# Patient Record
Sex: MALE | Race: WHITE | NOT HISPANIC OR LATINO | Employment: OTHER | URBAN - METROPOLITAN AREA
[De-identification: names, ages, dates, MRNs, and addresses within clinical notes are randomized per-mention and may not be internally consistent; named-entity substitution may affect disease eponyms.]

---

## 2023-03-31 LAB
ANION GAP IN SER/PLAS: 10 MMOL/L (ref 10–20)
CALCIUM (MG/DL) IN SER/PLAS: 9.5 MG/DL (ref 8.6–10.3)
CARBON DIOXIDE, TOTAL (MMOL/L) IN SER/PLAS: 29 MMOL/L (ref 21–32)
CHLORIDE (MMOL/L) IN SER/PLAS: 104 MMOL/L (ref 98–107)
CHOLESTEROL (MG/DL) IN SER/PLAS: 117 MG/DL (ref 0–199)
CHOLESTEROL IN HDL (MG/DL) IN SER/PLAS: 76.2 MG/DL
CHOLESTEROL/HDL RATIO: 1.5
CREATININE (MG/DL) IN SER/PLAS: 1.11 MG/DL (ref 0.5–1.3)
GFR MALE: 71 ML/MIN/1.73M2
GLUCOSE (MG/DL) IN SER/PLAS: 98 MG/DL (ref 74–99)
LDL: 27 MG/DL (ref 0–99)
POTASSIUM (MMOL/L) IN SER/PLAS: 4.3 MMOL/L (ref 3.5–5.3)
SODIUM (MMOL/L) IN SER/PLAS: 139 MMOL/L (ref 136–145)
TRIGLYCERIDE (MG/DL) IN SER/PLAS: 69 MG/DL (ref 0–149)
UREA NITROGEN (MG/DL) IN SER/PLAS: 14 MG/DL (ref 6–23)
VLDL: 14 MG/DL (ref 0–40)

## 2023-11-08 ENCOUNTER — LAB (OUTPATIENT)
Dept: LAB | Facility: LAB | Age: 73
End: 2023-11-08
Payer: MEDICARE

## 2023-11-08 DIAGNOSIS — E29.1 TESTICULAR HYPOFUNCTION: ICD-10-CM

## 2023-11-08 DIAGNOSIS — N40.1 BENIGN PROSTATIC HYPERPLASIA WITH LOWER URINARY TRACT SYMPTOMS: Primary | ICD-10-CM

## 2023-11-08 LAB
PSA SERPL-MCNC: 2.23 NG/ML
TESTOST SERPL-MCNC: 328 NG/DL (ref 240–1000)

## 2023-11-08 PROCEDURE — 36415 COLL VENOUS BLD VENIPUNCTURE: CPT

## 2023-11-08 PROCEDURE — 84153 ASSAY OF PSA TOTAL: CPT

## 2023-11-08 PROCEDURE — 84403 ASSAY OF TOTAL TESTOSTERONE: CPT

## 2023-12-21 ENCOUNTER — APPOINTMENT (OUTPATIENT)
Dept: CARDIOLOGY | Facility: CLINIC | Age: 73
End: 2023-12-21
Payer: MEDICARE

## 2024-01-03 PROBLEM — M79.10 MYALGIA: Status: ACTIVE | Noted: 2024-01-03

## 2024-01-03 PROBLEM — M79.645 PAIN OF LEFT THUMB: Status: ACTIVE | Noted: 2019-10-03

## 2024-01-03 PROBLEM — I49.3 PVC'S (PREMATURE VENTRICULAR CONTRACTIONS): Status: ACTIVE | Noted: 2024-01-03

## 2024-01-03 PROBLEM — M17.12 PRIMARY OSTEOARTHRITIS OF LEFT KNEE: Status: ACTIVE | Noted: 2022-11-10

## 2024-01-03 PROBLEM — N40.0 BENIGN PROSTATIC HYPERPLASIA: Status: ACTIVE | Noted: 2018-03-17

## 2024-01-03 PROBLEM — I47.29 NSVT (NONSUSTAINED VENTRICULAR TACHYCARDIA) (MULTI): Status: ACTIVE | Noted: 2024-01-03

## 2024-01-03 PROBLEM — M54.9 BACKACHE: Status: ACTIVE | Noted: 2024-01-03

## 2024-01-03 PROBLEM — R42 EPISODIC LIGHTHEADEDNESS: Status: ACTIVE | Noted: 2024-01-03

## 2024-01-03 PROBLEM — I25.10 CORONARY ARTERY DISEASE: Status: ACTIVE | Noted: 2024-01-03

## 2024-01-03 PROBLEM — Z20.822 SUSPECTED COVID-19 VIRUS INFECTION: Status: ACTIVE | Noted: 2024-01-03

## 2024-01-03 PROBLEM — E78.5 HYPERLIPIDEMIA: Status: ACTIVE | Noted: 2024-01-03

## 2024-01-03 PROBLEM — I45.9 HEART BLOCK: Status: ACTIVE | Noted: 2024-01-03

## 2024-01-03 PROBLEM — R55 NEAR SYNCOPE: Status: ACTIVE | Noted: 2024-01-03

## 2024-01-03 PROBLEM — F33.2 SEVERE EPISODE OF RECURRENT MAJOR DEPRESSIVE DISORDER, WITHOUT PSYCHOTIC FEATURES (MULTI): Status: ACTIVE | Noted: 2018-03-17

## 2024-01-03 PROBLEM — I10 HYPERTENSION, BENIGN: Status: ACTIVE | Noted: 2024-01-03

## 2024-01-03 PROBLEM — M18.12 OSTEOARTHRITIS OF CARPOMETACARPAL (CMC) JOINT OF LEFT THUMB: Status: ACTIVE | Noted: 2019-10-03

## 2024-01-03 PROBLEM — M25.649 THUMB JOINT STIFFNESS: Status: ACTIVE | Noted: 2019-10-03

## 2024-01-03 PROBLEM — K21.9 GASTROESOPHAGEAL REFLUX DISEASE WITHOUT ESOPHAGITIS: Status: ACTIVE | Noted: 2018-03-17

## 2024-01-03 PROBLEM — R00.1 BRADYCARDIA: Status: ACTIVE | Noted: 2024-01-03

## 2024-01-03 PROBLEM — M25.562 CHRONIC PAIN OF LEFT KNEE: Status: ACTIVE | Noted: 2022-11-10

## 2024-01-03 PROBLEM — I50.32 CHRONIC DIASTOLIC CONGESTIVE HEART FAILURE (MULTI): Status: ACTIVE | Noted: 2024-01-03

## 2024-01-03 PROBLEM — Z95.5 HISTORY OF CORONARY ARTERY STENT PLACEMENT: Status: ACTIVE | Noted: 2024-01-03

## 2024-01-03 PROBLEM — R94.39 ABNORMAL STRESS TEST: Status: ACTIVE | Noted: 2024-01-03

## 2024-01-03 PROBLEM — R53.83 FATIGUE: Status: ACTIVE | Noted: 2024-01-03

## 2024-01-03 PROBLEM — G89.29 CHRONIC PAIN OF LEFT KNEE: Status: ACTIVE | Noted: 2022-11-10

## 2024-01-03 PROBLEM — R01.1 HEART MURMUR: Status: ACTIVE | Noted: 2024-01-03

## 2024-01-03 PROBLEM — F32.A DEPRESSION: Status: ACTIVE | Noted: 2024-01-03

## 2024-01-03 PROBLEM — L05.01 PILONIDAL CYST WITH ABSCESS: Status: ACTIVE | Noted: 2024-01-03

## 2024-01-03 PROBLEM — I21.9 MYOCARDIAL INFARCT (MULTI): Status: ACTIVE | Noted: 2024-01-03

## 2024-01-03 PROBLEM — H91.90 HEARING DECREASED: Status: ACTIVE | Noted: 2024-01-03

## 2024-01-08 ENCOUNTER — APPOINTMENT (OUTPATIENT)
Dept: CARDIOLOGY | Facility: CLINIC | Age: 74
End: 2024-01-08
Payer: MEDICARE

## 2024-01-15 ENCOUNTER — OFFICE VISIT (OUTPATIENT)
Dept: CARDIOLOGY | Facility: CLINIC | Age: 74
End: 2024-01-15
Payer: MEDICARE

## 2024-01-15 VITALS
HEIGHT: 67 IN | SYSTOLIC BLOOD PRESSURE: 124 MMHG | DIASTOLIC BLOOD PRESSURE: 84 MMHG | WEIGHT: 209 LBS | BODY MASS INDEX: 32.8 KG/M2 | HEART RATE: 52 BPM | OXYGEN SATURATION: 95 %

## 2024-01-15 DIAGNOSIS — I47.29 NSVT (NONSUSTAINED VENTRICULAR TACHYCARDIA) (MULTI): ICD-10-CM

## 2024-01-15 DIAGNOSIS — I25.10 CORONARY ARTERY DISEASE INVOLVING NATIVE HEART, UNSPECIFIED VESSEL OR LESION TYPE, UNSPECIFIED WHETHER ANGINA PRESENT: Primary | ICD-10-CM

## 2024-01-15 DIAGNOSIS — G47.33 OBSTRUCTIVE SLEEP APNEA SYNDROME: ICD-10-CM

## 2024-01-15 DIAGNOSIS — I25.10 CORONARY ARTERY DISEASE INVOLVING NATIVE HEART, UNSPECIFIED VESSEL OR LESION TYPE, UNSPECIFIED WHETHER ANGINA PRESENT: ICD-10-CM

## 2024-01-15 DIAGNOSIS — E78.00 PURE HYPERCHOLESTEROLEMIA: ICD-10-CM

## 2024-01-15 DIAGNOSIS — I50.32 CHRONIC HEART FAILURE WITH PRESERVED EJECTION FRACTION (MULTI): ICD-10-CM

## 2024-01-15 DIAGNOSIS — Z95.5 HISTORY OF CORONARY ARTERY STENT PLACEMENT: ICD-10-CM

## 2024-01-15 DIAGNOSIS — I21.11 MYOCARDIAL INFARCTION INVOLVING RIGHT CORONARY ARTERY, UNSPECIFIED MI TYPE (MULTI): ICD-10-CM

## 2024-01-15 DIAGNOSIS — I10 HYPERTENSION, BENIGN: ICD-10-CM

## 2024-01-15 DIAGNOSIS — Z98.84 S/P GASTRIC BYPASS: ICD-10-CM

## 2024-01-15 PROCEDURE — 99214 OFFICE O/P EST MOD 30 MIN: CPT | Performed by: INTERNAL MEDICINE

## 2024-01-15 PROCEDURE — 3074F SYST BP LT 130 MM HG: CPT | Performed by: INTERNAL MEDICINE

## 2024-01-15 PROCEDURE — 1159F MED LIST DOCD IN RCRD: CPT | Performed by: INTERNAL MEDICINE

## 2024-01-15 PROCEDURE — 93000 ELECTROCARDIOGRAM COMPLETE: CPT | Performed by: INTERNAL MEDICINE

## 2024-01-15 PROCEDURE — 1036F TOBACCO NON-USER: CPT | Performed by: INTERNAL MEDICINE

## 2024-01-15 PROCEDURE — 3078F DIAST BP <80 MM HG: CPT | Performed by: INTERNAL MEDICINE

## 2024-01-15 PROCEDURE — 1126F AMNT PAIN NOTED NONE PRSNT: CPT | Performed by: INTERNAL MEDICINE

## 2024-01-15 RX ORDER — PANTOPRAZOLE SODIUM 40 MG/1
40 FOR SUSPENSION ORAL
COMMUNITY

## 2024-01-15 RX ORDER — ASPIRIN 81 MG/1
81 TABLET ORAL ONCE
Status: ON HOLD | COMMUNITY
Start: 2009-05-28 | End: 2024-04-16

## 2024-01-15 RX ORDER — TAMSULOSIN HYDROCHLORIDE 0.4 MG/1
0.4 CAPSULE ORAL DAILY
COMMUNITY
Start: 2014-05-20

## 2024-01-15 RX ORDER — FLUOXETINE HCL 20 MG
20 CAPSULE ORAL DAILY
COMMUNITY

## 2024-01-15 RX ORDER — PNV NO.95/FERROUS FUM/FOLIC AC 28MG-0.8MG
100 TABLET ORAL DAILY
COMMUNITY

## 2024-01-15 RX ORDER — BROMOCRIPTINE MESYLATE 5 MG/1
5 CAPSULE ORAL DAILY
COMMUNITY
Start: 2009-05-28

## 2024-01-15 RX ORDER — ATORVASTATIN CALCIUM 20 MG/1
20 TABLET, FILM COATED ORAL DAILY
COMMUNITY
End: 2024-01-15 | Stop reason: SDUPTHER

## 2024-01-15 RX ORDER — RANOLAZINE 500 MG/1
1 TABLET, EXTENDED RELEASE ORAL EVERY 12 HOURS
COMMUNITY
Start: 2020-11-19 | End: 2024-01-15 | Stop reason: SDUPTHER

## 2024-01-15 RX ORDER — MIRTAZAPINE 15 MG/1
15 TABLET, FILM COATED ORAL NIGHTLY
COMMUNITY
Start: 2018-03-19

## 2024-01-15 RX ORDER — CALCIUM ACETATE 667 MG/1
1 TABLET ORAL DAILY
COMMUNITY

## 2024-01-15 RX ORDER — MULTIVIT-MIN/IRON FUM/FOLIC AC 7.5 MG-4
1 TABLET ORAL DAILY
COMMUNITY
Start: 2015-09-16

## 2024-01-15 ASSESSMENT — PAIN SCALES - GENERAL: PAINLEVEL: 0-NO PAIN

## 2024-01-15 NOTE — PROGRESS NOTES
"  Subjective  Misael Lopez  is a 73 y.o. year old male who presents for ASHD F/U.  No chest pain, no dyspnea, no palpitgations, no edema.    Blood pressure 124/84, pulse 52, height 1.702 m (5' 7\"), weight 94.8 kg (209 lb), SpO2 95 %.   Nitroglycerin  Past Medical History:   Diagnosis Date    Other specified health status     No pertinent past medical history     Past Surgical History:   Procedure Laterality Date    BREAST LUMPECTOMY  09/17/2015    Breast Surgery Lumpectomy    BUNIONECTOMY  09/17/2015    Simple Bunion Exostectomy (Silver Procedure)    CARPAL TUNNEL RELEASE  09/17/2015    Neuroplasty Decompression Median Nerve At Carpal Tunnel    GASTRIC BYPASS  03/31/2016    Gastric Surgery For Morbid Obesity Gastric Bypass    LITHOTRIPSY  09/17/2015    Renal Lithotripsy    NASAL SEPTUM SURGERY  09/17/2015    Nasal Septal Deviation Repair    OTHER SURGICAL HISTORY  09/16/2015    Hemilaminectomy    OTHER SURGICAL HISTORY  11/16/2021    Cardiac catheterization    OTHER SURGICAL HISTORY  11/16/2021    Cardiac catheterization with stent placement    SHOULDER SURGERY  09/17/2015    Shoulder Surgery    VASECTOMY  09/16/2015    Surgery Vas Deferens Vasectomy     No family history on file.  @SOC    Current Outpatient Medications   Medication Sig Dispense Refill    aspirin 81 mg EC tablet Take 1 tablet (81 mg) by mouth 1 time.      bromocriptine (Parlodel) 5 mg capsule Take 1 capsule (5 mg) by mouth once daily.      mirtazapine (Remeron) 15 mg tablet Take 1 tablet (15 mg) by mouth once daily at bedtime.      multivitamin with minerals (multivit-min-iron fum-folic ac) tablet Take 1 tablet by mouth once daily.      ranolazine (Ranexa) 500 mg 12 hr tablet Take 1 tablet (500 mg) by mouth every 12 hours.      tamsulosin (Flomax) 0.4 mg 24 hr capsule Take 1 capsule (0.4 mg) by mouth once daily.      atorvastatin (Lipitor) 20 mg tablet Take 1 tablet (20 mg) by mouth once daily.      calcium acetate (Phoslo) 667 mg tablet Take 1 " tablet (667 mg) by mouth once daily.      calcium polycarbophiL (FiberCon) 625 mg tablet Take 1 tablet (625 mg) by mouth once daily.      cyanocobalamin (Vitamin B-12) 100 mcg tablet Take 1 tablet (100 mcg) by mouth once daily.      pantoprazole (Protonix) 40 mg packet Take 1 packet (40 mg) by mouth once daily in the morning. Take before meals.      PROzac 20 mg capsule Take 1 capsule (20 mg) by mouth once daily.       No current facility-administered medications for this visit.        ROS  Review of Systems   All other systems reviewed and are negative.      Physical Exam  Physical Exam  Constitutional:       Appearance: He is obese.   Eyes:      Extraocular Movements: Extraocular movements intact.      Pupils: Pupils are equal, round, and reactive to light.   Cardiovascular:      Rate and Rhythm: Normal rate and regular rhythm.      Heart sounds: S1 normal and S2 normal.   Pulmonary:      Effort: Pulmonary effort is normal.      Breath sounds: Normal breath sounds.   Abdominal:      Palpations: Abdomen is soft.   Musculoskeletal:      Right lower leg: No edema.      Left lower leg: No edema.   Skin:     General: Skin is warm and dry.   Neurological:      Mental Status: He is alert.          EKG  No results found for this or any previous visit (from the past 4464 hour(s)).    Problem List Items Addressed This Visit       Chronic heart failure with preserved ejection fraction (CMS/HCC)     2/28/22 Lexiscan LVEF = 50%         Relevant Medications    ranolazine (Ranexa) 500 mg 12 hr tablet    Coronary artery disease - Primary    Relevant Medications    ranolazine (Ranexa) 500 mg 12 hr tablet    Other Relevant Orders    ECG 12 lead (Clinic Performed)    Follow Up In Cardiology    History of coronary artery stent placement     3/16/20 PCI/RAKEL of right PDA [facility] ([place]),  11/2/20 cardiac catheterization  70% prox 2nd diag medical reatment.  Panexa 500 twice daily vbegun with improvement in symptoms.  2/28/22  Lexiscan normal ST response with  normal perfusion LVEF = 50%         Hyperlipidemia    Hypertension, benign    Myocardial infarct (CMS/HCC)     IWMI 2009 with acute RAKEL of RCA         Relevant Medications    ranolazine (Ranexa) 500 mg 12 hr tablet    NSVT (nonsustained ventricular tachycardia) (CMS/HCC)    Relevant Medications    ranolazine (Ranexa) 500 mg 12 hr tablet    Obstructive sleep apnea syndrome    S/P gastric bypass         Return 34 months with EKG      Davonte Valdez MD

## 2024-01-15 NOTE — ASSESSMENT & PLAN NOTE
3/16/20 PCI/RAKEL of right PDA [facility] ([place]),  11/2/20 cardiac catheterization  70% prox 2nd diag medical reatment.  Panexa 500 twice daily vbegun with improvement in symptoms.  2/28/22 Lexiscan normal ST response with  normal perfusion LVEF = 50%

## 2024-01-22 RX ORDER — ATORVASTATIN CALCIUM 20 MG/1
20 TABLET, FILM COATED ORAL DAILY
Qty: 90 TABLET | Refills: 3 | Status: SHIPPED | OUTPATIENT
Start: 2024-01-22 | End: 2024-05-13 | Stop reason: SDUPTHER

## 2024-01-22 RX ORDER — RANOLAZINE 500 MG/1
500 TABLET, EXTENDED RELEASE ORAL EVERY 12 HOURS
Qty: 90 TABLET | Refills: 3 | Status: SHIPPED | OUTPATIENT
Start: 2024-01-22 | End: 2024-05-13 | Stop reason: SDUPTHER

## 2024-01-23 ENCOUNTER — OFFICE VISIT (OUTPATIENT)
Dept: NEUROSURGERY | Facility: CLINIC | Age: 74
End: 2024-01-23
Payer: MEDICARE

## 2024-01-23 VITALS
DIASTOLIC BLOOD PRESSURE: 66 MMHG | BODY MASS INDEX: 32.02 KG/M2 | TEMPERATURE: 96.4 F | WEIGHT: 204 LBS | SYSTOLIC BLOOD PRESSURE: 115 MMHG | HEART RATE: 50 BPM | RESPIRATION RATE: 18 BRPM | HEIGHT: 67 IN

## 2024-01-23 DIAGNOSIS — R26.89 IMBALANCE: Primary | ICD-10-CM

## 2024-01-23 DIAGNOSIS — R20.2 PARESTHESIA OF UPPER EXTREMITY: ICD-10-CM

## 2024-01-23 DIAGNOSIS — M47.12 CERVICAL SPONDYLOSIS WITH MYELOPATHY: ICD-10-CM

## 2024-01-23 DIAGNOSIS — Z98.890 HISTORY OF EXCISION OF LAMINA OF LUMBAR VERTEBRA FOR DECOMPRESSION OF SPINAL CORD: ICD-10-CM

## 2024-01-23 PROCEDURE — 3074F SYST BP LT 130 MM HG: CPT | Performed by: NURSE PRACTITIONER

## 2024-01-23 PROCEDURE — 1036F TOBACCO NON-USER: CPT | Performed by: NURSE PRACTITIONER

## 2024-01-23 PROCEDURE — 99204 OFFICE O/P NEW MOD 45 MIN: CPT | Performed by: NURSE PRACTITIONER

## 2024-01-23 PROCEDURE — 1125F AMNT PAIN NOTED PAIN PRSNT: CPT | Performed by: NURSE PRACTITIONER

## 2024-01-23 PROCEDURE — 99214 OFFICE O/P EST MOD 30 MIN: CPT | Performed by: NURSE PRACTITIONER

## 2024-01-23 PROCEDURE — 1159F MED LIST DOCD IN RCRD: CPT | Performed by: NURSE PRACTITIONER

## 2024-01-23 PROCEDURE — 3078F DIAST BP <80 MM HG: CPT | Performed by: NURSE PRACTITIONER

## 2024-01-23 SDOH — ECONOMIC STABILITY: FOOD INSECURITY: WITHIN THE PAST 12 MONTHS, THE FOOD YOU BOUGHT JUST DIDN'T LAST AND YOU DIDN'T HAVE MONEY TO GET MORE.: NEVER TRUE

## 2024-01-23 SDOH — ECONOMIC STABILITY: FOOD INSECURITY: WITHIN THE PAST 12 MONTHS, YOU WORRIED THAT YOUR FOOD WOULD RUN OUT BEFORE YOU GOT MONEY TO BUY MORE.: NEVER TRUE

## 2024-01-23 ASSESSMENT — ENCOUNTER SYMPTOMS
LOSS OF SENSATION IN FEET: 0
DEPRESSION: 0
OCCASIONAL FEELINGS OF UNSTEADINESS: 1

## 2024-01-23 ASSESSMENT — COLUMBIA-SUICIDE SEVERITY RATING SCALE - C-SSRS
6. HAVE YOU EVER DONE ANYTHING, STARTED TO DO ANYTHING, OR PREPARED TO DO ANYTHING TO END YOUR LIFE?: NO
1. IN THE PAST MONTH, HAVE YOU WISHED YOU WERE DEAD OR WISHED YOU COULD GO TO SLEEP AND NOT WAKE UP?: NO
2. HAVE YOU ACTUALLY HAD ANY THOUGHTS OF KILLING YOURSELF?: NO

## 2024-01-23 ASSESSMENT — PATIENT HEALTH QUESTIONNAIRE - PHQ9
1. LITTLE INTEREST OR PLEASURE IN DOING THINGS: NOT AT ALL
2. FEELING DOWN, DEPRESSED OR HOPELESS: NOT AT ALL
SUM OF ALL RESPONSES TO PHQ9 QUESTIONS 1 & 2: 0

## 2024-01-23 ASSESSMENT — LIFESTYLE VARIABLES
HOW OFTEN DO YOU HAVE A DRINK CONTAINING ALCOHOL: MONTHLY OR LESS
HOW OFTEN DO YOU HAVE SIX OR MORE DRINKS ON ONE OCCASION: NEVER
SKIP TO QUESTIONS 9-10: 1
HOW MANY STANDARD DRINKS CONTAINING ALCOHOL DO YOU HAVE ON A TYPICAL DAY: 1 OR 2
AUDIT-C TOTAL SCORE: 1

## 2024-01-23 ASSESSMENT — PAIN SCALES - GENERAL: PAINLEVEL: 6

## 2024-01-23 NOTE — PROGRESS NOTES
"It was a pleasure to see Misael Lopez on 1/23/2024. He is a 73 y.o. year old, right-handed male who presents, with his wife Beronica,, to the [facility] for evaluation of recurrent back pain after remote lumbar surgery. Patient is referred by No ref. provider found. PMH is significant for HTN / HPL, heart block, CHF, NSVT, h/o MI, CAD (s/p stent), prior gastric bypass, GERD, renal stones, depression, JOES, BPH. He is a retired ; lives at home with wife.     He had lumbar laminectomy surgery in 01/2009, then picked up heavy item in 05/2009. Repeat MRI done, with conservative management thereafter.    Misael Lopez has had symptoms of back pain (points to right lumbosacral region, into posterior right thigh) since 08/2023, without inciting event. Symptoms are worse with sitting, walking; better with laying flat, standing, forward flexion of spine. Progression of symptoms prompted today's visit.  Thus far, patient has tried activity adjustment with little to no improvement of symptoms. He denies change in bowel / bladder function, saddle anesthesia.     He also notes STUMBLING SINCE MAY 2023., DIFFICULTY DRESSING, HAND WEAKNESS    PREVIOUS TREATMENTS  Activity adjustment  NSAIDs - contraindicated (s/p gastric bypass)  Topical    Previous Spine Surgery:  L3 - 5 hemilami in 2009 by Dr. Tea Leigh  at [facility]      Smoker: No   Anticoagulation / Antiplatelets: YES: Daily ASA     ROS: 12 / 12 systems reviewed and are negative unless noted in HPI    /66   Pulse 50   Temp 35.8 °C (96.4 °F)   Resp 18   Ht 1.702 m (5' 7\")   Wt 92.5 kg (204 lb)   BMI 31.95 kg/m²     ON EXAM:  General: Well developed, awake/alert/oriented x 3, no distress, alert and cooperative  Skin: Warm and dry, no visible lesions / rashes  ENMT: Mucous membranes moist, no apparent injury  Head/Neck: No apparent injury, LIMITATION WITH LATERAL FLEXION / ROTATION, EXTENSION  Respiratory/Thorax: Normal " breathing with good chest expansion, thorax symmetric  Cardiovascular: No pitting edema, no JVD  Gastrointestinal: Non-distended  NEUROLOGICAL EXAM:  EOMI, face symmetric  Motor Strength: 5/5 BUE, BLE  Muscle Tone: Normal without spasticity or contractures in all extremities  Muscle Bulk: Normal and symmetric in all extremities  Posture:  -- Cervical: Normal  -- Thoracic: Normal  -- Lumbar : Normal  Paraspinal muscle spasm/tenderness : Mild RIGHT SI Joint tenderness.  No palpable tenderness along the spinous processes.  Sensation: DECREASED SENSORY IN RIGHT L5, S1 DERMATOMES, RIGHT C5 DERMATOME; otherwise, SILT in BUE, BLE  Gait: Normal, Toe / Heel Gait are intact, TANDEM GAIT UNSTEADY  Deep Tendon Reflexes: 2+ BUE, 1+ BLE,  +MEGAN'S SIGN on RIGHT    Misael Lopez has imbalance, difficulty dressing, and + Megan's sign on exam today. Concern for CERVICAL MYELOPATHY WITH MEGAN's.  IMBALANCE, DROPPING ITEMS. We discussed rationale for dynamic cervical and lumbar imaging and for MRI C Spine to evaluate for cervical cord compression to explain myelopathic symptoms, exam findings as above. He agrees to follow up after completion of MRI to review results for treatment direction (surgical vs non-surgical). He wishes to consider Dr. Davonte Mauricio should surgical intervention be indicated.  Alysia Fuentes, APRN-CNP

## 2024-01-25 ENCOUNTER — HOSPITAL ENCOUNTER (OUTPATIENT)
Dept: RADIOLOGY | Facility: HOSPITAL | Age: 74
Discharge: HOME | End: 2024-01-25
Payer: MEDICARE

## 2024-01-25 DIAGNOSIS — Z98.890 HISTORY OF EXCISION OF LAMINA OF LUMBAR VERTEBRA FOR DECOMPRESSION OF SPINAL CORD: ICD-10-CM

## 2024-01-25 DIAGNOSIS — M47.12 CERVICAL SPONDYLOSIS WITH MYELOPATHY: ICD-10-CM

## 2024-01-25 DIAGNOSIS — R26.89 IMBALANCE: ICD-10-CM

## 2024-01-25 DIAGNOSIS — R20.2 PARESTHESIA OF UPPER EXTREMITY: ICD-10-CM

## 2024-01-25 PROCEDURE — 72052 X-RAY EXAM NECK SPINE 6/>VWS: CPT

## 2024-01-25 PROCEDURE — 72052 X-RAY EXAM NECK SPINE 6/>VWS: CPT | Performed by: RADIOLOGY

## 2024-01-25 PROCEDURE — 72114 X-RAY EXAM L-S SPINE BENDING: CPT

## 2024-01-25 PROCEDURE — 72114 X-RAY EXAM L-S SPINE BENDING: CPT | Performed by: RADIOLOGY

## 2024-02-06 ENCOUNTER — HOSPITAL ENCOUNTER (OUTPATIENT)
Dept: RADIOLOGY | Facility: CLINIC | Age: 74
Discharge: HOME | End: 2024-02-06
Payer: MEDICARE

## 2024-02-06 DIAGNOSIS — M47.12 CERVICAL SPONDYLOSIS WITH MYELOPATHY: ICD-10-CM

## 2024-02-06 DIAGNOSIS — R26.89 IMBALANCE: ICD-10-CM

## 2024-02-06 DIAGNOSIS — R20.2 PARESTHESIA OF UPPER EXTREMITY: ICD-10-CM

## 2024-02-06 PROCEDURE — 72141 MRI NECK SPINE W/O DYE: CPT | Performed by: RADIOLOGY

## 2024-02-06 PROCEDURE — 72141 MRI NECK SPINE W/O DYE: CPT

## 2024-02-09 ENCOUNTER — OFFICE VISIT (OUTPATIENT)
Dept: NEUROSURGERY | Facility: CLINIC | Age: 74
End: 2024-02-09
Payer: MEDICARE

## 2024-02-09 VITALS
TEMPERATURE: 97.5 F | BODY MASS INDEX: 32.18 KG/M2 | SYSTOLIC BLOOD PRESSURE: 130 MMHG | WEIGHT: 205 LBS | HEIGHT: 67 IN | DIASTOLIC BLOOD PRESSURE: 80 MMHG | HEART RATE: 63 BPM

## 2024-02-09 DIAGNOSIS — M47.12 CERVICAL SPONDYLOSIS WITH MYELOPATHY: ICD-10-CM

## 2024-02-09 DIAGNOSIS — R26.89 IMBALANCE: Primary | ICD-10-CM

## 2024-02-09 PROCEDURE — 3079F DIAST BP 80-89 MM HG: CPT | Performed by: NURSE PRACTITIONER

## 2024-02-09 PROCEDURE — 1125F AMNT PAIN NOTED PAIN PRSNT: CPT | Performed by: NURSE PRACTITIONER

## 2024-02-09 PROCEDURE — 99213 OFFICE O/P EST LOW 20 MIN: CPT | Performed by: NURSE PRACTITIONER

## 2024-02-09 PROCEDURE — 3075F SYST BP GE 130 - 139MM HG: CPT | Performed by: NURSE PRACTITIONER

## 2024-02-09 PROCEDURE — 1160F RVW MEDS BY RX/DR IN RCRD: CPT | Performed by: NURSE PRACTITIONER

## 2024-02-09 PROCEDURE — 1036F TOBACCO NON-USER: CPT | Performed by: NURSE PRACTITIONER

## 2024-02-09 PROCEDURE — 1159F MED LIST DOCD IN RCRD: CPT | Performed by: NURSE PRACTITIONER

## 2024-02-09 ASSESSMENT — PAIN SCALES - GENERAL: PAINLEVEL: 9

## 2024-02-09 ASSESSMENT — PATIENT HEALTH QUESTIONNAIRE - PHQ9
2. FEELING DOWN, DEPRESSED OR HOPELESS: NOT AT ALL
SUM OF ALL RESPONSES TO PHQ9 QUESTIONS 1 & 2: 0
1. LITTLE INTEREST OR PLEASURE IN DOING THINGS: NOT AT ALL

## 2024-02-09 NOTE — PROGRESS NOTES
"Misael Lopez is here today in follow up for cervical myelopathy and to review images. To review, he was initially evaluated on 01/23/2024. He reported lumbosacral pain since 08/2023. He noted stumbling, difficulty dressing, hand weakness since 05/2023. On exam, he had significant cervical spine ROM, sensory deficit in right L5, S1 and C5 dermatomes, and right Megan's sign present. Orders were written for MRI C Spine given symptoms and exam findings of cervical myelopathy..    Today, he reports no change in symptoms. He had imaging completed and is here to review findings.    MRI C SPINE on 02/06/2024:  IMPRESSION:  Severe spinal canal stenosis at C4-C5 and C5-C6. At C5-C6 there is  focal cord signal abnormality suggestive of myelomalacia although a  component of cord edema is not completely excluded. There is also  advanced neural foraminal narrowing at these levels. Additional  multilevel degenerative change as above.  Signed by: Ashish Davis 2/6/2024    XR C SPINE on 01/26/2024:  IMPRESSION:  Multilevel cervical degenerative changes with multilevel neural  foraminal narrowing.  Signed by: Presley Levine     XR LS SPINE on 01/26/2024:  IMPRESSION:  Fairly advanced lumbar degenerative changes particularly L3-S1.  Signed by: Presley Levine    PREVIOUS TREATMENTS  Activity adjustment  NSAIDs - contraindicated (s/p gastric bypass)  Topical     Previous Spine Surgery:  L3 - 5 hemilami in 2009 by Dr. Tea Leigh  at [facility]       Smoker: No   Anticoagulation / Antiplatelets: YES: Daily ASA     ROS x 10 is, otherwise, negative unless documented in HPI above    /80 (BP Location: Left arm, Patient Position: Sitting, BP Cuff Size: Adult)   Pulse 63   Temp 36.4 °C (97.5 °F) (Temporal)   Ht 1.702 m (5' 7\")   Wt 93 kg (205 lb)   BMI 32.11 kg/m²     On Exam: Appears comfortable  A&O x 4, speech clear / fluent  Respirations even / unlabored  Abdomen without distension  MONTES DE OCA  Gait    We reviewed MRI C Spine images with " pointing out of cervical cord impingement. We discussed follow up with neurosurgeon, Dr. Davonte Mauricio, as scheduled, on 02/13/2024. He agrees to have CT C Spine completed. MRI L Spine requested to evaluate lumbar symptoms / degenerative findings noted on x-ray lumbar spine.   Alysia Fuentes, APRN-CNP

## 2024-02-13 ENCOUNTER — OFFICE VISIT (OUTPATIENT)
Dept: NEUROSURGERY | Facility: CLINIC | Age: 74
End: 2024-02-13
Payer: MEDICARE

## 2024-02-13 VITALS
SYSTOLIC BLOOD PRESSURE: 116 MMHG | RESPIRATION RATE: 20 BRPM | TEMPERATURE: 97 F | HEART RATE: 65 BPM | BODY MASS INDEX: 33.59 KG/M2 | DIASTOLIC BLOOD PRESSURE: 70 MMHG | WEIGHT: 214 LBS | HEIGHT: 67 IN

## 2024-02-13 DIAGNOSIS — M51.37 DEGENERATION OF LUMBAR OR LUMBOSACRAL INTERVERTEBRAL DISC: ICD-10-CM

## 2024-02-13 DIAGNOSIS — M47.27 OSTEOARTHRITIS OF SPINE WITH RADICULOPATHY, LUMBOSACRAL REGION: ICD-10-CM

## 2024-02-13 DIAGNOSIS — M47.12 CERVICAL SPONDYLOSIS WITH MYELOPATHY: Primary | ICD-10-CM

## 2024-02-13 DIAGNOSIS — M47.14 SPONDYLOSIS WITH MYELOPATHY, THORACIC REGION: ICD-10-CM

## 2024-02-13 PROCEDURE — 3074F SYST BP LT 130 MM HG: CPT | Performed by: NEUROLOGICAL SURGERY

## 2024-02-13 PROCEDURE — 99215 OFFICE O/P EST HI 40 MIN: CPT | Performed by: NEUROLOGICAL SURGERY

## 2024-02-13 PROCEDURE — 1125F AMNT PAIN NOTED PAIN PRSNT: CPT | Performed by: NEUROLOGICAL SURGERY

## 2024-02-13 PROCEDURE — 1159F MED LIST DOCD IN RCRD: CPT | Performed by: NEUROLOGICAL SURGERY

## 2024-02-13 PROCEDURE — 3078F DIAST BP <80 MM HG: CPT | Performed by: NEUROLOGICAL SURGERY

## 2024-02-13 PROCEDURE — 1160F RVW MEDS BY RX/DR IN RCRD: CPT | Performed by: NEUROLOGICAL SURGERY

## 2024-02-13 PROCEDURE — 1036F TOBACCO NON-USER: CPT | Performed by: NEUROLOGICAL SURGERY

## 2024-02-13 ASSESSMENT — PATIENT HEALTH QUESTIONNAIRE - PHQ9
1. LITTLE INTEREST OR PLEASURE IN DOING THINGS: NOT AT ALL
2. FEELING DOWN, DEPRESSED OR HOPELESS: NOT AT ALL
SUM OF ALL RESPONSES TO PHQ9 QUESTIONS 1 AND 2: 0

## 2024-02-13 ASSESSMENT — PAIN SCALES - GENERAL: PAINLEVEL: 7

## 2024-02-13 NOTE — PROGRESS NOTES
[facility] [place]  Department of Neurological Surgery  Established Patient Visit    History of Present Illness  Misael Lopez is a 73 y.o. year old male who presents to the spine clinic in follow up with his wife complaining of mostly right buttock and thigh pain but was recently evaluated by my nurse practitioner and found to have numerous signs of myelopathy.  He does admit to having had years of neck pain and some arm symptoms including loss of fine motor touch in his fingertips.  He has had carpal tunnel surgery.  He has had rotator surgery on the left.  But no one ever evaluated his cervical spine until recently.  He does have trouble with his balance and he has lost many fine motor coordination activities with his hands.    Patient's BMI is Body mass index is 33.52 kg/m².    14/14 systems reviewed and negative other than what is listed in the history of present illness    Patient Active Problem List   Diagnosis    Abnormal stress test    Backache    Benign prostatic hyperplasia    Bradycardia    Calculus of kidney    Chronic heart failure with preserved ejection fraction (CMS/HCC)    Chronic pain of left knee    Coronary artery disease    Depression    Episodic lightheadedness    Fatigue    Gastroesophageal reflux disease without esophagitis    Hearing decreased    Heart block    Heart murmur    History of coronary artery stent placement    Hyperlipidemia    Hypertension, benign    Degeneration of lumbar or lumbosacral intervertebral disc    Lumbosacral spondylosis    Obesity, unspecified    Myalgia    Angina pectoris (CMS/HCC)    Myocardial infarct (CMS/HCC)    Near syncope    NSVT (nonsustained ventricular tachycardia) (CMS/HCC)    Obstructive sleep apnea syndrome    Osteoarthritis of carpometacarpal (CMC) joint of left thumb    Pain of left thumb    Pilonidal cyst with abscess    Primary osteoarthritis of left knee    PVC's (premature ventricular contractions)    Severe episode of  recurrent major depressive disorder, without psychotic features (CMS/HCC)    Suspected COVID-19 virus infection    Thumb joint stiffness    S/P gastric bypass     Past Medical History:   Diagnosis Date    Other specified health status     No pertinent past medical history     Past Surgical History:   Procedure Laterality Date    BREAST LUMPECTOMY  09/17/2015    Breast Surgery Lumpectomy    BUNIONECTOMY  09/17/2015    Simple Bunion Exostectomy (Silver Procedure)    CARPAL TUNNEL RELEASE  09/17/2015    Neuroplasty Decompression Median Nerve At Carpal Tunnel    GASTRIC BYPASS  03/31/2016    Gastric Surgery For Morbid Obesity Gastric Bypass    LITHOTRIPSY  09/17/2015    Renal Lithotripsy    NASAL SEPTUM SURGERY  09/17/2015    Nasal Septal Deviation Repair    OTHER SURGICAL HISTORY  09/16/2015    Hemilaminectomy    OTHER SURGICAL HISTORY  11/16/2021    Cardiac catheterization    OTHER SURGICAL HISTORY  11/16/2021    Cardiac catheterization with stent placement    SHOULDER SURGERY  09/17/2015    Shoulder Surgery    VASECTOMY  09/16/2015    Surgery Vas Deferens Vasectomy     Social History     Tobacco Use    Smoking status: Never    Smokeless tobacco: Never   Substance Use Topics    Alcohol use: Yes     Comment: socially     family history is not on file.    Current Outpatient Medications:     aspirin 81 mg EC tablet, Take 1 tablet (81 mg) by mouth 1 time., Disp: , Rfl:     atorvastatin (Lipitor) 20 mg tablet, Take 1 tablet (20 mg) by mouth once daily., Disp: 90 tablet, Rfl: 3    bromocriptine (Parlodel) 5 mg capsule, Take 1 capsule (5 mg) by mouth once daily., Disp: , Rfl:     calcium acetate (Phoslo) 667 mg tablet, Take 1 tablet (667 mg) by mouth once daily., Disp: , Rfl:     calcium polycarbophiL (FiberCon) 625 mg tablet, Take 1 tablet (625 mg) by mouth once daily., Disp: , Rfl:     cyanocobalamin (Vitamin B-12) 100 mcg tablet, Take 1 tablet (100 mcg) by mouth once daily., Disp: , Rfl:     mirtazapine (Remeron) 15 mg  tablet, Take 1 tablet (15 mg) by mouth once daily at bedtime., Disp: , Rfl:     multivitamin with minerals (multivit-min-iron fum-folic ac) tablet, Take 1 tablet by mouth once daily., Disp: , Rfl:     pantoprazole (Protonix) 40 mg packet, Take 1 packet (40 mg) by mouth once daily in the morning. Take before meals., Disp: , Rfl:     PROzac 20 mg capsule, Take 1 capsule (20 mg) by mouth once daily., Disp: , Rfl:     ranolazine (Ranexa) 500 mg 12 hr tablet, Take 1 tablet (500 mg) by mouth every 12 hours., Disp: 90 tablet, Rfl: 3    tamsulosin (Flomax) 0.4 mg 24 hr capsule, Take 1 capsule (0.4 mg) by mouth once daily., Disp: , Rfl:   Allergies   Allergen Reactions    Nitroglycerin Other     Arrhythmia / Heart Block       Physical Examination:    General: NAD, AOx 3,  no aphasia or dysarthria, normal fund of knowledge  Cranial Nerves II-XII: VFF, PERRL, EOMI, Face Symm, Facial SILT, Palate/Tongue midline and symmetric, he does wear hearing aids  Motor: 5/5 Throughout all extremities except in the lower extremity on the right where everything was about 4.5 out of 5,  No drift, no dysmetria on finger to nose  Sensation: SILT and PP throughout all extremities  DTRS: 1+ Throughout, positive Hoffmans, no clonus  His gait was a little wide-based and unsteady and he had difficulty going up on his heels and his toes and he has a sway on checking for his Romberg    Results:  I personally reviewed and interpreted the imaging results which included the MRI of his cervical spine which shows that he has severe cord compression at C4-5, C5-6, and C6-7 and he has a central bulge at C3-4 with some subluxation on the MRI but that reduces with extension on his flexion-extension x-rays.    Assessment and Plan:      Misael Lopez is a 73 y.o. year old male who presents to the spine clinic in follow up with cervical myelopathy and evidence of myelomalacia on his MRI.  I know that he is going to require at least a 3 level anterior cervical  discectomy with interbody fusion and plate fixation and possibly a 4 level operation and if he has OPLL a corpectomy at 1 level or 2 might be warranted.  I gave the patient and his wife a primer on the operation of a 4 level anterior cervical discectomy with interbody fusion and plate fixation.  I went over the operation with him in detail. We discussed risks,(these include but are not limited to - bleeding, infection, paralysis, muscle weakness, CSF leak, bowel or bladder dysfunction, incomplete resolution of pain or numbness, DVT/PE, heart attack, stroke, and other unforeseen medical and anesthesia complications) benefits, and outcome possibilities as well as the alternatives to this form of therapy. He understands and would like to proceed.  Before we schedule him for surgery there are several things that I want to do.  I want a CAT scan of his cervical spine; I want an MRI of his thoracic spine because I can see significant degenerative changes in the upper thoracic spine but want to make certain I protect all areas of his spine.  Since his primary complaints are coming from the lumbar region and has had prior surgery there and also ordering an MRI of his lumbar spine.  In the meantime we will have my office asked his cardiologist for clearance.  We will bring him back in after the studies to have a final discussion of surgical intervention.      I have reviewed all prior documentation and reviewed the electronic medical record since admission. I have personally have reviewed all advanced imaging not just the reports and used my interpretation as documented as the relevant findings. I have reviewed the risks and benefits of all treatment recommendations listed in this note with the patient and family. I spent a total of 50 minutes in service to this patient's care during this date of service.      The above clinical summary has been dictated with voice recognition software. It has not been proofread for  grammatical errors, typographical mistakes, or other semantic inconsistencies.    Thank you for visiting our office today. It was our pleasure to take part in your healthcare.     Do not hesitate to call with any questions regarding your plan of care after leaving. My office can be reached at [phone] M-F 8am-4pm.     To clinicians, thank you very much for this kind referral. It is a privilege to partner with you in the care of your patients. My office would be delighted to assist you with any further consultations or with questions regarding the plan of care outlined. Do not hesitate to call the office or contact me directly.     Sincerely,    Davonte Mauricio MD, FAANS, FACS  Board Certified Neurological Surgeon  , Department of Neurological Surgery  [facility] [employer]    [facility]  [address]  [facility] [address]  [place] [address]    [facility]  [address]  [address]  [place] [address]    Phone: [phone]  Fax: [phone]

## 2024-02-18 ENCOUNTER — HOSPITAL ENCOUNTER (OUTPATIENT)
Dept: RADIOLOGY | Facility: HOSPITAL | Age: 74
Discharge: HOME | End: 2024-02-18
Payer: MEDICARE

## 2024-02-18 DIAGNOSIS — M47.12 CERVICAL SPONDYLOSIS WITH MYELOPATHY: ICD-10-CM

## 2024-02-18 DIAGNOSIS — R26.89 IMBALANCE: ICD-10-CM

## 2024-02-18 PROCEDURE — 72125 CT NECK SPINE W/O DYE: CPT | Performed by: RADIOLOGY

## 2024-02-18 PROCEDURE — 72125 CT NECK SPINE W/O DYE: CPT

## 2024-02-19 ENCOUNTER — HOSPITAL ENCOUNTER (OUTPATIENT)
Dept: RADIOLOGY | Facility: CLINIC | Age: 74
Discharge: HOME | End: 2024-02-19
Payer: MEDICARE

## 2024-02-19 DIAGNOSIS — R26.89 IMBALANCE: ICD-10-CM

## 2024-02-19 PROCEDURE — 72148 MRI LUMBAR SPINE W/O DYE: CPT

## 2024-02-19 PROCEDURE — 72146 MRI CHEST SPINE W/O DYE: CPT | Performed by: RADIOLOGY

## 2024-02-19 PROCEDURE — 72146 MRI CHEST SPINE W/O DYE: CPT

## 2024-02-19 PROCEDURE — 72148 MRI LUMBAR SPINE W/O DYE: CPT | Performed by: RADIOLOGY

## 2024-03-05 ENCOUNTER — OFFICE VISIT (OUTPATIENT)
Dept: NEUROSURGERY | Facility: CLINIC | Age: 74
End: 2024-03-05
Payer: MEDICARE

## 2024-03-05 VITALS
HEART RATE: 82 BPM | DIASTOLIC BLOOD PRESSURE: 72 MMHG | SYSTOLIC BLOOD PRESSURE: 110 MMHG | HEIGHT: 67 IN | WEIGHT: 210.8 LBS | RESPIRATION RATE: 20 BRPM | TEMPERATURE: 97.1 F | BODY MASS INDEX: 33.09 KG/M2

## 2024-03-05 DIAGNOSIS — M48.062 LUMBAR STENOSIS WITH NEUROGENIC CLAUDICATION: ICD-10-CM

## 2024-03-05 DIAGNOSIS — M51.04 HERNIATION OF INTERVERTEBRAL DISC OF THORACIC SPINE WITH MYELOPATHY: ICD-10-CM

## 2024-03-05 DIAGNOSIS — G95.89 MYELOMALACIA OF CERVICAL CORD (MULTI): ICD-10-CM

## 2024-03-05 DIAGNOSIS — M47.12 CERVICAL SPONDYLOSIS WITH MYELOPATHY: Primary | ICD-10-CM

## 2024-03-05 PROCEDURE — 99214 OFFICE O/P EST MOD 30 MIN: CPT | Performed by: NEUROLOGICAL SURGERY

## 2024-03-05 PROCEDURE — 1159F MED LIST DOCD IN RCRD: CPT | Performed by: NEUROLOGICAL SURGERY

## 2024-03-05 PROCEDURE — 1036F TOBACCO NON-USER: CPT | Performed by: NEUROLOGICAL SURGERY

## 2024-03-05 PROCEDURE — 3078F DIAST BP <80 MM HG: CPT | Performed by: NEUROLOGICAL SURGERY

## 2024-03-05 PROCEDURE — 1160F RVW MEDS BY RX/DR IN RCRD: CPT | Performed by: NEUROLOGICAL SURGERY

## 2024-03-05 PROCEDURE — 1125F AMNT PAIN NOTED PAIN PRSNT: CPT | Performed by: NEUROLOGICAL SURGERY

## 2024-03-05 PROCEDURE — 3074F SYST BP LT 130 MM HG: CPT | Performed by: NEUROLOGICAL SURGERY

## 2024-03-05 ASSESSMENT — PAIN SCALES - GENERAL: PAINLEVEL: 5

## 2024-03-05 NOTE — PROGRESS NOTES
[facility] [place]  Department of Neurological Surgery  Established Patient Visit    History of Present Illness  Misael Lopez is a 73 y.o. year old male who presents to the spine clinic in follow up with his wife and the new CAT scan of his cervical spine.  The spurs at his cervical levels are more clearly defined but he does not have ossification of the posterior longitudinal ligament behind the vertebral bodies and so he will not need a corpectomy at any of the levels I am planning to operate on.  During his last visit we had gone over the MRI together.  He is clearly showing signs of myelopathy both from his thoracic abnormality but more importantly from his cervical cord compression and myelomalacia.  He has severe cord compression at 3 of the 4 levels C4-5, C5-6, and C6-7 and he has subluxation of C3 on C4 with severe foraminal stenosis.  Because this is affecting his life at this point and he is not able to do the activities that he enjoys and he feels that he is losing his function.  He is losing his balance, he is stumbling, and he is not able to feel objects and having difficulty with buttoning buttons snapping snaps etc.  I believe that we should proceed immediately with surgical options.  This is not a situation where physical therapy is indicated because of the severe compression and the myelomalacia that is already developed.  I am recommending an anterior cervical disc excision with interbody fusion using allograft and locally harvested autograft with plate and screw fixation at C3-4, C4-5, C5-6, and C6-7.  I went over the operation with him in detail. We discussed risks,(these include but are not limited to - bleeding, infection, paralysis, muscle weakness, CSF leak, bowel or bladder dysfunction, incomplete resolution of pain or numbness, DVT/PE, heart attack, stroke, and other unforeseen medical and anesthesia complications) benefits, and outcome possibilities as well as the  alternatives to this form of therapy. He understands and would like to proceed.    He also understands that we will allow him to heal up from 1 operation and then we will need to proceed with the thoracic and or the lumbar region next, but regardless of which order we do them in he is going to need them done before he gets good relief of his pain and an ability to walk for any distance back.    Patient's BMI is Body mass index is 33.02 kg/m².    14/14 systems reviewed and negative other than what is listed in the history of present illness    Patient Active Problem List   Diagnosis    Abnormal stress test    Backache    Benign prostatic hyperplasia    Bradycardia    Calculus of kidney    Chronic heart failure with preserved ejection fraction (CMS/HCC)    Chronic pain of left knee    Coronary artery disease    Depression    Episodic lightheadedness    Fatigue    Gastroesophageal reflux disease without esophagitis    Hearing decreased    Heart block    Heart murmur    History of coronary artery stent placement    Hyperlipidemia    Hypertension, benign    Degeneration of lumbar or lumbosacral intervertebral disc    Lumbosacral spondylosis    Obesity, unspecified    Myalgia    Angina pectoris (CMS/HCC)    Myocardial infarct (CMS/HCC)    Near syncope    NSVT (nonsustained ventricular tachycardia) (CMS/HCC)    Obstructive sleep apnea syndrome    Osteoarthritis of carpometacarpal (CMC) joint of left thumb    Pain of left thumb    Pilonidal cyst with abscess    Primary osteoarthritis of left knee    PVC's (premature ventricular contractions)    Severe episode of recurrent major depressive disorder, without psychotic features (CMS/HCC)    Suspected COVID-19 virus infection    Thumb joint stiffness    S/P gastric bypass    Myelomalacia of cervical cord (CMS/HCC)    Cervical spondylosis with myelopathy    Lumbar stenosis with neurogenic claudication    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      Past Medical History:   Diagnosis Date    Other specified health status     No pertinent past medical history     Past Surgical History:   Procedure Laterality Date    BREAST LUMPECTOMY  09/17/2015    Breast Surgery Lumpectomy    BUNIONECTOMY  09/17/2015    Simple Bunion Exostectomy (Silver Procedure)    CARPAL TUNNEL RELEASE  09/17/2015    Neuroplasty Decompression Median Nerve At Carpal Tunnel    GASTRIC BYPASS  03/31/2016    Gastric Surgery For Morbid Obesity Gastric Bypass    LITHOTRIPSY  09/17/2015    Renal Lithotripsy    NASAL SEPTUM SURGERY  09/17/2015    Nasal Septal Deviation Repair    OTHER SURGICAL HISTORY  09/16/2015    Hemilaminectomy    OTHER SURGICAL HISTORY  11/16/2021    Cardiac catheterization    OTHER SURGICAL HISTORY  11/16/2021    Cardiac catheterization with stent placement    SHOULDER SURGERY  09/17/2015    Shoulder Surgery    VASECTOMY  09/16/2015    Surgery Vas Deferens Vasectomy     Social History     Tobacco Use    Smoking status: Never    Smokeless tobacco: Never   Substance Use Topics    Alcohol use: Yes     Comment: socially     family history is not on file.    Current Outpatient Medications:     aspirin 81 mg EC tablet, Take 1 tablet (81 mg) by mouth 1 time., Disp: , Rfl:     atorvastatin (Lipitor) 20 mg tablet, Take 1 tablet (20 mg) by mouth once daily., Disp: 90 tablet, Rfl: 3    bromocriptine (Parlodel) 5 mg capsule, Take 1 capsule (5 mg) by mouth once daily., Disp: , Rfl:     calcium acetate (Phoslo) 667 mg tablet, Take 1 tablet (667 mg) by mouth once daily., Disp: , Rfl:     calcium polycarbophiL (FiberCon) 625 mg tablet, Take 1 tablet (625 mg) by mouth once daily., Disp: , Rfl:     cyanocobalamin (Vitamin B-12) 100 mcg tablet, Take 1 tablet (100 mcg) by mouth once daily., Disp: , Rfl:     mirtazapine (Remeron) 15 mg tablet, Take 1 tablet (15 mg) by mouth once daily at bedtime., Disp: , Rfl:     multivitamin with minerals (multivit-min-iron fum-folic ac) tablet, Take 1 tablet  by mouth once daily., Disp: , Rfl:     pantoprazole (Protonix) 40 mg packet, Take 1 packet (40 mg) by mouth once daily in the morning. Take before meals., Disp: , Rfl:     PROzac 20 mg capsule, Take 1 capsule (20 mg) by mouth once daily., Disp: , Rfl:     ranolazine (Ranexa) 500 mg 12 hr tablet, Take 1 tablet (500 mg) by mouth every 12 hours., Disp: 90 tablet, Rfl: 3    tamsulosin (Flomax) 0.4 mg 24 hr capsule, Take 1 capsule (0.4 mg) by mouth once daily., Disp: , Rfl:   Allergies   Allergen Reactions    Nitroglycerin Other     Arrhythmia / Heart Block           Results:  I personally reviewed and interpreted the imaging results which included a CAT scan of his cervical spine that is described above.    Assessment and Plan:      Misael Lopez is a 73 y.o. year old male who presents to the spine clinic in follow up with his wife and the new CAT scan of his cervical spine.  The spurs at his cervical levels are more clearly defined but he does not have ossification of the posterior longitudinal ligament behind the vertebral bodies and so he will not need a corpectomy at any of the levels I am planning to operate on.  During his last visit we had gone over the MRI together.  He is clearly showing signs of myelopathy both from his thoracic abnormality but more importantly from his cervical cord compression and myelomalacia.  He has severe cord compression at 3 of the 4 levels C4-5, C5-6, and C6-7 and he has subluxation of C3 on C4 with severe foraminal stenosis.  Because this is affecting his life at this point and he is not able to do the activities that he enjoys and he feels that he is losing his function.  He is losing his balance, he is stumbling, and he is not able to feel objects and having difficulty with buttoning buttons snapping snaps etc.  I believe that we should proceed immediately with surgical options.  This is not a situation where physical therapy is indicated because of the severe compression and the  myelomalacia that is already developed.  I am recommending an anterior cervical disc excision with interbody fusion using allograft and locally harvested autograft with plate and screw fixation at C3-4, C4-5, C5-6, and C6-7.  I went over the operation with him in detail. We discussed risks,(these include but are not limited to - bleeding, infection, paralysis, muscle weakness, CSF leak, bowel or bladder dysfunction, incomplete resolution of pain or numbness, DVT/PE, heart attack, stroke, and other unforeseen medical and anesthesia complications) benefits, and outcome possibilities as well as the alternatives to this form of therapy. He understands and would like to proceed.    He also understands that we will allow him to heal up from 1 operation and then we will need to proceed with the thoracic and or the lumbar region next, but regardless of which order we do them in he is going to need them done before he gets good relief of his pain and an ability to walk for any distance back.    I have reviewed all prior documentation and reviewed the electronic medical record since admission. I have personally have reviewed all advanced imaging not just the reports and used my interpretation as documented as the relevant findings. I have reviewed the risks and benefits of all treatment recommendations listed in this note with the patient and family. I spent a total of 30 minutes in service to this patient's care during this date of service.      The above clinical summary has been dictated with voice recognition software. It has not been proofread for grammatical errors, typographical mistakes, or other semantic inconsistencies.    Thank you for visiting our office today. It was our pleasure to take part in your healthcare.     Do not hesitate to call with any questions regarding your plan of care after leaving. My office can be reached at [phone] M-F 8am-4pm.     To clinicians, thank you very much for this kind  referral. It is a privilege to partner with you in the care of your patients. My office would be delighted to assist you with any further consultations or with questions regarding the plan of care outlined. Do not hesitate to call the office or contact me directly.     Sincerely,    Davonte Mauricio MD, FAANS, FACS  Board Certified Neurological Surgeon  , Department of Neurological Surgery  [facility]    [facility]  [address]  [facility] [address]  [address]  [address]  [address] [place] [address]    Phone: [phone]  Fax: [phone]

## 2024-03-06 DIAGNOSIS — G95.89 MYELOMALACIA OF CERVICAL CORD (MULTI): ICD-10-CM

## 2024-03-06 DIAGNOSIS — M47.12 CERVICAL SPONDYLOSIS WITH MYELOPATHY: Primary | ICD-10-CM

## 2024-03-06 DIAGNOSIS — D68.9 COAGULATION DEFECT, UNSPECIFIED (MULTI): ICD-10-CM

## 2024-03-07 RX ORDER — SODIUM CHLORIDE, SODIUM LACTATE, POTASSIUM CHLORIDE, CALCIUM CHLORIDE 600; 310; 30; 20 MG/100ML; MG/100ML; MG/100ML; MG/100ML
100 INJECTION, SOLUTION INTRAVENOUS CONTINUOUS
Status: CANCELLED | OUTPATIENT
Start: 2024-04-15

## 2024-04-01 ENCOUNTER — PRE-ADMISSION TESTING (OUTPATIENT)
Dept: PREADMISSION TESTING | Facility: HOSPITAL | Age: 74
End: 2024-04-01
Payer: MEDICARE

## 2024-04-01 ENCOUNTER — HOSPITAL ENCOUNTER (OUTPATIENT)
Dept: RADIOLOGY | Facility: HOSPITAL | Age: 74
Discharge: HOME | End: 2024-04-01
Payer: MEDICARE

## 2024-04-01 VITALS
DIASTOLIC BLOOD PRESSURE: 77 MMHG | RESPIRATION RATE: 16 BRPM | HEART RATE: 56 BPM | SYSTOLIC BLOOD PRESSURE: 140 MMHG | TEMPERATURE: 96.1 F | OXYGEN SATURATION: 97 % | HEIGHT: 67 IN | BODY MASS INDEX: 33.56 KG/M2 | WEIGHT: 213.85 LBS

## 2024-04-01 DIAGNOSIS — G95.89 MYELOMALACIA OF CERVICAL CORD (MULTI): ICD-10-CM

## 2024-04-01 DIAGNOSIS — Z01.818 PREOP TESTING: Primary | ICD-10-CM

## 2024-04-01 DIAGNOSIS — D68.9 COAGULATION DEFECT, UNSPECIFIED (MULTI): ICD-10-CM

## 2024-04-01 DIAGNOSIS — M47.12 CERVICAL SPONDYLOSIS WITH MYELOPATHY: ICD-10-CM

## 2024-04-01 LAB
ABO GROUP (TYPE) IN BLOOD: NORMAL
ANION GAP SERPL CALC-SCNC: 12 MMOL/L (ref 10–20)
ANTIBODY SCREEN: NORMAL
APTT PPP: 37 SECONDS (ref 27–38)
BASOPHILS # BLD AUTO: 0.04 X10*3/UL (ref 0–0.1)
BASOPHILS NFR BLD AUTO: 0.7 %
BUN SERPL-MCNC: 16 MG/DL (ref 6–23)
CALCIUM SERPL-MCNC: 8.8 MG/DL (ref 8.6–10.3)
CHLORIDE SERPL-SCNC: 106 MMOL/L (ref 98–107)
CO2 SERPL-SCNC: 26 MMOL/L (ref 21–32)
CREAT SERPL-MCNC: 1 MG/DL (ref 0.5–1.3)
EGFRCR SERPLBLD CKD-EPI 2021: 80 ML/MIN/1.73M*2
EOSINOPHIL # BLD AUTO: 0.28 X10*3/UL (ref 0–0.4)
EOSINOPHIL NFR BLD AUTO: 4.6 %
ERYTHROCYTE [DISTWIDTH] IN BLOOD BY AUTOMATED COUNT: 12.9 % (ref 11.5–14.5)
GLUCOSE SERPL-MCNC: 103 MG/DL (ref 74–99)
HCT VFR BLD AUTO: 39.8 % (ref 41–52)
HGB BLD-MCNC: 13 G/DL (ref 13.5–17.5)
IMM GRANULOCYTES # BLD AUTO: 0.01 X10*3/UL (ref 0–0.5)
IMM GRANULOCYTES NFR BLD AUTO: 0.2 % (ref 0–0.9)
INR PPP: 1.1 (ref 0.9–1.1)
LYMPHOCYTES # BLD AUTO: 1.8 X10*3/UL (ref 0.8–3)
LYMPHOCYTES NFR BLD AUTO: 29.7 %
MCH RBC QN AUTO: 33 PG (ref 26–34)
MCHC RBC AUTO-ENTMCNC: 32.7 G/DL (ref 32–36)
MCV RBC AUTO: 101 FL (ref 80–100)
MONOCYTES # BLD AUTO: 0.56 X10*3/UL (ref 0.05–0.8)
MONOCYTES NFR BLD AUTO: 9.2 %
NEUTROPHILS # BLD AUTO: 3.38 X10*3/UL (ref 1.6–5.5)
NEUTROPHILS NFR BLD AUTO: 55.6 %
NRBC BLD-RTO: 0 /100 WBCS (ref 0–0)
PLATELET # BLD AUTO: 173 X10*3/UL (ref 150–450)
POTASSIUM SERPL-SCNC: 4.5 MMOL/L (ref 3.5–5.3)
PROTHROMBIN TIME: 12.3 SECONDS (ref 9.8–12.8)
RBC # BLD AUTO: 3.94 X10*6/UL (ref 4.5–5.9)
RH FACTOR (ANTIGEN D): NORMAL
SODIUM SERPL-SCNC: 139 MMOL/L (ref 136–145)
WBC # BLD AUTO: 6.1 X10*3/UL (ref 4.4–11.3)

## 2024-04-01 PROCEDURE — 86901 BLOOD TYPING SEROLOGIC RH(D): CPT

## 2024-04-01 PROCEDURE — 99204 OFFICE O/P NEW MOD 45 MIN: CPT | Performed by: NURSE PRACTITIONER

## 2024-04-01 PROCEDURE — 87081 CULTURE SCREEN ONLY: CPT | Mod: PARLAB

## 2024-04-01 PROCEDURE — 80048 BASIC METABOLIC PNL TOTAL CA: CPT

## 2024-04-01 PROCEDURE — 71046 X-RAY EXAM CHEST 2 VIEWS: CPT

## 2024-04-01 PROCEDURE — 85610 PROTHROMBIN TIME: CPT

## 2024-04-01 PROCEDURE — 85025 COMPLETE CBC W/AUTO DIFF WBC: CPT

## 2024-04-01 PROCEDURE — 36415 COLL VENOUS BLD VENIPUNCTURE: CPT

## 2024-04-01 ASSESSMENT — DUKE ACTIVITY SCORE INDEX (DASI)
CAN YOU DO HEAVY WORK AROUND THE HOUSE LIKE SCRUBBING FLOORS OR LIFTING AND MOVING HEAVY FURNITURE: NO
CAN YOU DO LIGHT WORK AROUND THE HOUSE LIKE DUSTING OR WASHING DISHES: YES
CAN YOU PARTICIPATE IN MODERATE RECREATIONAL ACTIVITIES LIKE GOLF, BOWLING, DANCING, DOUBLES TENNIS OR THROWING A BASEBALL OR FOOTBALL: NO
CAN YOU RUN A SHORT DISTANCE: YES
CAN YOU WALK INDOORS, SUCH AS AROUND YOUR HOUSE: YES
CAN YOU PARTICIPATE IN STRENOUS SPORTS LIKE SWIMMING, SINGLES TENNIS, FOOTBALL, BASKETBALL, OR SKIING: NO
CAN YOU TAKE CARE OF YOURSELF (EAT, DRESS, BATHE, OR USE TOILET): YES
CAN YOU DO MODERATE WORK AROUND THE HOUSE LIKE VACUUMING, SWEEPING FLOORS OR CARRYING GROCERIES: YES
DASI METS SCORE: 7.3
CAN YOU HAVE SEXUAL RELATIONS: YES
CAN YOU WALK A BLOCK OR TWO ON LEVEL GROUND: YES
TOTAL_SCORE: 36.7
CAN YOU CLIMB A FLIGHT OF STAIRS OR WALK UP A HILL: YES
CAN YOU DO YARD WORK LIKE RAKING LEAVES, WEEDING OR PUSHING A MOWER: YES

## 2024-04-01 NOTE — PREPROCEDURE INSTRUCTIONS
Medication List            Accurate as of April 1, 2024  1:45 PM. Always use your most recent med list.                aspirin 81 mg EC tablet  Medication Adjustments for Surgery: Stop 7 days before surgery     atorvastatin 20 mg tablet  Commonly known as: Lipitor  Take 1 tablet (20 mg) by mouth once daily.  Medication Adjustments for Surgery: Continue until night before surgery     bromocriptine 5 mg capsule  Commonly known as: Parlodel  Medication Adjustments for Surgery: Other (Comment)     calcium acetate 667 mg tablet  Commonly known as: Phoslo  Medication Adjustments for Surgery: Stop 7 days before surgery     cyanocobalamin 100 mcg tablet  Commonly known as: Vitamin B-12  Medication Adjustments for Surgery: Stop 7 days before surgery     FiberCon 625 mg tablet  Generic drug: calcium polycarbophiL  Medication Adjustments for Surgery: Other (Comment)     mirtazapine 15 mg tablet  Commonly known as: Remeron  Medication Adjustments for Surgery: Continue until night before surgery     multivitamin with minerals tablet  Medication Adjustments for Surgery: Stop 7 days before surgery     Protonix 40 mg packet  Generic drug: pantoprazole  Medication Adjustments for Surgery: Take morning of surgery with sip of water, no other fluids     PROzac 20 mg capsule  Generic drug: FLUoxetine  Medication Adjustments for Surgery: Take morning of surgery with sip of water, no other fluids     ranolazine 500 mg 12 hr tablet  Commonly known as: Ranexa  Take 1 tablet (500 mg) by mouth every 12 hours.  Medication Adjustments for Surgery: Continue until night before surgery     tamsulosin 0.4 mg 24 hr capsule  Commonly known as: Flomax  Medication Adjustments for Surgery: Take morning of surgery with sip of water, no other fluids            PRE-OPERATIVE INSTRUCTIONS FOR SURGERY    *Do not eat anything after midnight the night of surgery.  This includes food of any kind (including hard candy, cough drops, mints).   You may have up  to TEN ounces of clear liquid  until TWO hours prior to your arrival time to the hospital.  This includes water, black tea/coffee, (no milk or cream) apple juice and electrolyte drinks (GATORADE).  You may chew gum until TWO hours prior you your surgery/procedure.         *One of our staff members will call you ONE business day before your surgery, between 11 am-2 pm to let you know the time to arrive.  If you have no received a call by 2 pm, call [phone]  *When you arrive at the hospital-->GO TO Registration on the ground floor  *Stop smoking 24 hours prior to surgery.  No Marijuana, CBD Oil or Vaping for 48 hours  *No alcohol 24 hours prior to surgery  *You will need a responsible adult to drive you home  -No acrylic nails or nail polish on at least one fingernail, NO polish on toes for foot surgery  -You may be asked to remove your dentures, partial plate, eyeglasses or contact lenses before going to surgery.  Please bring a case for these items.  -Body piercings need to be removed.  Jewelry and valuables should be left at home.  -Put on loose,  comfortable, clean clothing, that will accommodate bandages    4/11 pm  4/12 pm  4/13 pm  4/14 pm  4/15 am    Why do I need to take a preoperative antibacterial shower?  Skin is not sterile.  It is best to try to make your skin as free of germs as possible before surgery.  Proper cleansing with a germ killing soap before surgery can lower the number of germs on your skin.  This helps to reduce the risk of infection at the surgical site.  Following the instructions listed below will help you prepare your skin for surgery.      How do I use the solution?    Steps: Begin using your CHG soap 5 days before your surgery on _____4/15/2024_____________.    *First, wash and rinse your hair using the CHG soap.  Keep CHG soap away from ear canals and eyes.   Rinse completely, do not condition.  Hair extensions should be removed.    *Wash your face with your normal soap and  rinse.   *Apply the CHG solution to a clean wet washcloth.  Turn the water off or move away from the water spray to avoid premature rinsing of the CHG soap as you are applying.  Firmly lather your entire body from the neck down.  Do not use on your face.    *Pay special attention to the area(s) where your incision(s) will be located unless they are on your face.  Avoid scrubbing your skin too hard.  The important part is to have the CHG soap sit on your skin for 3 minutes.   *When the 3 minutes are up, turn on the water and rinse the CHG solution off your body completely.  *Do not wash with regular soap after you  have  used the CHG soap solution.  *Pat  yourself dry with a clean, freshly laundered towel.  *Do not apply powders, deodorants or lotions.  *Dress in clean freshly laundered night clothes.    *Be sure to sleep with clean freshly laundered sheets.    *Be aware the CHG will cause stains on fabrics; if you wash them with bleach after use.  Rinse your washcloth and other linens that have contact with CHG completely.  Use only non-chlorine detergents to launder the items  used.  *The morning of surgery is the fifth day.  Repeat the above steps and dress in clean comfortable clothing.     What is oral/dental rinse?  It is mouthwash.  It is a way of cleaning the he mouth with a germ-killing solution before your surgery.  The solution contains chlorhexidine, commonly known as CHG.  It is used inside the mouth to kill a bacteria known as Staphylococcus aureus.  Let your doctor know if you are allergic to Chlorhexidine.    Why do I need to use CHG oral/ dental rinse?  The CHG oral/dental rinse helps to kill bacteria in your mouth know as Staphylococcus aureus.  This reduces the risk of infection at the surgical site.    Using your CHG oral/dental rinse    STEPS:    Use your CHG oral/dental rinse after you brush your teeth the night before (at bedtime) and the morning of your surgery.  Follow all the directions on  your prescription label.  *Use the cap on the container to measure 15 ml  *Swish (gargle if you can) the mouthwash in your mouth for at least 30 seconds, (do not swallow) and spit out  *After you use your CHG rinse, do not rinse your mouth with water, drink or eat.  Please refer to the prescription label for the appropriate time to resume oral intake.    What side effects might I have using the CHG oral/dental rinse?  CHG rinse will stick you plaque on the teeth.  Brush and floss just before use.   Teeth brushing will help avoid staining of the plaque during  use.  Teeth brushing will help avoid staining of plaque during  use.    Who should I contact if I have questions about the CHG oral/dental rinse and or CHG soap?  Please call [facility], Pre-Admission testing at [phone] if you have any questions.    What you may be asked to bring to surgery:  ___Crutches, walker  ___CPAP machine  ___Urine specimen

## 2024-04-01 NOTE — CPM/PAT H&P
CPM/PAT Evaluation       Name: Misael Lopez (Misael Lopez)  /Age: 1950/73 y.o.     In-Person       Chief Complaint: Neck pain    73 yr old male with c/o neck pain.  Reports ongoing bilateral shoulder pain (R > L) radiating down to pelvic region (Right hip).  Does not recall any injury, only that pain began about one year ago.  Pain is ache with varing degrees of intensity ranging from 1-10/10 dependent on activity and position changes.  Has since progressively worsened and causing limitations with activity, specifically small motor skills.  Denies numbness/tingling, reports weakened  bilateral, dropping items, cannot  or pull items.  Reports intermittent balance issues with position changes causing some falls over the past year. Right hand dominant. Has followed up with chiropractor, massage therapy and physical therapy with no lasting relief. Is accompanied by spouse Beronica who is attentive and supportive.    Reports prior to pain was remaining otherwise physically active, denies cardiac or respiratory symptoms.  Denies past issues with anesthesia.         Past Medical History:   Diagnosis Date    Other specified health status     No pertinent past medical history       Past Surgical History:   Procedure Laterality Date    BREAST LUMPECTOMY  2015    Breast Surgery Lumpectomy    BUNIONECTOMY  2015    Simple Bunion Exostectomy (Silver Procedure)    CARPAL TUNNEL RELEASE  2015    Neuroplasty Decompression Median Nerve At Carpal Tunnel    GASTRIC BYPASS  2016    Gastric Surgery For Morbid Obesity Gastric Bypass    LITHOTRIPSY  2015    Renal Lithotripsy    NASAL SEPTUM SURGERY  2015    Nasal Septal Deviation Repair    OTHER SURGICAL HISTORY  2015    Hemilaminectomy    OTHER SURGICAL HISTORY  2021    Cardiac catheterization    OTHER SURGICAL HISTORY  2021    Cardiac catheterization with stent placement    SHOULDER SURGERY  2015    Shoulder  Surgery    VASECTOMY  09/16/2015    Surgery Vas Deferens Vasectomy       Patient  has no history on file for sexual activity.    No family history on file.    Allergies   Allergen Reactions    Nitroglycerin Other     Arrhythmia / Heart Block       Prior to Admission medications    Medication Sig Start Date End Date Taking? Authorizing Provider   aspirin 81 mg EC tablet Take 1 tablet (81 mg) by mouth 1 time. 5/28/09   Historical Provider, MD   atorvastatin (Lipitor) 20 mg tablet Take 1 tablet (20 mg) by mouth once daily. 1/22/24   Davonte Valdez MD   bromocriptine (Parlodel) 5 mg capsule Take 1 capsule (5 mg) by mouth once daily. 5/28/09   Historical Provider, MD   calcium acetate (Phoslo) 667 mg tablet Take 1 tablet (667 mg) by mouth once daily.    Historical Provider, MD   calcium polycarbophiL (FiberCon) 625 mg tablet Take 1 tablet (625 mg) by mouth once daily.    Historical Provider, MD   cyanocobalamin (Vitamin B-12) 100 mcg tablet Take 1 tablet (100 mcg) by mouth once daily.    Historical Provider, MD   mirtazapine (Remeron) 15 mg tablet Take 1 tablet (15 mg) by mouth once daily at bedtime. 3/19/18   Historical Provider, MD   multivitamin with minerals (multivit-min-iron fum-folic ac) tablet Take 1 tablet by mouth once daily. 9/16/15   Historical Provider, MD   pantoprazole (Protonix) 40 mg packet Take 1 packet (40 mg) by mouth once daily in the morning. Take before meals.    Historical Provider, MD   PROzac 20 mg capsule Take 1 capsule (20 mg) by mouth once daily.    Historical Provider, MD   ranolazine (Ranexa) 500 mg 12 hr tablet Take 1 tablet (500 mg) by mouth every 12 hours. 1/22/24   Davonte Valdez MD   tamsulosin (Flomax) 0.4 mg 24 hr capsule Take 1 capsule (0.4 mg) by mouth once daily. 5/20/14   Historical Provider, MD        Review of Systems    Constitutional: no fever, no chills and not feeling poorly.   Eyes: no eyesight problems.   ENT: hearing aids in use, no nosebleeds and no sore throat.    Cardiovascular: no chest pain, no palpitations and no extremity edema.   Respiratory: no shortness of breath, no wheezing, no cough and no sob with exertion, JOSE w/CPAP use.   Gastrointestinal: negative for abdominal pain, blood in stools or changes in bowel habits   Genitourinary: negative for dysuria, incontinence or changes in urinary habits   Musculoskeletal: see HPI   Integumentary: negative for lesions, rash or itching.   Neurological: negative for confusion, dizziness, fainting or difficulty walking.   Psychiatric: not suicidal, no anxiety and no depression.   All other systems have been reviewed and are negative for complaint.     Physical Exam  Constitutional:       General: No acute distress.     Aox3, pleasant and cooperative, appropriate mood and eye contact   HENT:      Head: Normocephalic.      Mouth/Throat: Mucous membranes moist & pink  Eyes:      Vision grossly intact, PERRLA, corrective lenses   Neck:      No carotid bruit, no JVD  Cardiovascular:      RRR, S1S2, no murmurs, rubs or gallops  Pulmonary:      Symmetric chest expansion, CTA, Room Air  Abdominal:      Soft non-tender, BSx4   Skin:     Warm, dry & intact   Extremities:      No gross deformities; abnormal gait   Neurological:      No focal deficit, Aox3, MONTES DE OCA x4  Psychiatric:      Pleasant & cooperative, appropriate affect    PAT AIRWAY:   Airway:     Mallampati::  I    TM distance::  >3 FB    Neck ROM::  Limited   Permanent    partials      Visit Vitals  /77   Pulse 56   Temp 35.6 °C (96.1 °F) (Temporal)   Resp 16       DASI Risk Score    No data to display       Caprini DVT Assessment    No data to display       Modified Frailty Index    No data to display       CHADS2 Stroke Risk  Current as of about an hour ago        N/A 3 - 100%: High Risk   2 - 3%: Medium Risk   0 - 2%: Low Risk     Last Change: N/A          This score determines the patient's risk of having a stroke if the patient has atrial fibrillation.        This score  is not applicable to this patient. Components are not calculated.          Revised Cardiac Risk Index    No data to display       Apfel Simplified Score    No data to display       Risk Analysis Index Results This Encounter    No data found in the last 1 encounters.         Assessment and Plan:     Followed by neurosurgery, Cervical spondylosis w/myelopathy; Myelomalacia of cervical cord    Cardiac clearance provided ([facility], 3/1/2024).    C3-4, C4-5, C5-6, C6-7 anterior disc excision/allograft fusion and plate fixation C3-7 w/flate plate cervical x-ray w/Dr Mauricio on 4/15/2024    Reviewed ecg dated 2/28/2024 - NSR (74 bpm)

## 2024-04-01 NOTE — H&P (VIEW-ONLY)
CPM/PAT Evaluation       Name: Misael Lopez (Misael Lopez)  /Age: 1950/73 y.o.     In-Person       Chief Complaint: Neck pain    73 yr old male with c/o neck pain.  Reports ongoing bilateral shoulder pain (R > L) radiating down to pelvic region (Right hip).  Does not recall any injury, only that pain began about one year ago.  Pain is ache with varing degrees of intensity ranging from 1-10/10 dependent on activity and position changes.  Has since progressively worsened and causing limitations with activity, specifically small motor skills.  Denies numbness/tingling, reports weakened  bilateral, dropping items, cannot  or pull items.  Reports intermittent balance issues with position changes causing some falls over the past year. Right hand dominant. Has followed up with chiropractor, massage therapy and physical therapy with no lasting relief. Is accompanied by spouse Beronica who is attentive and supportive.    Reports prior to pain was remaining otherwise physically active, denies cardiac or respiratory symptoms.  Denies past issues with anesthesia.         Past Medical History:   Diagnosis Date    Other specified health status     No pertinent past medical history       Past Surgical History:   Procedure Laterality Date    BREAST LUMPECTOMY  2015    Breast Surgery Lumpectomy    BUNIONECTOMY  2015    Simple Bunion Exostectomy (Silver Procedure)    CARPAL TUNNEL RELEASE  2015    Neuroplasty Decompression Median Nerve At Carpal Tunnel    GASTRIC BYPASS  2016    Gastric Surgery For Morbid Obesity Gastric Bypass    LITHOTRIPSY  2015    Renal Lithotripsy    NASAL SEPTUM SURGERY  2015    Nasal Septal Deviation Repair    OTHER SURGICAL HISTORY  2015    Hemilaminectomy    OTHER SURGICAL HISTORY  2021    Cardiac catheterization    OTHER SURGICAL HISTORY  2021    Cardiac catheterization with stent placement    SHOULDER SURGERY  2015    Shoulder  [facility]      Primary Care Physician   Kana Snow MD    Admission Date:   10/14/2023 11:51 AM    Discharge Date:    10/18/2023     Consultants     IP Consult Orders (From admission, onward)     Start     Ordered    10/16/23 0849  Inpatient consult to Hospitalist  ONE TIME        Provider:  Benny Orosco MD    10/16/23 0849    10/16/23 0844  Inpatient consult to Endocrinology  ONE TIME        Provider:  Adán French MD    10/16/23 0843    10/14/23 1511  Inpatient consult to GI  ONE TIME        Provider:  Quincy Cueto MD    10/14/23 1511                Discharge Diagnosis   1.  Severe sepsis  2.  Acute sigmoid diverticulitis  3.  Acute kidney injury  4.  Hypotonic hyponatremia  5.  Acute anemia likely hemodilutional no evidence of active bleeding at this time.  6.  [place]'s disease  7.  Hypothyroidism  8.  Hyperlipidemia  9.  CHF with diastolic dysfunction    Hospital Course   Patient is a 67-year-old female, past medical history of [place]'s disease, essential hypertension, hypercholesterolemia, hypothyroidism, CHF diastolic with EF of 69%.  Initially presented to the emergency room with complaints of nausea vomiting and abdominal pain.  Subsequent evaluation noted the patient had acute sigmoid diverticulitis with concern for developing severe sepsis and possible adrenal crisis in the setting of [place]'s disease initially requiring ICU level of care.  She was started on broad-spectrum antibiotics.  She was started on stress dose steroids.  Stabilized transferred to the general medical floor.  She was seen by gastroenterology, who recommended to complete a course of antibiotics upon discharge.  She will need a colonoscopy in 6 to 8 weeks with follow-up in 2 to 3 weeks.  She is to continue on low fiber diet for 3 to 4 weeks outpatient.  With initial concern for adrenal crisis she was placed on stress dose steroids.  Seen by endocrinology who recommended continue current steroid  Surgery    VASECTOMY  09/16/2015    Surgery Vas Deferens Vasectomy       Patient  has no history on file for sexual activity.    No family history on file.    Allergies   Allergen Reactions    Nitroglycerin Other     Arrhythmia / Heart Block       Prior to Admission medications    Medication Sig Start Date End Date Taking? Authorizing Provider   aspirin 81 mg EC tablet Take 1 tablet (81 mg) by mouth 1 time. 5/28/09   Historical Provider, MD   atorvastatin (Lipitor) 20 mg tablet Take 1 tablet (20 mg) by mouth once daily. 1/22/24   Davonte Valdez MD   bromocriptine (Parlodel) 5 mg capsule Take 1 capsule (5 mg) by mouth once daily. 5/28/09   Historical Provider, MD   calcium acetate (Phoslo) 667 mg tablet Take 1 tablet (667 mg) by mouth once daily.    Historical Provider, MD   calcium polycarbophiL (FiberCon) 625 mg tablet Take 1 tablet (625 mg) by mouth once daily.    Historical Provider, MD   cyanocobalamin (Vitamin B-12) 100 mcg tablet Take 1 tablet (100 mcg) by mouth once daily.    Historical Provider, MD   mirtazapine (Remeron) 15 mg tablet Take 1 tablet (15 mg) by mouth once daily at bedtime. 3/19/18   Historical Provider, MD   multivitamin with minerals (multivit-min-iron fum-folic ac) tablet Take 1 tablet by mouth once daily. 9/16/15   Historical Provider, MD   pantoprazole (Protonix) 40 mg packet Take 1 packet (40 mg) by mouth once daily in the morning. Take before meals.    Historical Provider, MD   PROzac 20 mg capsule Take 1 capsule (20 mg) by mouth once daily.    Historical Provider, MD   ranolazine (Ranexa) 500 mg 12 hr tablet Take 1 tablet (500 mg) by mouth every 12 hours. 1/22/24   Davonte Valdez MD   tamsulosin (Flomax) 0.4 mg 24 hr capsule Take 1 capsule (0.4 mg) by mouth once daily. 5/20/14   Historical Provider, MD        Review of Systems    Constitutional: no fever, no chills and not feeling poorly.   Eyes: no eyesight problems.   ENT: hearing aids in use, no nosebleeds and no sore throat.  regimen with instructions to taper down on discharge.  Patient to continue on fludrocortisone 0.5 mg p.o. every 48 hours.  Patient to follow-up outpatient.  By 10/18/2023 patient was stable for discharge.    Discharge Physical Exam     Vital Signs:  Temp:  [97.9 °F (36.6 °C)-99 °F (37.2 °C)] 99 °F (37.2 °C)  Heart Rate:  [69-74] 69  Resp:  [16-18] 16  BP: (128-138)/(65-70) 131/65  SpO2 Readings from Last 1 Encounters:   10/18/23 98%            General:  Obese female in NAD.  HEENT:  Normal  ABDOMINAL:  Soft, Non-tender, Non-distended, Normal bowel sounds.    Neurologic:  Alert, Oriented x 3, Normal motor function, No focal deficits.        Psychiatric:  Cooperative, Appropriate mood & affect.       Discharge lab results/Cultures/Imaging/Echocardiogram     Labs:  Recent Results (from the past 24 hour(s))   Comprehensive Metabolic Panel    Collection Time: 10/18/23  6:25 AM   Result Value Ref Range    Fasting Status      Sodium 139 135 - 145 mmol/L    Potassium 3.6 3.4 - 5.1 mmol/L    Chloride 105 97 - 110 mmol/L    Carbon Dioxide 25 21 - 32 mmol/L    Anion Gap 13 7 - 19 mmol/L    Glucose 107 (H) 70 - 99 mg/dL    BUN 20 6 - 20 mg/dL    Creatinine 1.05 (H) 0.51 - 0.95 mg/dL    Glomerular Filtration Rate 59 (L) >=60    BUN/Cr 19 7 - 25    Calcium 8.2 (L) 8.4 - 10.2 mg/dL    Bilirubin, Total 0.2 0.2 - 1.0 mg/dL    GOT/AST 21 <=37 Units/L    GPT/ALT 25 <64 Units/L    Alkaline Phosphatase 47 45 - 117 Units/L    Albumin 2.7 (L) 3.6 - 5.1 g/dL    Protein, Total 6.3 (L) 6.4 - 8.2 g/dL    Globulin 3.6 2.0 - 4.0 g/dL    A/G Ratio 0.8 (L) 1.0 - 2.4   Magnesium    Collection Time: 10/18/23  6:25 AM   Result Value Ref Range    Magnesium 1.9 1.7 - 2.4 mg/dL   Phosphorus    Collection Time: 10/18/23  6:25 AM   Result Value Ref Range    Phosphorus 2.5 2.4 - 4.7 mg/dL   CBC with Automated Differential (performable only)    Collection Time: 10/18/23  6:25 AM   Result Value Ref Range    WBC 5.4 4.2 - 11.0 K/mcL    RBC 3.71 (L) 4.00 -    Cardiovascular: no chest pain, no palpitations and no extremity edema.   Respiratory: no shortness of breath, no wheezing, no cough and no sob with exertion, JOSE w/CPAP use.   Gastrointestinal: negative for abdominal pain, blood in stools or changes in bowel habits   Genitourinary: negative for dysuria, incontinence or changes in urinary habits   Musculoskeletal: see HPI   Integumentary: negative for lesions, rash or itching.   Neurological: negative for confusion, dizziness, fainting or difficulty walking.   Psychiatric: not suicidal, no anxiety and no depression.   All other systems have been reviewed and are negative for complaint.     Physical Exam  Constitutional:       General: No acute distress.     Aox3, pleasant and cooperative, appropriate mood and eye contact   HENT:      Head: Normocephalic.      Mouth/Throat: Mucous membranes moist & pink  Eyes:      Vision grossly intact, PERRLA, corrective lenses   Neck:      No carotid bruit, no JVD  Cardiovascular:      RRR, S1S2, no murmurs, rubs or gallops  Pulmonary:      Symmetric chest expansion, CTA, Room Air  Abdominal:      Soft non-tender, BSx4   Skin:     Warm, dry & intact   Extremities:      No gross deformities; abnormal gait   Neurological:      No focal deficit, Aox3, MONTES DE OCA x4  Psychiatric:      Pleasant & cooperative, appropriate affect    PAT AIRWAY:   Airway:     Mallampati::  I    TM distance::  >3 FB    Neck ROM::  Limited   Permanent    partials      Visit Vitals  /77   Pulse 56   Temp 35.6 °C (96.1 °F) (Temporal)   Resp 16       DASI Risk Score    No data to display       Caprini DVT Assessment    No data to display       Modified Frailty Index    No data to display       CHADS2 Stroke Risk  Current as of about an hour ago        N/A 3 - 100%: High Risk   2 - 3%: Medium Risk   0 - 2%: Low Risk     Last Change: N/A          This score determines the patient's risk of having a stroke if the patient has atrial fibrillation.        This score  5.20 mil/mcL    HGB 9.8 (L) 12.0 - 15.5 g/dL    HCT 32.5 (L) 36.0 - 46.5 %    MCV 87.6 78.0 - 100.0 fl    MCH 26.4 26.0 - 34.0 pg    MCHC 30.2 (L) 32.0 - 36.5 g/dL    RDW-CV 13.7 11.0 - 15.0 %    RDW-SD 43.8 39.0 - 50.0 fL     140 - 450 K/mcL    NRBC 0 <=0 /100 WBC    Neutrophil, Percent 58 %    Lymphocytes, Percent 33 %    Mono, Percent 7 %    Eosinophils, Percent 1 %    Basophils, Percent 0 %    Immature Granulocytes 1 %    Absolute Neutrophils 3.2 1.8 - 7.7 K/mcL    Absolute Lymphocytes 1.7 1.0 - 4.0 K/mcL    Absolute Monocytes 0.4 0.3 - 0.9 K/mcL    Absolute Eosinophils  0.1 0.0 - 0.5 K/mcL    Absolute Basophils 0.0 0.0 - 0.3 K/mcL    Absolute Immature Granulocytes 0.0 0.0 - 0.2 K/mcL             Culture results:  Microbiology Results     None                Imaging:    XR CHEST AP OR PA   Final Result   1. No acute cardiopulmonary abnormality, radiographically.      Electronically Signed by: TROY CHENG MD    Signed on: 10/15/2023 8:02 AM    Workstation ID: [id number]      CT ABDOMEN PELVIS W CONTRAST   Final Result   Acute diverticulitis of the proximal sigmoid colon. No evidence of   perforation or abscess.      Electronically Signed by: RAMÍRZE PHELAN M.D.    Signed on: 10/14/2023 2:44 PM    Workstation ID: [id number]       No results found for this or any previous visit (from the past 4464 hour(s)).           Echocardiograms during the hospitalization:        *[facility]*  [address] [address] [phone]  Transthoracic Echocardiogram (TTE)     Patient: Tsering Cheng    Study Date/Time:         Oct 16 2023 2:31PM  MRN:     [id number]         FIN#:                    [id number]  :     1956       Ht/Wt:                   172cm 144.7kg  Age:     67               BSA/BMI:                 2.71m^2 48.9kg/m^2  Gender:  F                Baseline BP:             127 / 65  Ordering Physician:     Sunitha Rose     Referring Physician:    Sunitha Rose      Attending Physician:    Benny Orosco  Performing Physician:   Angel, Cardiology  Diagnostic Physician:   Brendan Carlson MD  Sonographer:            Renetta Sims     ------------------------------------------------------------------------------  INDICATIONS:   Shock.     ------------------------------------------------------------------------------  STUDY CONCLUSIONS  SUMMARY:     1. Left ventricle: The cavity size is normal. Wall thickness is normal.     Systolic function is hyperdynamic. The ejection fraction was measured by     biplane method of disks. The ejection fraction is 69%.  2. Left atrium: The atrium is moderately dilated.  3. Right ventricle: The cavity size is mildly dilated. Wall thickness is     normal. Systolic function is normal.  4. Pericardium, extracardiac: A trivial pericardial effusion is identified.             Procedures During the hospitalization   None    Pending test results on discharge:   None    Discharge Home Medications        Summary of your Discharge Medications      Take these Medications      Details   fludrocortisone 0.1 MG tablet  Commonly known as: FLORINEF   Take 0.5 tablets by mouth every 48 hours.     hydroCORTisone 100 MG injection  Commonly known as: Solu-CORTEF   Inject 2 mLs into the muscle as needed (For symptoms of adrenal crisis/severe adrenal insufficiency, if recurrent nausea/vomiting, unable to take PO). Please visit local urgent care or ER after administering     hydroCORTisone 5 MG tablet  Commonly known as: CORTEF   Take 3 tablets by mouth every morning AND 2 tablets every afternoon.     Syringe/Needle (Disp) 23G X 1\" 3 ML Misc  Commonly known as: B-D LUER-POPEYE SYRINGE   As needed for hydrocortisone PRN injection            Discharge follow up plan:   Kana Snow MD  [address] [address]  [phone]    Follow up in 1 week(s)      Adán French MD  [address] [address]  [phone]    Follow up in 2  is not applicable to this patient. Components are not calculated.          Revised Cardiac Risk Index    No data to display       Apfel Simplified Score    No data to display       Risk Analysis Index Results This Encounter    No data found in the last 1 encounters.         Assessment and Plan:     Followed by neurosurgery, Cervical spondylosis w/myelopathy; Myelomalacia of cervical cord    Cardiac clearance provided ([facility], 3/1/2024).    C3-4, C4-5, C5-6, C6-7 anterior disc excision/allograft fusion and plate fixation C3-7 w/flate plate cervical x-ray w/Dr Mauricio on 4/15/2024    Reviewed ecg dated 2/28/2024 - NSR (74 bpm)       week(s)         Disposition: Home  Condition at discharge: Stable    Time  spent greater than 35 minutes on the discharge process    Kana Snow MD to be notified.      [facility]

## 2024-04-02 RX ORDER — CHLORHEXIDINE GLUCONATE ORAL RINSE 1.2 MG/ML
15 SOLUTION DENTAL DAILY
Qty: 30 ML | Refills: 0 | Status: SHIPPED | OUTPATIENT
Start: 2024-04-02 | End: 2024-04-04

## 2024-04-02 RX ORDER — CHLORHEXIDINE GLUCONATE 40 MG/ML
SOLUTION TOPICAL DAILY
Qty: 118 ML | Refills: 0 | Status: SHIPPED | OUTPATIENT
Start: 2024-04-02 | End: 2024-04-07

## 2024-04-03 LAB — STAPHYLOCOCCUS SPEC CULT: NORMAL

## 2024-04-15 ENCOUNTER — APPOINTMENT (OUTPATIENT)
Dept: RADIOLOGY | Facility: HOSPITAL | Age: 74
DRG: 472 | End: 2024-04-15
Payer: MEDICARE

## 2024-04-15 ENCOUNTER — HOSPITAL ENCOUNTER (INPATIENT)
Facility: HOSPITAL | Age: 74
LOS: 1 days | Discharge: HOME | DRG: 472 | End: 2024-04-16
Attending: NEUROLOGICAL SURGERY | Admitting: NEUROLOGICAL SURGERY
Payer: MEDICARE

## 2024-04-15 ENCOUNTER — ANESTHESIA EVENT (OUTPATIENT)
Dept: OPERATING ROOM | Facility: HOSPITAL | Age: 74
DRG: 472 | End: 2024-04-15
Payer: MEDICARE

## 2024-04-15 ENCOUNTER — ANESTHESIA (OUTPATIENT)
Dept: OPERATING ROOM | Facility: HOSPITAL | Age: 74
DRG: 472 | End: 2024-04-15
Payer: MEDICARE

## 2024-04-15 DIAGNOSIS — I47.29 NSVT (NONSUSTAINED VENTRICULAR TACHYCARDIA) (MULTI): ICD-10-CM

## 2024-04-15 DIAGNOSIS — K59.03 DRUG-INDUCED CONSTIPATION: ICD-10-CM

## 2024-04-15 DIAGNOSIS — E78.00 PURE HYPERCHOLESTEROLEMIA: ICD-10-CM

## 2024-04-15 DIAGNOSIS — I20.9 ANGINA PECTORIS (CMS-HCC): ICD-10-CM

## 2024-04-15 DIAGNOSIS — M47.12 CERVICAL SPONDYLOSIS WITH MYELOPATHY: Primary | ICD-10-CM

## 2024-04-15 DIAGNOSIS — G95.89 MYELOMALACIA OF CERVICAL CORD (MULTI): ICD-10-CM

## 2024-04-15 DIAGNOSIS — I10 HYPERTENSION, BENIGN: ICD-10-CM

## 2024-04-15 DIAGNOSIS — Z95.5 HISTORY OF CORONARY ARTERY STENT PLACEMENT: ICD-10-CM

## 2024-04-15 DIAGNOSIS — G89.18 ACUTE POST-OPERATIVE PAIN: ICD-10-CM

## 2024-04-15 LAB
ABO GROUP (TYPE) IN BLOOD: NORMAL
RH FACTOR (ANTIGEN D): NORMAL

## 2024-04-15 PROCEDURE — C1751 CATH, INF, PER/CENT/MIDLINE: HCPCS | Performed by: NEUROLOGICAL SURGERY

## 2024-04-15 PROCEDURE — A22551 PR ARTHRODESIS ANT INTERBODY INC DISCECTOMY, CERVICAL BELOW C2: Performed by: ANESTHESIOLOGY

## 2024-04-15 PROCEDURE — A4217 STERILE WATER/SALINE, 500 ML: HCPCS | Performed by: NEUROLOGICAL SURGERY

## 2024-04-15 PROCEDURE — 2500000005 HC RX 250 GENERAL PHARMACY W/O HCPCS: Performed by: NEUROLOGICAL SURGERY

## 2024-04-15 PROCEDURE — 7100000001 HC RECOVERY ROOM TIME - INITIAL BASE CHARGE: Performed by: NEUROLOGICAL SURGERY

## 2024-04-15 PROCEDURE — 72020 X-RAY EXAM OF SPINE 1 VIEW: CPT | Mod: 76

## 2024-04-15 PROCEDURE — 22551 ARTHRD ANT NTRBDY CERVICAL: CPT | Performed by: NEUROLOGICAL SURGERY

## 2024-04-15 PROCEDURE — A22551 PR ARTHRODESIS ANT INTERBODY INC DISCECTOMY, CERVICAL BELOW C2: Performed by: ANESTHESIOLOGIST ASSISTANT

## 2024-04-15 PROCEDURE — 72020 X-RAY EXAM OF SPINE 1 VIEW: CPT

## 2024-04-15 PROCEDURE — 3600000018 HC OR TIME - INITIAL BASE CHARGE - PROCEDURE LEVEL SIX: Performed by: NEUROLOGICAL SURGERY

## 2024-04-15 PROCEDURE — 0RB30ZZ EXCISION OF CERVICAL VERTEBRAL DISC, OPEN APPROACH: ICD-10-PCS | Performed by: NEUROLOGICAL SURGERY

## 2024-04-15 PROCEDURE — 2500000004 HC RX 250 GENERAL PHARMACY W/ HCPCS (ALT 636 FOR OP/ED): Performed by: ANESTHESIOLOGY

## 2024-04-15 PROCEDURE — 2720000007 HC OR 272 NO HCPCS: Performed by: NEUROLOGICAL SURGERY

## 2024-04-15 PROCEDURE — 20936 SP BONE AGRFT LOCAL ADD-ON: CPT | Performed by: NEUROLOGICAL SURGERY

## 2024-04-15 PROCEDURE — 22552 ARTHRD ANT NTRBD CERVICAL EA: CPT | Performed by: NEUROLOGICAL SURGERY

## 2024-04-15 PROCEDURE — 7100000002 HC RECOVERY ROOM TIME - EACH INCREMENTAL 1 MINUTE: Performed by: NEUROLOGICAL SURGERY

## 2024-04-15 PROCEDURE — 1100000001 HC PRIVATE ROOM DAILY

## 2024-04-15 PROCEDURE — 3600000017 HC OR TIME - EACH INCREMENTAL 1 MINUTE - PROCEDURE LEVEL SIX: Performed by: NEUROLOGICAL SURGERY

## 2024-04-15 PROCEDURE — 2500000001 HC RX 250 WO HCPCS SELF ADMINISTERED DRUGS (ALT 637 FOR MEDICARE OP): Performed by: NEUROLOGICAL SURGERY

## 2024-04-15 PROCEDURE — 0RG20K0 FUSION OF 2 OR MORE CERVICAL VERTEBRAL JOINTS WITH NONAUTOLOGOUS TISSUE SUBSTITUTE, ANTERIOR APPROACH, ANTERIOR COLUMN, OPEN APPROACH: ICD-10-PCS | Performed by: NEUROLOGICAL SURGERY

## 2024-04-15 PROCEDURE — 36620 INSERTION CATHETER ARTERY: CPT | Performed by: ANESTHESIOLOGY

## 2024-04-15 PROCEDURE — 2780000003 HC OR 278 NO HCPCS: Performed by: NEUROLOGICAL SURGERY

## 2024-04-15 PROCEDURE — 20931 SP BONE ALGRFT STRUCT ADD-ON: CPT | Performed by: NEUROLOGICAL SURGERY

## 2024-04-15 PROCEDURE — C1713 ANCHOR/SCREW BN/BN,TIS/BN: HCPCS | Performed by: NEUROLOGICAL SURGERY

## 2024-04-15 PROCEDURE — 2500000004 HC RX 250 GENERAL PHARMACY W/ HCPCS (ALT 636 FOR OP/ED): Performed by: NEUROLOGICAL SURGERY

## 2024-04-15 PROCEDURE — C9359 IMPLNT,BON VOID FILLER-PUTTY: HCPCS | Performed by: NEUROLOGICAL SURGERY

## 2024-04-15 PROCEDURE — 2500000005 HC RX 250 GENERAL PHARMACY W/O HCPCS: Performed by: ANESTHESIOLOGIST ASSISTANT

## 2024-04-15 PROCEDURE — 36415 COLL VENOUS BLD VENIPUNCTURE: CPT | Performed by: NEUROLOGICAL SURGERY

## 2024-04-15 PROCEDURE — 3700000001 HC GENERAL ANESTHESIA TIME - INITIAL BASE CHARGE: Performed by: NEUROLOGICAL SURGERY

## 2024-04-15 PROCEDURE — 2500000004 HC RX 250 GENERAL PHARMACY W/ HCPCS (ALT 636 FOR OP/ED): Performed by: ANESTHESIOLOGIST ASSISTANT

## 2024-04-15 PROCEDURE — 3700000002 HC GENERAL ANESTHESIA TIME - EACH INCREMENTAL 1 MINUTE: Performed by: NEUROLOGICAL SURGERY

## 2024-04-15 PROCEDURE — 99100 ANES PT EXTEME AGE<1 YR&>70: CPT | Performed by: ANESTHESIOLOGY

## 2024-04-15 PROCEDURE — 22846 INSERT SPINE FIXATION DEVICE: CPT | Performed by: NEUROLOGICAL SURGERY

## 2024-04-15 DEVICE — SCREW, ACP, SELF DRILL, 3.5 X 17MM, VARIABLE: Type: IMPLANTABLE DEVICE | Site: SPINE CERVICAL | Status: FUNCTIONAL

## 2024-04-15 DEVICE — IMPLANTABLE DEVICE: Type: IMPLANTABLE DEVICE | Site: SPINE CERVICAL | Status: FUNCTIONAL

## 2024-04-15 DEVICE — ALLOGRAFT, TRIAD LORDOTIC 7 X 11 X 14: Type: IMPLANTABLE DEVICE | Site: SPINE CERVICAL | Status: FUNCTIONAL

## 2024-04-15 RX ORDER — ACETAMINOPHEN 325 MG/1
975 TABLET ORAL EVERY 8 HOURS
Status: DISCONTINUED | OUTPATIENT
Start: 2024-04-15 | End: 2024-04-16 | Stop reason: HOSPADM

## 2024-04-15 RX ORDER — MIRTAZAPINE 15 MG/1
15 TABLET, FILM COATED ORAL NIGHTLY
Status: DISCONTINUED | OUTPATIENT
Start: 2024-04-15 | End: 2024-04-16 | Stop reason: HOSPADM

## 2024-04-15 RX ORDER — PROPOFOL 10 MG/ML
INJECTION, EMULSION INTRAVENOUS AS NEEDED
Status: DISCONTINUED | OUTPATIENT
Start: 2024-04-15 | End: 2024-04-15

## 2024-04-15 RX ORDER — SODIUM CHLORIDE, SODIUM LACTATE, POTASSIUM CHLORIDE, CALCIUM CHLORIDE 600; 310; 30; 20 MG/100ML; MG/100ML; MG/100ML; MG/100ML
100 INJECTION, SOLUTION INTRAVENOUS CONTINUOUS
Status: DISCONTINUED | OUTPATIENT
Start: 2024-04-15 | End: 2024-04-16 | Stop reason: HOSPADM

## 2024-04-15 RX ORDER — DIAZEPAM 5 MG/ML
2.5 INJECTION, SOLUTION INTRAMUSCULAR; INTRAVENOUS ONCE
Status: COMPLETED | OUTPATIENT
Start: 2024-04-15 | End: 2024-04-15

## 2024-04-15 RX ORDER — LIDOCAINE HCL/PF 100 MG/5ML
SYRINGE (ML) INTRAVENOUS AS NEEDED
Status: DISCONTINUED | OUTPATIENT
Start: 2024-04-15 | End: 2024-04-15

## 2024-04-15 RX ORDER — ONDANSETRON HYDROCHLORIDE 2 MG/ML
INJECTION, SOLUTION INTRAVENOUS AS NEEDED
Status: DISCONTINUED | OUTPATIENT
Start: 2024-04-15 | End: 2024-04-15

## 2024-04-15 RX ORDER — ONDANSETRON HYDROCHLORIDE 2 MG/ML
4 INJECTION, SOLUTION INTRAVENOUS EVERY 8 HOURS PRN
Status: DISCONTINUED | OUTPATIENT
Start: 2024-04-15 | End: 2024-04-16 | Stop reason: HOSPADM

## 2024-04-15 RX ORDER — MORPHINE SULFATE 2 MG/ML
2 INJECTION, SOLUTION INTRAMUSCULAR; INTRAVENOUS EVERY 2 HOUR PRN
Status: DISCONTINUED | OUTPATIENT
Start: 2024-04-15 | End: 2024-04-16 | Stop reason: HOSPADM

## 2024-04-15 RX ORDER — RANOLAZINE 500 MG/1
500 TABLET, EXTENDED RELEASE ORAL EVERY 12 HOURS
Status: DISCONTINUED | OUTPATIENT
Start: 2024-04-15 | End: 2024-04-16 | Stop reason: HOSPADM

## 2024-04-15 RX ORDER — CEFAZOLIN 1 G/1
INJECTION, POWDER, FOR SOLUTION INTRAVENOUS AS NEEDED
Status: DISCONTINUED | OUTPATIENT
Start: 2024-04-15 | End: 2024-04-15

## 2024-04-15 RX ORDER — SODIUM CHLORIDE, SODIUM LACTATE, POTASSIUM CHLORIDE, CALCIUM CHLORIDE 600; 310; 30; 20 MG/100ML; MG/100ML; MG/100ML; MG/100ML
INJECTION, SOLUTION INTRAVENOUS CONTINUOUS PRN
Status: DISCONTINUED | OUTPATIENT
Start: 2024-04-15 | End: 2024-04-15

## 2024-04-15 RX ORDER — DOCUSATE SODIUM 100 MG/1
100 CAPSULE, LIQUID FILLED ORAL 2 TIMES DAILY
Status: DISCONTINUED | OUTPATIENT
Start: 2024-04-15 | End: 2024-04-16 | Stop reason: HOSPADM

## 2024-04-15 RX ORDER — NALOXONE HYDROCHLORIDE 1 MG/ML
0.2 INJECTION INTRAMUSCULAR; INTRAVENOUS; SUBCUTANEOUS EVERY 5 MIN PRN
Status: DISCONTINUED | OUTPATIENT
Start: 2024-04-15 | End: 2024-04-16 | Stop reason: HOSPADM

## 2024-04-15 RX ORDER — FENTANYL CITRATE 50 UG/ML
INJECTION, SOLUTION INTRAMUSCULAR; INTRAVENOUS AS NEEDED
Status: DISCONTINUED | OUTPATIENT
Start: 2024-04-15 | End: 2024-04-15

## 2024-04-15 RX ORDER — ACETAMINOPHEN 325 MG/1
650 TABLET ORAL EVERY 4 HOURS PRN
Status: DISCONTINUED | OUTPATIENT
Start: 2024-04-15 | End: 2024-04-15 | Stop reason: HOSPADM

## 2024-04-15 RX ORDER — MIDAZOLAM HYDROCHLORIDE 1 MG/ML
1 INJECTION, SOLUTION INTRAMUSCULAR; INTRAVENOUS ONCE AS NEEDED
Status: DISCONTINUED | OUTPATIENT
Start: 2024-04-15 | End: 2024-04-15 | Stop reason: HOSPADM

## 2024-04-15 RX ORDER — ALBUTEROL SULFATE 0.83 MG/ML
2.5 SOLUTION RESPIRATORY (INHALATION) ONCE AS NEEDED
Status: DISCONTINUED | OUTPATIENT
Start: 2024-04-15 | End: 2024-04-15 | Stop reason: HOSPADM

## 2024-04-15 RX ORDER — CEFAZOLIN SODIUM 2 G/100ML
2 INJECTION, SOLUTION INTRAVENOUS ONCE
Status: DISCONTINUED | OUTPATIENT
Start: 2024-04-15 | End: 2024-04-16 | Stop reason: HOSPADM

## 2024-04-15 RX ORDER — CEFAZOLIN SODIUM 2 G/100ML
2 INJECTION, SOLUTION INTRAVENOUS EVERY 8 HOURS
Status: ACTIVE | OUTPATIENT
Start: 2024-04-15 | End: 2024-04-16

## 2024-04-15 RX ORDER — BROMOCRIPTINE MESYLATE 2.5 MG/1
5 TABLET ORAL DAILY
Status: DISCONTINUED | OUTPATIENT
Start: 2024-04-15 | End: 2024-04-16 | Stop reason: HOSPADM

## 2024-04-15 RX ORDER — ATORVASTATIN CALCIUM 20 MG/1
20 TABLET, FILM COATED ORAL DAILY
Status: DISCONTINUED | OUTPATIENT
Start: 2024-04-15 | End: 2024-04-16 | Stop reason: HOSPADM

## 2024-04-15 RX ORDER — REMIFENTANIL HYDROCHLORIDE 1 MG/ML
INJECTION, POWDER, LYOPHILIZED, FOR SOLUTION INTRAVENOUS AS NEEDED
Status: DISCONTINUED | OUTPATIENT
Start: 2024-04-15 | End: 2024-04-15

## 2024-04-15 RX ORDER — ROCURONIUM BROMIDE 10 MG/ML
INJECTION, SOLUTION INTRAVENOUS AS NEEDED
Status: DISCONTINUED | OUTPATIENT
Start: 2024-04-15 | End: 2024-04-15

## 2024-04-15 RX ORDER — METHYLPREDNISOLONE ACETATE 40 MG/ML
INJECTION, SUSPENSION INTRA-ARTICULAR; INTRALESIONAL; INTRAMUSCULAR; SOFT TISSUE AS NEEDED
Status: DISCONTINUED | OUTPATIENT
Start: 2024-04-15 | End: 2024-04-15 | Stop reason: HOSPADM

## 2024-04-15 RX ORDER — OXYCODONE HYDROCHLORIDE 5 MG/1
10 TABLET ORAL EVERY 4 HOURS PRN
Status: DISCONTINUED | OUTPATIENT
Start: 2024-04-15 | End: 2024-04-16 | Stop reason: HOSPADM

## 2024-04-15 RX ORDER — DEXTROSE 50 % IN WATER (D50W) INTRAVENOUS SYRINGE
25
Status: DISCONTINUED | OUTPATIENT
Start: 2024-04-15 | End: 2024-04-16 | Stop reason: HOSPADM

## 2024-04-15 RX ORDER — SODIUM CHLORIDE, SODIUM LACTATE, POTASSIUM CHLORIDE, CALCIUM CHLORIDE 600; 310; 30; 20 MG/100ML; MG/100ML; MG/100ML; MG/100ML
100 INJECTION, SOLUTION INTRAVENOUS CONTINUOUS
Status: DISCONTINUED | OUTPATIENT
Start: 2024-04-15 | End: 2024-04-15 | Stop reason: HOSPADM

## 2024-04-15 RX ORDER — CYCLOBENZAPRINE HCL 10 MG
10 TABLET ORAL 3 TIMES DAILY PRN
Status: DISCONTINUED | OUTPATIENT
Start: 2024-04-15 | End: 2024-04-16 | Stop reason: HOSPADM

## 2024-04-15 RX ORDER — SODIUM CHLORIDE 0.9 G/100ML
IRRIGANT IRRIGATION AS NEEDED
Status: DISCONTINUED | OUTPATIENT
Start: 2024-04-15 | End: 2024-04-15 | Stop reason: HOSPADM

## 2024-04-15 RX ORDER — MEPERIDINE HYDROCHLORIDE 25 MG/ML
12.5 INJECTION INTRAMUSCULAR; INTRAVENOUS; SUBCUTANEOUS EVERY 10 MIN PRN
Status: DISCONTINUED | OUTPATIENT
Start: 2024-04-15 | End: 2024-04-15 | Stop reason: HOSPADM

## 2024-04-15 RX ORDER — PHENYLEPHRINE HYDROCHLORIDE 10 MG/ML
INJECTION INTRAVENOUS AS NEEDED
Status: DISCONTINUED | OUTPATIENT
Start: 2024-04-15 | End: 2024-04-15

## 2024-04-15 RX ORDER — LABETALOL HYDROCHLORIDE 5 MG/ML
5 INJECTION, SOLUTION INTRAVENOUS ONCE AS NEEDED
Status: DISCONTINUED | OUTPATIENT
Start: 2024-04-15 | End: 2024-04-15 | Stop reason: HOSPADM

## 2024-04-15 RX ORDER — CEFAZOLIN SODIUM 2 G/100ML
INJECTION, SOLUTION INTRAVENOUS
Status: DISPENSED
Start: 2024-04-15 | End: 2024-04-15

## 2024-04-15 RX ORDER — LIDOCAINE HYDROCHLORIDE 10 MG/ML
0.1 INJECTION INFILTRATION; PERINEURAL ONCE
Status: DISCONTINUED | OUTPATIENT
Start: 2024-04-15 | End: 2024-04-15 | Stop reason: HOSPADM

## 2024-04-15 RX ORDER — PHENYLEPHRINE 10 MG/250 ML(40 MCG/ML)IN 0.9 % SOD.CHLORIDE INTRAVENOUS
CONTINUOUS PRN
Status: DISCONTINUED | OUTPATIENT
Start: 2024-04-15 | End: 2024-04-15

## 2024-04-15 RX ORDER — NORETHINDRONE AND ETHINYL ESTRADIOL 0.5-0.035
KIT ORAL AS NEEDED
Status: DISCONTINUED | OUTPATIENT
Start: 2024-04-15 | End: 2024-04-15

## 2024-04-15 RX ORDER — LIDOCAINE HYDROCHLORIDE AND EPINEPHRINE 10; 10 MG/ML; UG/ML
INJECTION, SOLUTION INFILTRATION; PERINEURAL AS NEEDED
Status: DISCONTINUED | OUTPATIENT
Start: 2024-04-15 | End: 2024-04-15 | Stop reason: HOSPADM

## 2024-04-15 RX ORDER — TAMSULOSIN HYDROCHLORIDE 0.4 MG/1
0.4 CAPSULE ORAL DAILY
Status: DISCONTINUED | OUTPATIENT
Start: 2024-04-15 | End: 2024-04-16 | Stop reason: HOSPADM

## 2024-04-15 RX ORDER — HYDRALAZINE HYDROCHLORIDE 20 MG/ML
5 INJECTION INTRAMUSCULAR; INTRAVENOUS EVERY 30 MIN PRN
Status: DISCONTINUED | OUTPATIENT
Start: 2024-04-15 | End: 2024-04-15 | Stop reason: HOSPADM

## 2024-04-15 RX ORDER — MIDAZOLAM HYDROCHLORIDE 1 MG/ML
INJECTION, SOLUTION INTRAMUSCULAR; INTRAVENOUS AS NEEDED
Status: DISCONTINUED | OUTPATIENT
Start: 2024-04-15 | End: 2024-04-15

## 2024-04-15 RX ORDER — ONDANSETRON HYDROCHLORIDE 2 MG/ML
4 INJECTION, SOLUTION INTRAVENOUS ONCE AS NEEDED
Status: DISCONTINUED | OUTPATIENT
Start: 2024-04-15 | End: 2024-04-15 | Stop reason: HOSPADM

## 2024-04-15 RX ORDER — OXYCODONE HYDROCHLORIDE 5 MG/1
5 TABLET ORAL EVERY 4 HOURS PRN
Status: DISCONTINUED | OUTPATIENT
Start: 2024-04-15 | End: 2024-04-16 | Stop reason: HOSPADM

## 2024-04-15 RX ORDER — ONDANSETRON 4 MG/1
4 TABLET, FILM COATED ORAL EVERY 8 HOURS PRN
Status: DISCONTINUED | OUTPATIENT
Start: 2024-04-15 | End: 2024-04-16 | Stop reason: HOSPADM

## 2024-04-15 RX ORDER — DEXTROSE 50 % IN WATER (D50W) INTRAVENOUS SYRINGE
12.5
Status: DISCONTINUED | OUTPATIENT
Start: 2024-04-15 | End: 2024-04-16 | Stop reason: HOSPADM

## 2024-04-15 RX ORDER — FLUOXETINE HYDROCHLORIDE 20 MG/1
20 CAPSULE ORAL DAILY
Status: DISCONTINUED | OUTPATIENT
Start: 2024-04-15 | End: 2024-04-16 | Stop reason: HOSPADM

## 2024-04-15 RX ADMIN — ROCURONIUM BROMIDE 10 MG: 10 INJECTION, SOLUTION INTRAVENOUS at 11:13

## 2024-04-15 RX ADMIN — ROCURONIUM BROMIDE 10 MG: 10 INJECTION, SOLUTION INTRAVENOUS at 10:07

## 2024-04-15 RX ADMIN — MIDAZOLAM 2 MG: 1 INJECTION INTRAMUSCULAR; INTRAVENOUS at 07:30

## 2024-04-15 RX ADMIN — PROPOFOL 20 MG: 10 INJECTION, EMULSION INTRAVENOUS at 10:29

## 2024-04-15 RX ADMIN — CEFAZOLIN SODIUM 2 G: 2 INJECTION, SOLUTION INTRAVENOUS at 23:27

## 2024-04-15 RX ADMIN — ROCURONIUM BROMIDE 10 MG: 10 INJECTION, SOLUTION INTRAVENOUS at 09:50

## 2024-04-15 RX ADMIN — ROCURONIUM BROMIDE 10 MG: 10 INJECTION, SOLUTION INTRAVENOUS at 09:19

## 2024-04-15 RX ADMIN — SODIUM CHLORIDE, SODIUM LACTATE, POTASSIUM CHLORIDE, AND CALCIUM CHLORIDE 100 ML/HR: 600; 310; 30; 20 INJECTION, SOLUTION INTRAVENOUS at 07:06

## 2024-04-15 RX ADMIN — EPHEDRINE SULFATE 10 MG: 50 INJECTION, SOLUTION INTRAVENOUS at 08:28

## 2024-04-15 RX ADMIN — REMIFENTANIL HYDROCHLORIDE 0.05 MCG/KG/MIN: 1 INJECTION, POWDER, LYOPHILIZED, FOR SOLUTION INTRAVENOUS at 07:54

## 2024-04-15 RX ADMIN — ACETAMINOPHEN 975 MG: 325 TABLET ORAL at 23:26

## 2024-04-15 RX ADMIN — FENTANYL CITRATE 50 MCG: 50 INJECTION, SOLUTION INTRAMUSCULAR; INTRAVENOUS at 09:52

## 2024-04-15 RX ADMIN — SODIUM CHLORIDE, POTASSIUM CHLORIDE, SODIUM LACTATE AND CALCIUM CHLORIDE 100 ML/HR: 600; 310; 30; 20 INJECTION, SOLUTION INTRAVENOUS at 14:08

## 2024-04-15 RX ADMIN — PROPOFOL 30 MG: 10 INJECTION, EMULSION INTRAVENOUS at 11:06

## 2024-04-15 RX ADMIN — HYDROMORPHONE HYDROCHLORIDE 0.5 MG: 2 INJECTION, SOLUTION INTRAMUSCULAR; INTRAVENOUS; SUBCUTANEOUS at 13:00

## 2024-04-15 RX ADMIN — EPHEDRINE SULFATE 10 MG: 50 INJECTION, SOLUTION INTRAVENOUS at 08:18

## 2024-04-15 RX ADMIN — DOCUSATE SODIUM 100 MG: 100 CAPSULE, LIQUID FILLED ORAL at 21:01

## 2024-04-15 RX ADMIN — PHENYLEPHRINE HYDROCHLORIDE 100 MCG: 10 INJECTION INTRAVENOUS at 08:20

## 2024-04-15 RX ADMIN — PHENYLEPHRINE HYDROCHLORIDE 200 MCG: 10 INJECTION INTRAVENOUS at 07:51

## 2024-04-15 RX ADMIN — DEXAMETHASONE SODIUM PHOSPHATE 8 MG: 4 INJECTION, SOLUTION INTRAMUSCULAR; INTRAVENOUS at 07:47

## 2024-04-15 RX ADMIN — PROPOFOL 20 MG: 10 INJECTION, EMULSION INTRAVENOUS at 08:47

## 2024-04-15 RX ADMIN — Medication 0.2 MCG/KG/MIN: at 08:40

## 2024-04-15 RX ADMIN — SUGAMMADEX 200 MG: 100 INJECTION, SOLUTION INTRAVENOUS at 12:46

## 2024-04-15 RX ADMIN — SODIUM CHLORIDE, SODIUM LACTATE, POTASSIUM CHLORIDE, AND CALCIUM CHLORIDE: 600; 310; 30; 20 INJECTION, SOLUTION INTRAVENOUS at 11:36

## 2024-04-15 RX ADMIN — ACETAMINOPHEN 975 MG: 325 TABLET ORAL at 15:56

## 2024-04-15 RX ADMIN — PHENYLEPHRINE HYDROCHLORIDE 100 MCG: 10 INJECTION INTRAVENOUS at 08:09

## 2024-04-15 RX ADMIN — HYDROMORPHONE HYDROCHLORIDE 0.5 MG: 2 INJECTION, SOLUTION INTRAMUSCULAR; INTRAVENOUS; SUBCUTANEOUS at 13:09

## 2024-04-15 RX ADMIN — LIDOCAINE HYDROCHLORIDE 100 MG: 20 INJECTION INTRAVENOUS at 07:39

## 2024-04-15 RX ADMIN — PHENYLEPHRINE HYDROCHLORIDE 100 MCG: 10 INJECTION INTRAVENOUS at 08:00

## 2024-04-15 RX ADMIN — SODIUM CHLORIDE, POTASSIUM CHLORIDE, SODIUM LACTATE AND CALCIUM CHLORIDE 100 ML/HR: 600; 310; 30; 20 INJECTION, SOLUTION INTRAVENOUS at 21:02

## 2024-04-15 RX ADMIN — ROCURONIUM BROMIDE 10 MG: 10 INJECTION, SOLUTION INTRAVENOUS at 10:39

## 2024-04-15 RX ADMIN — ROCURONIUM BROMIDE 10 MG: 10 INJECTION, SOLUTION INTRAVENOUS at 10:15

## 2024-04-15 RX ADMIN — ROCURONIUM BROMIDE 10 MG: 10 INJECTION, SOLUTION INTRAVENOUS at 11:51

## 2024-04-15 RX ADMIN — CEFAZOLIN 2 G: 1 INJECTION, POWDER, FOR SOLUTION INTRAMUSCULAR; INTRAVENOUS at 07:47

## 2024-04-15 RX ADMIN — SODIUM CHLORIDE, SODIUM LACTATE, POTASSIUM CHLORIDE, AND CALCIUM CHLORIDE: 600; 310; 30; 20 INJECTION, SOLUTION INTRAVENOUS at 10:10

## 2024-04-15 RX ADMIN — ROCURONIUM BROMIDE 50 MG: 10 INJECTION, SOLUTION INTRAVENOUS at 07:39

## 2024-04-15 RX ADMIN — EPHEDRINE SULFATE 5 MG: 50 INJECTION, SOLUTION INTRAVENOUS at 10:08

## 2024-04-15 RX ADMIN — SODIUM CHLORIDE, SODIUM LACTATE, POTASSIUM CHLORIDE, AND CALCIUM CHLORIDE: 600; 310; 30; 20 INJECTION, SOLUTION INTRAVENOUS at 07:47

## 2024-04-15 RX ADMIN — FENTANYL CITRATE 50 MCG: 50 INJECTION, SOLUTION INTRAMUSCULAR; INTRAVENOUS at 07:39

## 2024-04-15 RX ADMIN — ONDANSETRON 4 MG: 2 INJECTION INTRAMUSCULAR; INTRAVENOUS at 12:46

## 2024-04-15 RX ADMIN — ROCURONIUM BROMIDE 10 MG: 10 INJECTION, SOLUTION INTRAVENOUS at 10:28

## 2024-04-15 RX ADMIN — ROCURONIUM BROMIDE 20 MG: 10 INJECTION, SOLUTION INTRAVENOUS at 08:13

## 2024-04-15 RX ADMIN — DIAZEPAM 2.5 MG: 10 INJECTION, SOLUTION INTRAMUSCULAR; INTRAVENOUS at 14:42

## 2024-04-15 RX ADMIN — ROCURONIUM BROMIDE 10 MG: 10 INJECTION, SOLUTION INTRAVENOUS at 09:35

## 2024-04-15 RX ADMIN — PROPOFOL 150 MG: 10 INJECTION, EMULSION INTRAVENOUS at 07:39

## 2024-04-15 RX ADMIN — MIRTAZAPINE 15 MG: 15 TABLET, FILM COATED ORAL at 21:01

## 2024-04-15 RX ADMIN — PROPOFOL 30 MG: 10 INJECTION, EMULSION INTRAVENOUS at 08:33

## 2024-04-15 RX ADMIN — PROPOFOL 30 MG: 10 INJECTION, EMULSION INTRAVENOUS at 10:41

## 2024-04-15 RX ADMIN — ROCURONIUM BROMIDE 20 MG: 10 INJECTION, SOLUTION INTRAVENOUS at 08:50

## 2024-04-15 RX ADMIN — ROCURONIUM BROMIDE 10 MG: 10 INJECTION, SOLUTION INTRAVENOUS at 08:59

## 2024-04-15 RX ADMIN — CEFAZOLIN 2 G: 1 INJECTION, POWDER, FOR SOLUTION INTRAMUSCULAR; INTRAVENOUS at 11:47

## 2024-04-15 RX ADMIN — POVIDONE-IODINE 1 APPLICATION: 5 SOLUTION TOPICAL at 07:08

## 2024-04-15 RX ADMIN — ATORVASTATIN CALCIUM 20 MG: 20 TABLET, FILM COATED ORAL at 21:01

## 2024-04-15 RX ADMIN — PROPOFOL 20 MG: 10 INJECTION, EMULSION INTRAVENOUS at 11:51

## 2024-04-15 RX ADMIN — ROCURONIUM BROMIDE 10 MG: 10 INJECTION, SOLUTION INTRAVENOUS at 11:01

## 2024-04-15 RX ADMIN — HYDROMORPHONE HYDROCHLORIDE 0.5 MG: 2 INJECTION, SOLUTION INTRAMUSCULAR; INTRAVENOUS; SUBCUTANEOUS at 12:46

## 2024-04-15 RX ADMIN — EPHEDRINE SULFATE 10 MG: 50 INJECTION, SOLUTION INTRAVENOUS at 09:35

## 2024-04-15 SDOH — SOCIAL STABILITY: SOCIAL INSECURITY: ARE THERE ANY APPARENT SIGNS OF INJURIES/BEHAVIORS THAT COULD BE RELATED TO ABUSE/NEGLECT?: NO

## 2024-04-15 SDOH — HEALTH STABILITY: MENTAL HEALTH: CURRENT SMOKER: 0

## 2024-04-15 SDOH — SOCIAL STABILITY: SOCIAL INSECURITY: ARE YOU OR HAVE YOU BEEN THREATENED OR ABUSED PHYSICALLY, EMOTIONALLY, OR SEXUALLY BY ANYONE?: NO

## 2024-04-15 SDOH — SOCIAL STABILITY: SOCIAL INSECURITY: DO YOU FEEL ANYONE HAS EXPLOITED OR TAKEN ADVANTAGE OF YOU FINANCIALLY OR OF YOUR PERSONAL PROPERTY?: NO

## 2024-04-15 SDOH — SOCIAL STABILITY: SOCIAL INSECURITY: WERE YOU ABLE TO COMPLETE ALL THE BEHAVIORAL HEALTH SCREENINGS?: YES

## 2024-04-15 SDOH — SOCIAL STABILITY: SOCIAL INSECURITY: DOES ANYONE TRY TO KEEP YOU FROM HAVING/CONTACTING OTHER FRIENDS OR DOING THINGS OUTSIDE YOUR HOME?: NO

## 2024-04-15 SDOH — SOCIAL STABILITY: SOCIAL INSECURITY: ABUSE: ADULT

## 2024-04-15 SDOH — SOCIAL STABILITY: SOCIAL INSECURITY: DO YOU FEEL UNSAFE GOING BACK TO THE PLACE WHERE YOU ARE LIVING?: NO

## 2024-04-15 SDOH — SOCIAL STABILITY: SOCIAL INSECURITY: HAVE YOU HAD THOUGHTS OF HARMING ANYONE ELSE?: NO

## 2024-04-15 SDOH — SOCIAL STABILITY: SOCIAL INSECURITY: HAS ANYONE EVER THREATENED TO HURT YOUR FAMILY OR YOUR PETS?: NO

## 2024-04-15 ASSESSMENT — PAIN - FUNCTIONAL ASSESSMENT
PAIN_FUNCTIONAL_ASSESSMENT: 0-10

## 2024-04-15 ASSESSMENT — COGNITIVE AND FUNCTIONAL STATUS - GENERAL
MOVING FROM LYING ON BACK TO SITTING ON SIDE OF FLAT BED WITH BEDRAILS: A LITTLE
MOVING TO AND FROM BED TO CHAIR: A LITTLE
TURNING FROM BACK TO SIDE WHILE IN FLAT BAD: A LITTLE
PATIENT BASELINE BEDBOUND: NO
STANDING UP FROM CHAIR USING ARMS: A LITTLE
WALKING IN HOSPITAL ROOM: A LITTLE
MOBILITY SCORE: 18
CLIMB 3 TO 5 STEPS WITH RAILING: A LITTLE
STANDING UP FROM CHAIR USING ARMS: A LITTLE
DRESSING REGULAR LOWER BODY CLOTHING: A LITTLE
MOVING TO AND FROM BED TO CHAIR: A LITTLE
TURNING FROM BACK TO SIDE WHILE IN FLAT BAD: A LITTLE
CLIMB 3 TO 5 STEPS WITH RAILING: A LITTLE
MOVING FROM LYING ON BACK TO SITTING ON SIDE OF FLAT BED WITH BEDRAILS: A LITTLE
TOILETING: A LITTLE
PERSONAL GROOMING: A LITTLE
DRESSING REGULAR UPPER BODY CLOTHING: A LITTLE
MOBILITY SCORE: 18
WALKING IN HOSPITAL ROOM: A LITTLE
DAILY ACTIVITIY SCORE: 19
HELP NEEDED FOR BATHING: A LITTLE

## 2024-04-15 ASSESSMENT — LIFESTYLE VARIABLES
HOW MANY STANDARD DRINKS CONTAINING ALCOHOL DO YOU HAVE ON A TYPICAL DAY: PATIENT DOES NOT DRINK
AUDIT-C TOTAL SCORE: 0
SUBSTANCE_ABUSE_PAST_12_MONTHS: NO
AUDIT-C TOTAL SCORE: 0
HOW OFTEN DO YOU HAVE 6 OR MORE DRINKS ON ONE OCCASION: NEVER
PRESCIPTION_ABUSE_PAST_12_MONTHS: NO
HOW OFTEN DO YOU HAVE A DRINK CONTAINING ALCOHOL: NEVER
SKIP TO QUESTIONS 9-10: 1

## 2024-04-15 ASSESSMENT — ACTIVITIES OF DAILY LIVING (ADL)
FEEDING YOURSELF: NEEDS ASSISTANCE
GROOMING: NEEDS ASSISTANCE
BATHING: NEEDS ASSISTANCE
DRESSING YOURSELF: NEEDS ASSISTANCE
HEARING - LEFT EAR: FUNCTIONAL
JUDGMENT_ADEQUATE_SAFELY_COMPLETE_DAILY_ACTIVITIES: YES
LACK_OF_TRANSPORTATION: NO
ADEQUATE_TO_COMPLETE_ADL: YES
PATIENT'S MEMORY ADEQUATE TO SAFELY COMPLETE DAILY ACTIVITIES?: YES
TOILETING: NEEDS ASSISTANCE
WALKS IN HOME: NEEDS ASSISTANCE
HEARING - RIGHT EAR: FUNCTIONAL

## 2024-04-15 ASSESSMENT — PAIN SCALES - GENERAL
PAINLEVEL_OUTOF10: 0 - NO PAIN
PAIN_LEVEL: 0
PAINLEVEL_OUTOF10: 0 - NO PAIN

## 2024-04-15 ASSESSMENT — COLUMBIA-SUICIDE SEVERITY RATING SCALE - C-SSRS
2. HAVE YOU ACTUALLY HAD ANY THOUGHTS OF KILLING YOURSELF?: NO
1. IN THE PAST MONTH, HAVE YOU WISHED YOU WERE DEAD OR WISHED YOU COULD GO TO SLEEP AND NOT WAKE UP?: NO
6. HAVE YOU EVER DONE ANYTHING, STARTED TO DO ANYTHING, OR PREPARED TO DO ANYTHING TO END YOUR LIFE?: NO

## 2024-04-15 NOTE — ANESTHESIA POSTPROCEDURE EVALUATION
Patient: Misael Lopez    Procedure Summary       Date: 04/15/24 Room / Location: [facility] OR 08 / Virtual [facility] OR    Anesthesia Start: 0729 Anesthesia Stop: 1309    Procedure: C3-4, C4-5, C5-6, C6-7 ANTERIOR DISC EXCISIONS/ ALLOGRAFT FUSION & PLATE FIXATION C3-7 WITH FLAT PLATE CERVICAL X-RAY (Neck) Diagnosis:       Cervical spondylosis with myelopathy      Myelomalacia of cervical cord (Multi)      (Cervical spondylosis with myelopathy [M47.12])      (Myelomalacia of cervical cord (CMS/HCC) [G95.89])    Surgeons: Davonte Mauricio MD Responsible Provider: Francisco Michel MD    Anesthesia Type: general ASA Status: 3            Anesthesia Type: general    Vitals Value Taken Time   /85 04/15/24 1308   Temp 36 °C (96.8 °F) 04/15/24 1308   Pulse 94 04/15/24 1308   Resp 16 04/15/24 1308   SpO2 100 % 04/15/24 1308       Anesthesia Post Evaluation    Patient location during evaluation: PACU  Patient participation: complete - patient participated  Level of consciousness: awake and alert  Pain score: 0  Pain management: adequate  Airway patency: patent  Cardiovascular status: acceptable  Respiratory status: acceptable  Hydration status: acceptable  Postoperative Nausea and Vomiting: none        There were no known notable events for this encounter.

## 2024-04-15 NOTE — CARE PLAN
Problem: Pain - Adult  Goal: Verbalizes/displays adequate comfort level or baseline comfort level  Outcome: Progressing

## 2024-04-15 NOTE — CARE PLAN
The patient's goals for the shift include Pain control    The clinical goals for the shift include Pain control    Problem: Pain - Adult  Goal: Verbalizes/displays adequate comfort level or baseline comfort level  Outcome: Progressing     Problem: Safety - Adult  Goal: Free from fall injury  Outcome: Progressing     Problem: Discharge Planning  Goal: Discharge to home or other facility with appropriate resources  Outcome: Progressing     Problem: Chronic Conditions and Co-morbidities  Goal: Patient's chronic conditions and co-morbidity symptoms are monitored and maintained or improved  Outcome: Progressing     Problem: Pain  Goal: Takes deep breaths with improved pain control throughout the shift  Outcome: Progressing  Goal: Turns in bed with improved pain control throughout the shift  Outcome: Progressing  Goal: Walks with improved pain control throughout the shift  Outcome: Progressing  Goal: Performs ADL's with improved pain control throughout shift  Outcome: Progressing  Goal: Participates in PT with improved pain control throughout the shift  Outcome: Progressing  Goal: Free from opioid side effects throughout the shift  Outcome: Progressing  Goal: Free from acute confusion related to pain meds throughout the shift  Outcome: Progressing

## 2024-04-15 NOTE — ANESTHESIA PROCEDURE NOTES
Arterial Line:    Date/Time: 4/15/2024 7:45 AM    Staffing  Performed: attending   Authorized by: Francisco Michel MD    Performed by: MAE Garcia    An arterial line was placed. Procedure performed using surface landmarks.in the OR for the following indication(s): continuous blood pressure monitoring.    A 20 gauge (size) (length), Arrow (type) catheter was placed into the Left radial artery, secured by Tegaderm,   Seldinger technique not used.  Events:  patient tolerated procedure well with no complications.      Additional notes:  Sterile technique used throughout. Easy placement without complication.

## 2024-04-15 NOTE — ANESTHESIA PROCEDURE NOTES
Airway  Date/Time: 4/15/2024 7:41 AM  Urgency: elective    Airway not difficult    Staffing  Performed: MAE   Authorized by: Francisco Michel MD    Performed by: MAE Garcia  Patient location during procedure: OR    Indications and Patient Condition  Indications for airway management: anesthesia and airway protection  Spontaneous Ventilation: absent  Sedation level: deep  Preoxygenated: yes  Patient position: sniffing  MILS maintained throughout  Mask difficulty assessment: 1 - vent by mask  Planned trial extubation    Final Airway Details  Final airway type: endotracheal airway      Successful airway: ETT     Successful intubation technique: video laryngoscopy  Facilitating devices/methods: intubating stylet  Endotracheal tube insertion site: oral  Blade: Abisai  Blade size: #4  ETT size (mm): 7.5  Cormack-Lehane Classification: grade I - full view of glottis  Placement verified by: capnometry   Measured from: gums  ETT to gums (cm): 22  Number of attempts at approach: 1  Number of other approaches attempted: 0    Additional Comments  Easy BMV, Easy intubation with elective Glidescope size 4 blade. Lips and teeth in preanesthetic condition

## 2024-04-15 NOTE — OP NOTE
Pre-operative Diagnosis:Pre-Op Diagnosis Codes:     * Cervical spondylosis with myelopathy [M47.12]     * Myelomalacia of cervical cord (Multi) [G95.89]     Post-operative Diagnosis: Post-op Diagnosis     * Cervical spondylosis with myelopathy [M47.12]     * Myelomalacia of cervical cord (Multi) [G95.89]     Surgeon:    * Davonte Mauricio - Primary     Anesthesia staff:Anesthesiologist: Francisco Michel MD  C-AA: MAE Garcia    Procedure: Procedure(s):  C3-4, C4-5, C5-6, C6-7 ANTERIOR DISC EXCISIONS/ ALLOGRAFT FUSION & PLATE FIXATION C3-7 WITH FLAT PLATE CERVICAL X-RAY    Findings: The 3 bottom levels were significantly sclerotic and required extensive drilling to open up the canal and the foramina bilaterally.  The top disc was much more mobile but also took a 7 mm allograft.  The final x-ray showed alignment to be acceptable.    Estimated Blood Loss: 50 cc    Drains: Retropharyngeal and Baron catheter    Specimen:No specimens collected     Implants:   Implant Name Type Inv. Item Serial No.  Lot No. LRB No. Used Action   ALLOGRAFT, TRIAD LORDOTIC 7 X 11 X 14 - [id number] - [id number] Spinal Hardware ALLOGRAFT, TRIAD LORDOTIC 7 X 11 X 14 [id number] [employer]  N/A 1 Implanted   ALLOGRAFT, TRIAD LORDOTIC 7 X 11 X 14 - [id number] - [id number] Spinal Hardware ALLOGRAFT, TRIAD LORDOTIC 7 X 11 X 14 [id number] [employer]  N/A 1 Implanted   ALLOGRAFT, TRIAD LORDOTIC 7 X 11 X 14 - [id number] - [id number] Spinal Hardware ALLOGRAFT, TRIAD LORDOTIC 7 X 11 X 14 [id number] [employer]  N/A 1 Implanted   ALLOGRAFT, TRIAD LORDOTIC 7 X 11 X 14 - [id number] - [id number] Spinal Hardware ALLOGRAFT, TRIAD LORDOTIC 7 X 11 X 14 [id number] [employer]  N/A 1 Implanted   SCREW, ACP, SELF DRILL, 3.5 X 17MM, VARIABLE - [id number] Spinal Hardware SCREW, ACP, SELF DRILL, 3.5 X 17MM, VARIABLE  [employer]  N/A 10 Implanted   74 mm 4 level plate    [employer]  N/A 1 Implanted        Procedure Detail:  The  patient was brought into the operating room.  A timeout huddle was performed.  General endotracheal tube anesthesia was administered using the glide scope intubation system.  Baseline evoked potentials were obtained.  Once the evoked potentials were obtained then the shoulder roll was placed shoulders and her head was placed in a beanbag head heard.  The arms were tucked by her side.  An x-ray was taken to verify proper location for our skin incision.  Then the area was clipped prepped and draped in the usual sterile fashion.  The skin was infiltrated with local anesthetic.  The skin was incised with a #15 blade.  Hemostasis was obtained with the bipolar electrocautery.  I undermined the skin superficial to the platysma in a rostral and caudal direction.  I then incised the platysma along the long axis of its fibers in a rostral and caudal direction.  I then dissected along the anterior border the sternocleidomastoid and over the top of the omohyoid muscle.  Once I could palpate the carotid pulse I swept underneath the esophagus and trachea using blunt dissection.  Then I freed up the connective tissue and found the appropriate disc spaces.  I took an x-ray to confirm the proper levels.      Once we had confirmation of the proper levels I remove the anterior osteophytes with the Leksell rongeur and we saved the bone and cleaned of any connective tissue for the allograft graft and autograft fusion at the end of the procedure.  I then placed a pin in the body of C7 to the pin in the body of C6 and applied some distraction across the disc space after it had been incised with a 15 blade.  I then cleaned out any of the disc material by scraping the osteophytes and the cartilaginous endplates from 1 uncinate process to the other.  I then drilled the endplates parallel.  I also drilled up into the foramina bilaterally.  I then was able to get underneath the posterior longitudinal ligament with a 4 oh up-angled curette and  then I removed the remaining osteophyte and ligament with a 2 and 1 mm Kerrison punch.  I then measured the depth and measured the height of the disc base and chose the appropriate size graft.  At this level I used an 7 mm graft.  This was tapped into position.  The distraction was released and the bone plug fit snugly.  On I then remove the pin from the body of C7 and replaced it into C5.  Now I applied distraction across the C5-6 space after the annulus was cut with a 15 blade.  I then remove the disc in the same manner we curetted out the cartilaginous endplates from 1 uncinate process to the other I then drilled the endplates parallel and drilled off the posterior osteophytes the drill mellowing out into the foramina bilaterally.  Then was able to get underneath the posterior longitudinal ligament with a 4 oh up-angled curette and remove the remaining ligament and osteophyte with the 2 and 1 mm Kerrison punches.  I then measured for the appropriate size allograft.  At this level it was a 7 mm graft.  Once it was tapped into position I then remove the pin from C6 and replaced it into the body of C4.  I then remove the C4-5 disc in much the same manner.  First distraction was applied after the annulus had been cut with a 15 blade.  The disc contents were curetted away with the 2 oh up angled hook curette from 1 uncinate process to the other.  I then drilled the endplates parallel and drilled off the posterior osteophytes out into the foramina bilaterally.  I got underneath the posterior longitudinal ligament with a 4 oh up-angled curette.  I removed the remaining osteophyte and ligament with a 2 and 1 mm Kerrison punch.  I then measured for the appropriate size allograft.  At this level I used a 7 mm allograft.  Once this was tapped into position I then removed the pin from C5 and replaced it into the body of C3.  The C3-4 level was cleaned out after the distraction was applied and the annulus was cut with a 15  blade.  Curetted out the contents in the same manner scraping the endplates down to the bony surface from 1 uncinate process to the other.  I drilled off the posterior osteophytes and got underneath the posterior longitudinal ligament with a hook curette and remove the remaining ligament and osteophyte with a 1 and 2 mm Kerrison punch.  Measured for the appropriate sized graft and tapped it into position this was a 7 mm graft as well.  I chose the appropriate size plate which in this case was an 74 mm plate and lined it up appropriately.  I used the awl to place  holes in the 5 vertebral bodies 1 on each side.  I used 17 mm screws in all ten holes.  I also placed the additional bone graft that had been removed from the anterior osteophytes between the allograft and the plate.  And I cinched down all the screws and put my locking mechanism in place.  I then allowed the esophagus and trachea to fall back into their normal position.  The drain was exited through a separate stab site.  It was sewn in with a 3-0 Prolene.  I irrigated with copious amounts of irrigation and then I approximated the platysma with a few interrupted 3-0 Vicryl sutures I used a few inverted interrupted 3-0 Vicryl sutures in the subcutaneous issue.  I then closed the skin with a subcuticular running stitch of 4-0 Vicryl and reinforced with glue.  The patient was stable and tolerated the procedure well evoked potential monitoring were at the baseline at the end of the operation.        Complication: None     Davonte Mauricio MD

## 2024-04-15 NOTE — ANESTHESIA PREPROCEDURE EVALUATION
Patient: Misael Lopez    Procedure Information       Date/Time: 04/15/24 0730    Procedure: C3-4, C4-5, C5-6, C6-7 ANTERIOR DISC EXCISIONS/ ALLOGRAFT FUSION & PLATE FIXATION C3-7 WITH FLAT PLATE CERVICAL X-RAY - All CPT codes for this surgery:  28486  22552x3  09918 ([employer] ACP Plate)  05289 ([employer] Triad allograft bone)    Location: [facility] / [facility]    Surgeons: Davonte Mauricio MD            Relevant Problems   Anesthesia (within normal limits)      Cardiac   (+) Angina pectoris (CMS-HCC)   (+) Coronary artery disease   (+) Heart block   (+) Heart murmur   (+) Hyperlipidemia   (+) Hypertension, benign   (+) Myocardial infarct (Multi)   (+) PVC's (premature ventricular contractions)      Pulmonary   (+) Obstructive sleep apnea syndrome      Neuro   (+) Depression   (+) Severe episode of recurrent major depressive disorder, without psychotic features (Multi)      GI   (+) Gastroesophageal reflux disease without esophagitis      /Renal   (+) Benign prostatic hyperplasia   (+) Calculus of kidney      Endocrine   (+) Obesity, unspecified      Musculoskeletal   (+) Cervical spondylosis with myelopathy   (+) Degeneration of lumbar or lumbosacral intervertebral disc   (+) Lumbar stenosis with neurogenic claudication   (+) Lumbosacral spondylosis   (+) Osteoarthritis of carpometacarpal (CMC) joint of left thumb   (+) Primary osteoarthritis of left knee       Clinical information reviewed:   Tobacco  Allergies  Meds   Med Hx  Surg Hx   Fam Hx  Soc Hx        NPO Detail:  NPO/Void Status  Carbohydrate Drink Given Prior to Surgery? : Y  Date of Last Liquid: 04/14/24  Time of Last Liquid: 0400  Date of Last Solid: 04/14/24  Time of Last Solid: 1800  Last Intake Type: Food  Time of Last Void: 0646         Physical Exam    Airway  Mallampati: II     Cardiovascular   Rhythm: regular     Dental    Pulmonary    Abdominal        Anesthesia Plan    History of general anesthesia?: yes  History of complications  of general anesthesia?: no    ASA 3     general   ([place]  Pt with Hx. of difficult intubation in 2009.  Multiple subsequent surgeries without problem)  The patient is not a current smoker.  Patient was not previously instructed to abstain from smoking on day of procedure.  Patient did not smoke on day of procedure.    intravenous induction   Anesthetic plan and risks discussed with patient.  Use of blood products discussed with patient who.    Plan discussed with CAA.

## 2024-04-16 ENCOUNTER — APPOINTMENT (OUTPATIENT)
Dept: CARDIOLOGY | Facility: CLINIC | Age: 74
End: 2024-04-16
Payer: MEDICARE

## 2024-04-16 ENCOUNTER — APPOINTMENT (OUTPATIENT)
Dept: RADIOLOGY | Facility: HOSPITAL | Age: 74
DRG: 472 | End: 2024-04-16
Payer: MEDICARE

## 2024-04-16 VITALS
BODY MASS INDEX: 33.56 KG/M2 | DIASTOLIC BLOOD PRESSURE: 81 MMHG | RESPIRATION RATE: 18 BRPM | SYSTOLIC BLOOD PRESSURE: 137 MMHG | HEART RATE: 93 BPM | HEIGHT: 67 IN | TEMPERATURE: 97.3 F | OXYGEN SATURATION: 94 % | WEIGHT: 213.85 LBS

## 2024-04-16 LAB
ANION GAP SERPL CALC-SCNC: 14 MMOL/L (ref 10–20)
BUN SERPL-MCNC: 13 MG/DL (ref 6–23)
CALCIUM SERPL-MCNC: 9.4 MG/DL (ref 8.6–10.3)
CHLORIDE SERPL-SCNC: 103 MMOL/L (ref 98–107)
CO2 SERPL-SCNC: 25 MMOL/L (ref 21–32)
CREAT SERPL-MCNC: 0.78 MG/DL (ref 0.5–1.3)
EGFRCR SERPLBLD CKD-EPI 2021: >90 ML/MIN/1.73M*2
ERYTHROCYTE [DISTWIDTH] IN BLOOD BY AUTOMATED COUNT: 12.4 % (ref 11.5–14.5)
GLUCOSE SERPL-MCNC: 130 MG/DL (ref 74–99)
HCT VFR BLD AUTO: 40.4 % (ref 41–52)
HGB BLD-MCNC: 13.8 G/DL (ref 13.5–17.5)
MCH RBC QN AUTO: 33.5 PG (ref 26–34)
MCHC RBC AUTO-ENTMCNC: 34.2 G/DL (ref 32–36)
MCV RBC AUTO: 98 FL (ref 80–100)
NRBC BLD-RTO: 0 /100 WBCS (ref 0–0)
PLATELET # BLD AUTO: 197 X10*3/UL (ref 150–450)
POTASSIUM SERPL-SCNC: 4.4 MMOL/L (ref 3.5–5.3)
RBC # BLD AUTO: 4.12 X10*6/UL (ref 4.5–5.9)
SODIUM SERPL-SCNC: 138 MMOL/L (ref 136–145)
WBC # BLD AUTO: 11.6 X10*3/UL (ref 4.4–11.3)

## 2024-04-16 PROCEDURE — 99024 POSTOP FOLLOW-UP VISIT: CPT | Performed by: NURSE PRACTITIONER

## 2024-04-16 PROCEDURE — 97161 PT EVAL LOW COMPLEX 20 MIN: CPT | Mod: GP

## 2024-04-16 PROCEDURE — 80048 BASIC METABOLIC PNL TOTAL CA: CPT | Performed by: NEUROLOGICAL SURGERY

## 2024-04-16 PROCEDURE — 2500000002 HC RX 250 W HCPCS SELF ADMINISTERED DRUGS (ALT 637 FOR MEDICARE OP, ALT 636 FOR OP/ED): Mod: MUE | Performed by: NEUROLOGICAL SURGERY

## 2024-04-16 PROCEDURE — 97168 OT RE-EVAL EST PLAN CARE: CPT | Mod: GO

## 2024-04-16 PROCEDURE — 36415 COLL VENOUS BLD VENIPUNCTURE: CPT | Performed by: NEUROLOGICAL SURGERY

## 2024-04-16 PROCEDURE — 2500000001 HC RX 250 WO HCPCS SELF ADMINISTERED DRUGS (ALT 637 FOR MEDICARE OP): Performed by: NEUROLOGICAL SURGERY

## 2024-04-16 PROCEDURE — 2500000002 HC RX 250 W HCPCS SELF ADMINISTERED DRUGS (ALT 637 FOR MEDICARE OP, ALT 636 FOR OP/ED): Performed by: NEUROLOGICAL SURGERY

## 2024-04-16 PROCEDURE — 97165 OT EVAL LOW COMPLEX 30 MIN: CPT | Mod: GO

## 2024-04-16 PROCEDURE — 72040 X-RAY EXAM NECK SPINE 2-3 VW: CPT

## 2024-04-16 PROCEDURE — 85027 COMPLETE CBC AUTOMATED: CPT | Performed by: NEUROLOGICAL SURGERY

## 2024-04-16 RX ORDER — ACETAMINOPHEN 325 MG/1
975 TABLET ORAL EVERY 8 HOURS PRN
Start: 2024-04-16

## 2024-04-16 RX ORDER — DOCUSATE SODIUM 100 MG/1
100 CAPSULE, LIQUID FILLED ORAL 2 TIMES DAILY PRN
Start: 2024-04-16

## 2024-04-16 RX ORDER — OXYCODONE HYDROCHLORIDE 5 MG/1
5 TABLET ORAL EVERY 6 HOURS PRN
Qty: 28 TABLET | Refills: 0 | Status: SHIPPED | OUTPATIENT
Start: 2024-04-16 | End: 2024-05-13 | Stop reason: WASHOUT

## 2024-04-16 RX ORDER — ASPIRIN 81 MG/1
81 TABLET ORAL ONCE
Start: 2024-04-16 | End: 2024-04-16

## 2024-04-16 RX ORDER — CYCLOBENZAPRINE HCL 10 MG
10 TABLET ORAL 3 TIMES DAILY PRN
Qty: 30 TABLET | Refills: 1 | Status: SHIPPED | OUTPATIENT
Start: 2024-04-16

## 2024-04-16 RX ADMIN — FLUOXETINE 20 MG: 20 CAPSULE ORAL at 08:03

## 2024-04-16 RX ADMIN — ACETAMINOPHEN 975 MG: 325 TABLET ORAL at 07:00

## 2024-04-16 RX ADMIN — OXYCODONE HYDROCHLORIDE 5 MG: 5 TABLET ORAL at 06:59

## 2024-04-16 RX ADMIN — BROMOCRIPTINE MESYLATE 5 MG: 2.5 TABLET ORAL at 08:02

## 2024-04-16 RX ADMIN — TAMSULOSIN HYDROCHLORIDE 0.4 MG: 0.4 CAPSULE ORAL at 08:03

## 2024-04-16 RX ADMIN — DOCUSATE SODIUM 100 MG: 100 CAPSULE, LIQUID FILLED ORAL at 08:03

## 2024-04-16 RX ADMIN — RANOLAZINE 500 MG: 500 TABLET, EXTENDED RELEASE ORAL at 08:02

## 2024-04-16 RX ADMIN — ATORVASTATIN CALCIUM 20 MG: 20 TABLET, FILM COATED ORAL at 08:02

## 2024-04-16 RX ADMIN — CYCLOBENZAPRINE 10 MG: 10 TABLET, FILM COATED ORAL at 08:02

## 2024-04-16 ASSESSMENT — COGNITIVE AND FUNCTIONAL STATUS - GENERAL
EATING MEALS: A LITTLE
DRESSING REGULAR LOWER BODY CLOTHING: A LITTLE
WALKING IN HOSPITAL ROOM: A LITTLE
HELP NEEDED FOR BATHING: A LITTLE
TOILETING: A LITTLE
DRESSING REGULAR UPPER BODY CLOTHING: A LITTLE
MOBILITY SCORE: 23
CLIMB 3 TO 5 STEPS WITH RAILING: A LITTLE
STANDING UP FROM CHAIR USING ARMS: A LITTLE
MOVING TO AND FROM BED TO CHAIR: A LITTLE
TURNING FROM BACK TO SIDE WHILE IN FLAT BAD: A LITTLE
MOBILITY SCORE: 18
DAILY ACTIVITIY SCORE: 18
PERSONAL GROOMING: A LITTLE
MOVING FROM LYING ON BACK TO SITTING ON SIDE OF FLAT BED WITH BEDRAILS: A LITTLE
DAILY ACTIVITIY SCORE: 23
HELP NEEDED FOR BATHING: A LITTLE
CLIMB 3 TO 5 STEPS WITH RAILING: A LITTLE

## 2024-04-16 ASSESSMENT — PAIN SCALES - GENERAL
PAINLEVEL_OUTOF10: 3
PAINLEVEL_OUTOF10: 7

## 2024-04-16 ASSESSMENT — ENCOUNTER SYMPTOMS
ARTHRALGIAS: 1
NECK PAIN: 1
MYALGIAS: 1

## 2024-04-16 NOTE — PROGRESS NOTES
Physical Therapy    Physical Therapy Evaluation    Patient Name: Misael Lopez  MRN: [id number]  Today's Date: 4/16/2024   Time Calculation  Start Time: 0905  Stop Time: 0915  Time Calculation (min): 10 min    Assessment/Plan   PT Assessment  End of Session Communication: Bedside nurse  End of Session Patient Position: Up in chair, Alarm off, not on at start of session  IP OR SWING BED PT PLAN  Inpatient or Swing Bed: Inpatient  PT Plan  PT Plan: PT Eval only  PT Eval Only Reason:  (indep txs and amb, s on stairs for safety, knowledgeable on c-sp precautions)  PT Frequency: PT eval only  PT Discharge Recommendations: No further acute PT, No PT needed after discharge  PT - OK to Discharge: Yes      Subjective   General Visit Information:  General  Reason for Referral: Date/Time: 04/15/24 0730     Procedure: C3-4, C4-5, C5-6, C6-7 ANTERIOR DISC EXCISIONS/ ALLOGRAFT FUSION & PLATE FIXATION C3-7  Past Medical History Relevant to Rehab: +) Angina pectoris   (+) Coronary artery disease  (+) Heart block  (+) Heart murmur    Hyperlipidemia  (+) Hypertension, benign  (+) Myocardial infarct (Multi)  (+) PVC's (premature ventricular contractions)     Pulmonary  (+) Obstructive sleep apnea syndrome     Neuro  (+) Depression  (+) Severe episode of recurrent major depressive disorder, without psychotic features (Multi)     GI  (+) Gastroesophageal reflux disease without esophagitis     /Renal  (+) Benign prostatic hyperplasia  (+) Calculus of kidney     Endocrine  (+) Obesity, unspecified     Musculoskeletal  (+) Cervical spondylosis with myelopathy  (+) Degeneration of lumbar or lumbosacral intervertebral disc  (+) Lumbar stenosis with neurogenic claudication  (+) Lumbosacral spondylosis  (+) Osteoarthritis of carpometacarpal (CMC) joint of left thumb  (+) Primary osteoarthritis of left knee  Prior to Session Communication: Bedside nurse  Patient Position Received: Up in chair, Alarm off, not on at start of session  General  Comment: wearing hard c collar through out eval  Home Living:  Home Living  Type of Home: House  Lives With: Spouse  Home Adaptive Equipment: Walker rolling or standard, Cane (rollator)  Home Layout: Able to live on main level with bedroom/bathroom  Home Access: Stairs to enter with rails    Entrance Stairs-Number of Steps: 3  Prior Level of Function:  Prior Function Per Pt/Caregiver Report  Level of [place]:  (indep w/o ad)  Homemaking Assistance:  (per wife, pt drives , retired)  Precautions:  Precautions  Precautions Comment: falls, hard c-collar, c-spine precuations  Vital Signs:       Objective   Pain:  Pain Assessment  Pain Score:  (3/10 sx site)  Cognition:  Cognition  Overall Cognitive Status: Within Functional Limits    General Assessments:                  Sensation  Sensation Comment: n&t r fingers  Functional Assessments:       Transfers  Transfer:  (indep)    Ambulation/Gait Training  Ambulation/Gait Training Performed:  (indep w/o ad 150ft , no lob)    Stairs  Stairs:  (s 4 steps w 1 hr, no lob, vc for safety)  Extremity/Trunk Assessments:  RLE   RLE : Within Functional Limits  LLE   LLE : Within Functional Limits  Outcome Measures:  [facility] Basic Mobility  Turning from your back to your side while in a flat bed without using bedrails: None  Moving from lying on your back to sitting on the side of a flat bed without using bedrails: None  Moving to and from bed to chair (including a wheelchair): None  Standing up from a chair using your arms (e.g. wheelchair or bedside chair): None  To walk in hospital room: None  Climbing 3-5 steps with railing: A little  Basic Mobility - Total Score: 23          Education Documentation  No documentation found.  Education Comments  No comments found.

## 2024-04-16 NOTE — CARE PLAN
Problem: Pain - Adult  Goal: Verbalizes/displays adequate comfort level or baseline comfort level  4/16/2024 0842 by Deann Hensley RN  Outcome: Progressing  4/16/2024 0835 by Deann Hensley RN  Outcome: Progressing

## 2024-04-16 NOTE — POST-PROCEDURE NOTE
"Feels well this morning. Tingling / numbness in right 1st, 2nd, 3rd fingers. He is aware of symptom, but is not having difficulty using right hand  Has ambulated in halls overnight    Medications:  Scheduled medications  acetaminophen, 975 mg, oral, q8h  atorvastatin, 20 mg, oral, Daily  bromocriptine, 5 mg, oral, Daily  ceFAZolin, 2 g, intravenous, Once  ceFAZolin, 2 g, intravenous, q8h  docusate sodium, 100 mg, oral, BID  FLUoxetine, 20 mg, oral, Daily  mirtazapine, 15 mg, oral, Nightly  ranolazine, 500 mg, oral, q12h  tamsulosin, 0.4 mg, oral, Daily      Continuous medications  lactated Ringer's, 100 mL/hr, Last Rate: Stopped (04/15/24 1308)  lactated Ringer's, 100 mL/hr  lactated Ringer's, 100 mL/hr, Last Rate: 100 mL/hr (04/15/24 2102)      PRN medications  PRN medications: cyclobenzaprine, dextrose, dextrose, glucagon, glucagon, morphine, naloxone, ondansetron **OR** ondansetron, oxyCODONE, oxyCODONE, oxygen    /66 (BP Location: Right arm, Patient Position: Lying)   Pulse 64   Temp 36.4 °C (97.5 °F) (Temporal)   Resp 16   Ht 1.702 m (5' 7.01\")   Wt 97 kg (213 lb 13.5 oz)   SpO2 94%   BMI 33.49 kg/m²     Well nourished, well developed male, resting in bedside chair  A&O x 3, speech clear / fluent  Skin is warm / dry, no obvious lesions on exposed skin   Thorax is midline; chest expansion is symmetric  MONTES DE OCA easily. Able to elevate BUE above head, strength is 5/5 BUE  Abdomen is not distended  Cervical Spine incision is well approximated, no swelling / drainage / erythema  Surgical drain output: 50 + 10 mL = 60 mL  Drain removed without incident  Urine - Baron removed this morning      POD#1  ACDF C3 - 7 by Dr. Davonte Mauricio:  - Doing well, tolerating liquid diet well (diet advanced for breakfast)  - Plan for discharge home today. X-ray C Spine images reviewed: hardware intact.  - Discharge instructions discussed with patient who verbalizes understanding and agreement.  - Follow up in 2 weeks as " previously scheduled.  Alysia Fuentes, APRN-CNP

## 2024-04-16 NOTE — DISCHARGE SUMMARY
"Discharge Diagnosis  Cervical spondylosis with myelopathy    Issues Requiring Follow-Up  Incision assessment    Test Results Pending At Discharge  Pending Labs       Order Current Status    Basic metabolic panel In process            Hospital Course   Admitted for cervical decompression / fusion  Underwent ACDF C3 - 7 on 04/15/2024.  Noted paresthesia in right 1st, 2nd, 3rd fingers post operatively. Otherwise, no post operative complications    /66 (BP Location: Right arm, Patient Position: Lying)   Pulse 64   Temp 36.4 °C (97.5 °F) (Temporal)   Resp 16   Ht 1.702 m (5' 7.01\")   Wt 97 kg (213 lb 13.5 oz)   SpO2 94%   BMI 33.49 kg/m²     Pertinent Physical Exam At Time of Discharge  Physical Exam  Well nourished, well developed male, resting in bedside chair  A&O x 3, speech clear / fluent  Skin is warm / dry, no obvious lesions on exposed skin   Thorax is midline; chest expansion is symmetric  MONTES DE OCA easily. Able to elevate BUE above head, strength is 5/5 BUE  Abdomen is not distended  Cervical Spine incision is well approximated, no swelling / drainage / erythema  Surgical drain output: 50 + 10 mL = 60 mL  Drain removed without incident  Urine - Baron removed this morning  Home Medications     Medication List      START taking these medications     acetaminophen 325 mg tablet; Commonly known as: Tylenol; Take 3 tablets   (975 mg) by mouth every 8 hours if needed for mild pain (1 - 3) (post op   pain).   cyclobenzaprine 10 mg tablet; Commonly known as: Flexeril; Take 1 tablet   (10 mg) by mouth 3 times a day as needed for muscle spasms (post op pain).   docusate sodium 100 mg capsule; Commonly known as: Colace; Take 1   capsule (100 mg) by mouth 2 times a day as needed for constipation.   oxyCODONE 5 mg immediate release tablet; Commonly known as: Roxicodone;   Take 1 tablet (5 mg) by mouth every 6 hours if needed for moderate pain (4   - 6) or severe pain (7 - 10) (post op pain).     CHANGE how you take " these medications     aspirin 81 mg EC tablet; Take 1 tablet (81 mg) by mouth 1 time for 1   dose. May RESTART ON APRIL 19, 2024; What changed: additional instructions     CONTINUE taking these medications     atorvastatin 20 mg tablet; Commonly known as: Lipitor; Take 1 tablet (20   mg) by mouth once daily.   bromocriptine 5 mg capsule; Commonly known as: Parlodel   calcium acetate 667 mg tablet; Commonly known as: Phoslo   cyanocobalamin 100 mcg tablet; Commonly known as: Vitamin B-12   FiberCon 625 mg tablet; Generic drug: calcium polycarbophiL   mirtazapine 15 mg tablet; Commonly known as: Remeron   multivitamin with minerals tablet   Protonix 40 mg packet; Generic drug: pantoprazole   PROzac 20 mg capsule; Generic drug: FLUoxetine   ranolazine 500 mg 12 hr tablet; Commonly known as: Ranexa; Take 1 tablet   (500 mg) by mouth every 12 hours.   tamsulosin 0.4 mg 24 hr capsule; Commonly known as: Flomax       Outpatient Follow-Up  Future Appointments   Date Time Provider Department Center   4/29/2024  1:00 PM JEIMY Pollock [id number] [place]   5/31/2024 11:30 AM Davonte Mauricio MD [id number] [place]   7/9/2024  3:30 PM MD MANOJ [id number] [place]       JEIMY Pollock

## 2024-04-16 NOTE — CARE PLAN
Problem: Pain - Adult  Goal: Verbalizes/displays adequate comfort level or baseline comfort level  4/16/2024 1213 by Deann Hensley RN  Outcome: Adequate for Discharge  4/16/2024 0842 by Deann Hensley RN  Outcome: Progressing  4/16/2024 0835 by Deann Hensley RN  Outcome: Progressing

## 2024-04-16 NOTE — NURSING NOTE
"NV STATUS UNCHANGED.  HG= AND STRONG BILAT.  ABLE TO LIFT BILAT ARMS.  \"SWOLLEN\" FEELING TO FIRST THREE FINGERS RT HAND.  +D/P FLEXION BILAT LE.  +3 RADIAL PULSES.  UP AND AMBULATED IN HALLS WITH STEADY GAIT USING WHEELED WALKER, CIRCLED THE FLOOR X 4.  RATES PAIN 2/10.    "

## 2024-04-16 NOTE — H&P
History Of Present Illness  Misael Lopez is a 73 y.o. male presenting with cervical spine surgery for cervical radiculopathy..     Past Medical History  He has a past medical history of Anxiety, Arthritis, Coronary artery disease, Depression, GERD (gastroesophageal reflux disease), HL (hearing loss), Myocardial infarction (Multi), Nephrolithiasis, Other specified health status, and Sleep apnea.    He has no past medical history of CKD (chronic kidney disease).    Surgical History  He has a past surgical history that includes Other surgical history (09/16/2015); Vasectomy (09/16/2015); Shoulder surgery (09/17/2015); Bunionectomy (09/17/2015); Lithotripsy (09/17/2015); Nasal septum surgery (09/17/2015); Carpal tunnel release (09/17/2015); Breast lumpectomy (09/17/2015); Other surgical history (11/16/2021); Gastric bypass (03/31/2016); Other surgical history (11/16/2021); Cardiac catheterization; Colonoscopy; Upper gastrointestinal endoscopy; Lumbar laminectomy; and Knee arthroscopy w/ debridement.     Social History  He reports that he has never smoked. He has never used smokeless tobacco. He reports current alcohol use. He reports that he does not use drugs.    Family History  Family History   Problem Relation Name Age of Onset    Coronary artery disease Mother      Coronary artery disease Father          Allergies  Nitroglycerin    Review of Systems   Musculoskeletal:  Positive for arthralgias, myalgias and neck pain.   All other systems reviewed and are negative.       Physical Exam  Vitals and nursing note reviewed.   Constitutional:       Appearance: Normal appearance.   HENT:      Head: Normocephalic.      Right Ear: Tympanic membrane, ear canal and external ear normal.      Left Ear: Tympanic membrane, ear canal and external ear normal.      Nose: Nose normal.      Mouth/Throat:      Mouth: Mucous membranes are moist.      Pharynx: Oropharynx is clear.   Eyes:      Extraocular Movements: Extraocular movements  intact.      Conjunctiva/sclera: Conjunctivae normal.      Pupils: Pupils are equal, round, and reactive to light.   Cardiovascular:      Rate and Rhythm: Normal rate and regular rhythm.      Pulses: Normal pulses.      Heart sounds: Normal heart sounds.   Pulmonary:      Effort: Pulmonary effort is normal.      Breath sounds: Normal breath sounds.   Abdominal:      General: Abdomen is flat. Bowel sounds are normal.      Palpations: Abdomen is soft.   Genitourinary:     Penis: Normal.       Testes: Normal.      Prostate: Normal.      Rectum: Normal.   Musculoskeletal:         General: Normal range of motion.      Cervical back: Normal range of motion and neck supple.   Skin:     General: Skin is warm and dry.   Neurological:      General: No focal deficit present.      Mental Status: He is alert and oriented to person, place, and time. Mental status is at baseline.   Psychiatric:         Mood and Affect: Mood normal.         Behavior: Behavior normal.         Thought Content: Thought content normal.         Judgment: Judgment normal.          Last Recorded Vitals  /71 (Patient Position: Sitting)   Pulse 64   Temp 36.3 °C (97.3 °F)   Resp 18   Wt 97 kg (213 lb 13.5 oz)   SpO2 95%     Relevant Results             Assessment/Plan   Principal Problem:    Cervical spondylosis with myelopathy  Active Problems:    Myelomalacia of cervical cord (Multi)      Patient fully evaluated on April 16 postop and doing well.  Significant improvement in motor strength in the upper extremities.  No other complaints noted lab work unremarkable.  Patient cleared for discharge.       Hilario Galvin MD

## 2024-04-16 NOTE — PROGRESS NOTES
Misael Lopez is a 73 y.o. male on day 1 of admission presenting with Cervical spondylosis with myelopathy.      Subjective   Patient fully evaluated on April 16, 2024.        Objective     Last Recorded Vitals  /71 (BP Location: Right arm, Patient Position: Sitting)   Pulse 73   Temp 36.3 °C (97.3 °F) (Temporal)   Resp 18   Wt 97 kg (213 lb 13.5 oz)   SpO2 95%   Intake/Output last 3 Shifts:    Intake/Output Summary (Last 24 hours) at 4/16/2024 0934  Last data filed at 4/16/2024 0900  Gross per 24 hour   Intake 5825.24 ml   Output 4430 ml   Net 1395.24 ml       Admission Weight  Weight: 97 kg (213 lb 13.5 oz) (04/16/24 0358)    Daily Weight  04/16/24 : 97 kg (213 lb 13.5 oz)    Image Results  XR cervical spine 2-3 views  Narrative: Interpreted By:  Petros Coker,   STUDY:  XR CERVICAL SPINE 2-3 VIEWS 4/16/2024 6:08 am      INDICATION:  Signs/Symptoms:Assess surgical construct      COMPARISON:  None available.      ACCESSION NUMBER(S):  [id number]      ORDERING CLINICIAN:  LUDY NICHOLE      TECHNIQUE:  Multiple views C-spine radiographs performed      FINDINGS:  Status post cervical spine fixation. Straightening of the cervical  spine could be related to underlying muscle spasm or positional. No  acute displaced fracture. Redemonstrated multilevel cervical  spondylosis with disc space narrowing. Right-sided neck drainage  catheter noted.      Impression: 1. Status post cervical spine fixation with no major malalignment or  displaced fractures.      Signed by: Petros Coker 4/16/2024 7:38 AM  Dictation workstation:   [id number]      Physical Exam  Constitutional:       General: No acute distress.     Aox3, pleasant and cooperative, appropriate mood and eye contact   HENT:      Head: Normocephalic. Neck in hard brace.      Mouth/Throat: Mucous membranes moist & pink  Eyes:      Vision grossly intact, PERRLA, corrective lenses   Neck:      No carotid bruit, no JVD  Cardiovascular:      RRR, S1S2, no  murmurs, rubs or gallops  Pulmonary:      Symmetric chest expansion, CTA, Room Air  Abdominal:      Soft non-tender, BSx4   Skin:     Warm, dry & intact   Extremities:      No gross deformities; abnormal gait   Neurological:      No focal deficit, Aox3, MONTES DE OCA x4  Psychiatric:      Pleasant & cooperative, appropriate affect    Relevant Results  Scheduled medications  acetaminophen, 975 mg, oral, q8h  atorvastatin, 20 mg, oral, Daily  bromocriptine, 5 mg, oral, Daily  ceFAZolin, 2 g, intravenous, Once  docusate sodium, 100 mg, oral, BID  FLUoxetine, 20 mg, oral, Daily  mirtazapine, 15 mg, oral, Nightly  ranolazine, 500 mg, oral, q12h  tamsulosin, 0.4 mg, oral, Daily      Continuous medications  lactated Ringer's, 100 mL/hr, Last Rate: Stopped (04/15/24 1308)  lactated Ringer's, 100 mL/hr  lactated Ringer's, 100 mL/hr, Last Rate: 100 mL/hr (04/15/24 2102)      PRN medications  PRN medications: cyclobenzaprine, dextrose, dextrose, glucagon, glucagon, morphine, naloxone, ondansetron **OR** ondansetron, oxyCODONE, oxyCODONE, oxygen    Results for orders placed or performed during the hospital encounter of 04/15/24 (from the past 24 hour(s))   CBC   Result Value Ref Range    WBC 11.6 (H) 4.4 - 11.3 x10*3/uL    nRBC 0.0 0.0 - 0.0 /100 WBCs    RBC 4.12 (L) 4.50 - 5.90 x10*6/uL    Hemoglobin 13.8 13.5 - 17.5 g/dL    Hematocrit 40.4 (L) 41.0 - 52.0 %    MCV 98 80 - 100 fL    MCH 33.5 26.0 - 34.0 pg    MCHC 34.2 32.0 - 36.0 g/dL    RDW 12.4 11.5 - 14.5 %    Platelets 197 150 - 450 x10*3/uL   Basic metabolic panel   Result Value Ref Range    Glucose 130 (H) 74 - 99 mg/dL    Sodium 138 136 - 145 mmol/L    Potassium 4.4 3.5 - 5.3 mmol/L    Chloride 103 98 - 107 mmol/L    Bicarbonate 25 21 - 32 mmol/L    Anion Gap 14 10 - 20 mmol/L    Urea Nitrogen 13 6 - 23 mg/dL    Creatinine 0.78 0.50 - 1.30 mg/dL    eGFR >90 >60 mL/min/1.73m*2    Calcium 9.4 8.6 - 10.3 mg/dL                    Assessment/Plan        Principal Problem:    Cervical  spondylosis with myelopathy  Active Problems:    Myelomalacia of cervical cord (Multi)    Patient fully evaluated on April 16, 2024. Patient WBC slightly elevated at 11.6. Patient kidney function acceptable. Patient currently in neck brace, tolerating diet. Patient to discharge home when medically ready.               SHARON SAMUELS

## 2024-04-16 NOTE — PROGRESS NOTES
Occupational Therapy    Evaluation    Patient Name: Misael Lopez  MRN: [id number]  Today's Date: 4/16/2024  Time Calculation  Start Time: 0902  Stop Time: 0913  Time Calculation (min): 11 min        Assessment:  End of Session Communication: Bedside nurse    Strengths: Access to adaptive/assistive products, Capable of completing ADLs semi/independent, Insight into problems, Housing layout, Living arrangement secure, Physical health, Premorbid level of function, Support and attitude of living partners  Plan:  No Skilled OT: No acute OT goals identified  OT Frequency: OT eval only  OT - OK to Discharge: Yes       Subjective   Current Problem:  1. Cervical spondylosis with myelopathy  Pulse oximetry, spot    lactated Ringer's infusion    Admit to inpatient    Pulse oximetry, spot    Admit to inpatient    CANCELED: Vital Signs    CANCELED: Full code    CANCELED: Interoperative monitoring - IOM    CANCELED: Vital Signs    CANCELED: Full code    CANCELED: Interoperative monitoring - IOM      2. Myelomalacia of cervical cord (Multi)  Pulse oximetry, spot    lactated Ringer's infusion    Admit to inpatient    Pulse oximetry, spot    Admit to inpatient    CANCELED: Vital Signs    CANCELED: Full code    CANCELED: Interoperative monitoring - IOM    CANCELED: Vital Signs    CANCELED: Full code    CANCELED: Interoperative monitoring - IOM      3. Acute post-operative pain  acetaminophen (Tylenol) 325 mg tablet    cyclobenzaprine (Flexeril) 10 mg tablet    oxyCODONE (Roxicodone) 5 mg immediate release tablet      4. Drug-induced constipation  docusate sodium (Colace) 100 mg capsule      5. Hypertension, benign  aspirin 81 mg EC tablet      6. Angina pectoris (CMS-HCC)  aspirin 81 mg EC tablet      7. NSVT (nonsustained ventricular tachycardia) (Multi)  aspirin 81 mg EC tablet      8. History of coronary artery stent placement  aspirin 81 mg EC tablet      9. Pure hypercholesterolemia  aspirin 81 mg EC tablet         General:  General  Reason for Referral: OT eval and tx; ADLs. dx; * Cervical spondylosis with myelopathy (M47.12)     * Myelomalacia of cervical cord (Multi)     s/p : C3-4, C4-5, C5-6, C6-7 ANTERIOR DISC EXCISIONS/ ALLOGRAFT FUSION & PLATE FIXATION C3-7 WITH FLAT PLATE CERVICAL X-RAY  Referred By: Hang  Past Medical History Relevant to Rehab: HTN/HPL, heart block, CHF, NSVT h/o MI, CAD (s/p Stent), prior gastric bypass, GERD, renal stones, depression, OA, BPH  Prior to Session Communication: Bedside nurse  Patient Position Received:  (patient seated in chair at start and end of eval, call light in reach, all needs met. alarm off upon arrival. Hard cervical collar donned throughout)  General Comment: XR C-spine: moderate spondylosis  Precautions:  Medical Precautions:  (C-spine/spinal precautions, Hard Cervical collar, up in chair)       Pain:  Pain Assessment  Pain Assessment:  (reports 3/10 pain at surgical site)    Objective   Cognition:  Overall Cognitive Status: Within Functional Limits           Home Living:  Type of Home:  (per patient report, lives with spouse, 3 MEGAN house with HR. has 1st floor set up)  Bathroom Shower/Tub:  (walk in shower, owns shower chair, grab bar, handheld shower)  Bathroom Toilet:  (high rise toilet with grab bar)  Prior Function:  Level of [place]:  (independent in ADLs PLOF. spouse does IADLs. no AD use PLOF. owns walker, WW, cane, rollator, reacher. patient drives and is retired. patient spouse works 1 day a week as an RN at [employer])       ADL:  ADL Comments: anticipate MOD I- supervision for ADLs based on performance with transfers and mobility/precautions    Bed Mobility/Transfers: Transfers  Transfer:  (completed STS at [facility] without AD)      Functional Mobility:  Functional Mobility  Functional Mobility Performed:  (engaged in simple mobility at [facility] without AD)     Sensation:  Sensation Comment: patient reports numbness/tingling in right 1st, 2nd, and 3rd fingers  post op  Strength:  Strength Comments: did not formally assess due to precautions, however appears to be grossly WFL as seen through transfers and mobility    Outcome Measures:[facility] Daily Activity  Putting on and taking off regular lower body clothing: None  Bathing (including washing, rinsing, drying): A little  Putting on and taking off regular upper body clothing: None  Toileting, which includes using toilet, bedpan or urinal: None  Taking care of personal grooming such as brushing teeth: None  Eating Meals: None  Daily Activity - Total Score: 23        Education Documentation  Body Mechanics, taught by Almaz Ureña OT at 4/16/2024 10:35 AM.  Learner: Patient  Readiness: Acceptance  Method: Explanation  Response: Verbalizes Understanding    Precautions, taught by Almaz Ureña OT at 4/16/2024 10:35 AM.  Learner: Patient  Readiness: Acceptance  Method: Explanation  Response: Verbalizes Understanding    ADL Training, taught by Almaz Ureña OT at 4/16/2024 10:35 AM.  Learner: Patient  Readiness: Acceptance  Method: Explanation  Response: Verbalizes Understanding    Education Comments  No comments found.        OP EDUCATION:  Education  Individual(s) Educated: Patient  Education Comment: patient educated extensively upon C-spine/spinal precautions this date. educated upon use of AE, however, patient declines AE as able to demo figure 4 technique to complete LB dressing within precautions. also verbalizes his wife can assist if he needs. demo good understanding of precautions overall. also educated upon home health OT, patient declines needs for this at this time. patient also verbalizes understanding of wearing button down shirts due to Hard c-collar and precautions

## 2024-04-16 NOTE — DISCHARGE INSTRUCTIONS
May shower. Do NOT allow shower stream to directly hit incision  Do not rub / scrub incision. Pat / dab dry  Wear cervical collar at all times  No driving  Walk hourly while awake  Maintain hydration  Eat SOFT diet, SMALL bites, chew well

## 2024-04-17 ENCOUNTER — APPOINTMENT (OUTPATIENT)
Dept: CARDIOLOGY | Facility: CLINIC | Age: 74
End: 2024-04-17
Payer: MEDICARE

## 2024-04-29 ENCOUNTER — OFFICE VISIT (OUTPATIENT)
Dept: NEUROSURGERY | Facility: CLINIC | Age: 74
End: 2024-04-29
Payer: MEDICARE

## 2024-04-29 VITALS
SYSTOLIC BLOOD PRESSURE: 96 MMHG | TEMPERATURE: 97.4 F | HEART RATE: 64 BPM | BODY MASS INDEX: 32.62 KG/M2 | DIASTOLIC BLOOD PRESSURE: 54 MMHG | WEIGHT: 203 LBS | HEIGHT: 66 IN | RESPIRATION RATE: 18 BRPM

## 2024-04-29 DIAGNOSIS — Z98.1 S/P CERVICAL SPINAL FUSION: Primary | ICD-10-CM

## 2024-04-29 PROCEDURE — 1111F DSCHRG MED/CURRENT MED MERGE: CPT | Performed by: NURSE PRACTITIONER

## 2024-04-29 PROCEDURE — 1036F TOBACCO NON-USER: CPT | Performed by: NURSE PRACTITIONER

## 2024-04-29 PROCEDURE — 3074F SYST BP LT 130 MM HG: CPT | Performed by: NURSE PRACTITIONER

## 2024-04-29 PROCEDURE — 99024 POSTOP FOLLOW-UP VISIT: CPT | Performed by: NURSE PRACTITIONER

## 2024-04-29 PROCEDURE — 1160F RVW MEDS BY RX/DR IN RCRD: CPT | Performed by: NURSE PRACTITIONER

## 2024-04-29 PROCEDURE — 1126F AMNT PAIN NOTED NONE PRSNT: CPT | Performed by: NURSE PRACTITIONER

## 2024-04-29 PROCEDURE — 1159F MED LIST DOCD IN RCRD: CPT | Performed by: NURSE PRACTITIONER

## 2024-04-29 PROCEDURE — 3078F DIAST BP <80 MM HG: CPT | Performed by: NURSE PRACTITIONER

## 2024-04-29 RX ORDER — AMOXICILLIN 500 MG/1
2000 CAPSULE ORAL ONCE
Qty: 4 CAPSULE | Refills: 0 | Status: SHIPPED | OUTPATIENT
Start: 2024-04-29 | End: 2024-04-29

## 2024-04-29 ASSESSMENT — ENCOUNTER SYMPTOMS
DEPRESSION: 0
OCCASIONAL FEELINGS OF UNSTEADINESS: 0
LOSS OF SENSATION IN FEET: 0

## 2024-04-29 ASSESSMENT — PAIN SCALES - GENERAL: PAINLEVEL: 0-NO PAIN

## 2024-04-29 NOTE — PROGRESS NOTES
"Misael Lopez is a 73 y.o. male who is here for his 1st post op visit. He underwent ACDF C3 - 7 on 04/15/2024, at [facility] by Dr. Davonte Mauricio. He was discharged home on 04/16/2024.    Since discharge from the hospital, he feels better overall. He is tolerating pain medications. Activity level - he is able to complete ADLs without assistance. He has been compliant with collar as instructed    Smoker: No  Anticoagulation: No    BP 96/54   Pulse 64   Temp 36.3 °C (97.4 °F)   Resp 18   Ht 1.676 m (5' 6\")   Wt 92.1 kg (203 lb)   BMI 32.77 kg/m²     On exam: cervical collar in place  A&O x 3, speech clear / fluent  Respirations even / unlabored  MONTES DE OCA - able to elevate BUE above head easily  SURGICAL INCISION is: well approximated, no erythema / swelling / drainage  Gait is steady    Misael Lopez is progressing well post operatively. He is going to have implant started on 05/20/2024. Discussed prophylaxis with amoxicillin - he verbalizes understanding and agreement to wear his collar during procedure.  He agrees to follow up with Dr. Mauricio as previously scheduled, 05/31/2024, with cervical spine x-rays prior to visit.  BILL Pollock-CNP      "

## 2024-05-13 ENCOUNTER — OFFICE VISIT (OUTPATIENT)
Dept: CARDIOLOGY | Facility: CLINIC | Age: 74
End: 2024-05-13
Payer: MEDICARE

## 2024-05-13 VITALS
HEIGHT: 66 IN | DIASTOLIC BLOOD PRESSURE: 78 MMHG | SYSTOLIC BLOOD PRESSURE: 124 MMHG | BODY MASS INDEX: 34.55 KG/M2 | WEIGHT: 215 LBS | HEART RATE: 68 BPM | OXYGEN SATURATION: 98 %

## 2024-05-13 DIAGNOSIS — I25.10 CORONARY ARTERY DISEASE INVOLVING NATIVE HEART, UNSPECIFIED VESSEL OR LESION TYPE, UNSPECIFIED WHETHER ANGINA PRESENT: ICD-10-CM

## 2024-05-13 DIAGNOSIS — I50.32 CHRONIC HEART FAILURE WITH PRESERVED EJECTION FRACTION (MULTI): ICD-10-CM

## 2024-05-13 DIAGNOSIS — Z98.84 S/P GASTRIC BYPASS: ICD-10-CM

## 2024-05-13 DIAGNOSIS — I25.10 CORONARY ARTERY DISEASE INVOLVING NATIVE CORONARY ARTERY OF NATIVE HEART WITHOUT ANGINA PECTORIS: Primary | ICD-10-CM

## 2024-05-13 DIAGNOSIS — I45.9 HEART BLOCK: ICD-10-CM

## 2024-05-13 DIAGNOSIS — E66.9 CLASS 1 OBESITY WITHOUT SERIOUS COMORBIDITY IN ADULT, UNSPECIFIED BMI, UNSPECIFIED OBESITY TYPE: ICD-10-CM

## 2024-05-13 DIAGNOSIS — I10 HYPERTENSION, BENIGN: ICD-10-CM

## 2024-05-13 DIAGNOSIS — I47.29 NSVT (NONSUSTAINED VENTRICULAR TACHYCARDIA) (MULTI): ICD-10-CM

## 2024-05-13 DIAGNOSIS — R60.9 EDEMA, UNSPECIFIED TYPE: ICD-10-CM

## 2024-05-13 DIAGNOSIS — Z98.1 S/P CERVICAL SPINAL FUSION: ICD-10-CM

## 2024-05-13 DIAGNOSIS — I49.3 PVC'S (PREMATURE VENTRICULAR CONTRACTIONS): ICD-10-CM

## 2024-05-13 DIAGNOSIS — G47.33 OBSTRUCTIVE SLEEP APNEA SYNDROME: ICD-10-CM

## 2024-05-13 DIAGNOSIS — Z95.5 HISTORY OF CORONARY ARTERY STENT PLACEMENT: ICD-10-CM

## 2024-05-13 PROBLEM — R20.2 PARESTHESIA OF UPPER EXTREMITY: Status: ACTIVE | Noted: 2024-02-06

## 2024-05-13 PROCEDURE — 1160F RVW MEDS BY RX/DR IN RCRD: CPT | Performed by: INTERNAL MEDICINE

## 2024-05-13 PROCEDURE — 3078F DIAST BP <80 MM HG: CPT | Performed by: INTERNAL MEDICINE

## 2024-05-13 PROCEDURE — 3074F SYST BP LT 130 MM HG: CPT | Performed by: INTERNAL MEDICINE

## 2024-05-13 PROCEDURE — 93000 ELECTROCARDIOGRAM COMPLETE: CPT | Performed by: INTERNAL MEDICINE

## 2024-05-13 PROCEDURE — 99214 OFFICE O/P EST MOD 30 MIN: CPT | Performed by: INTERNAL MEDICINE

## 2024-05-13 PROCEDURE — 1111F DSCHRG MED/CURRENT MED MERGE: CPT | Performed by: INTERNAL MEDICINE

## 2024-05-13 PROCEDURE — 1159F MED LIST DOCD IN RCRD: CPT | Performed by: INTERNAL MEDICINE

## 2024-05-13 PROCEDURE — 1036F TOBACCO NON-USER: CPT | Performed by: INTERNAL MEDICINE

## 2024-05-13 RX ORDER — RANOLAZINE 500 MG/1
500 TABLET, EXTENDED RELEASE ORAL EVERY 12 HOURS
Qty: 180 TABLET | Refills: 3 | Status: SHIPPED | OUTPATIENT
Start: 2024-05-13 | End: 2025-05-13

## 2024-05-13 RX ORDER — ATORVASTATIN CALCIUM 20 MG/1
20 TABLET, FILM COATED ORAL DAILY
Qty: 90 TABLET | Refills: 3 | Status: SHIPPED | OUTPATIENT
Start: 2024-05-13

## 2024-05-13 NOTE — PROGRESS NOTES
"  Subjective  Misael Lopez  is a 73 y.o. year old male who presents for F/U ASHD.  Iunderwent ACDF C3-7 4/15/24.  Feels like he hs too much edema especially of the upper exremities.  No chest pain, no dyspnea, no palapitions    Blood pressure 124/78, pulse 68, height 1.676 m (5' 6\"), weight 97.5 kg (215 lb), SpO2 98%.   Nitroglycerin  Past Medical History:   Diagnosis Date    Anxiety     Arthritis     Coronary artery disease     Depression     GERD (gastroesophageal reflux disease)     HL (hearing loss)     Myocardial infarction (Multi)     Nephrolithiasis     Other specified health status     No pertinent past medical history    Sleep apnea      Past Surgical History:   Procedure Laterality Date    BREAST LUMPECTOMY  09/17/2015    Breast Surgery Lumpectomy    BUNIONECTOMY  09/17/2015    Simple Bunion Exostectomy ([facility] Procedure)    CARDIAC CATHETERIZATION      CARPAL TUNNEL RELEASE  09/17/2015    Neuroplasty Decompression Median Nerve At Carpal Tunnel    CERVICAL SPINE SURGERY  04/15/2024    ACDFP    COLONOSCOPY      GASTRIC BYPASS  03/31/2016    Gastric Surgery For Morbid Obesity Gastric Bypass    KNEE ARTHROSCOPY W/ DEBRIDEMENT      LITHOTRIPSY  09/17/2015    Renal Lithotripsy    LUMBAR LAMINECTOMY      NASAL SEPTUM SURGERY  09/17/2015    Nasal Septal Deviation Repair    OTHER SURGICAL HISTORY  09/16/2015    Hemilaminectomy    OTHER SURGICAL HISTORY  11/16/2021    Cardiac catheterization    OTHER SURGICAL HISTORY  11/16/2021    Cardiac catheterization with stent placement    SHOULDER SURGERY  09/17/2015    Shoulder Surgery    UPPER GASTROINTESTINAL ENDOSCOPY      VASECTOMY  09/16/2015    Surgery Vas Deferens Vasectomy     Family History   Problem Relation Name Age of Onset    Coronary artery disease Mother      Coronary artery disease Father       @SOC    Current Outpatient Medications   Medication Sig Dispense Refill    acetaminophen (Tylenol) 325 mg tablet Take 3 tablets (975 mg) by mouth every 8 hours if " needed for mild pain (1 - 3) (post op pain).      atorvastatin (Lipitor) 20 mg tablet Take 1 tablet (20 mg) by mouth once daily. 90 tablet 3    bromocriptine (Parlodel) 5 mg capsule Take 1 capsule (5 mg) by mouth once daily.      calcium acetate (Phoslo) 667 mg tablet Take 1 tablet (667 mg) by mouth once daily.      calcium polycarbophiL (FiberCon) 625 mg tablet Take 1 tablet (625 mg) by mouth once daily.      cyanocobalamin (Vitamin B-12) 100 mcg tablet Take 1 tablet (100 mcg) by mouth once daily.      cyclobenzaprine (Flexeril) 10 mg tablet Take 1 tablet (10 mg) by mouth 3 times a day as needed for muscle spasms (post op pain). 30 tablet 1    docusate sodium (Colace) 100 mg capsule Take 1 capsule (100 mg) by mouth 2 times a day as needed for constipation.      mirtazapine (Remeron) 15 mg tablet Take 1 tablet (15 mg) by mouth once daily at bedtime.      multivitamin with minerals (multivit-min-iron fum-folic ac) tablet Take 1 tablet by mouth once daily.      pantoprazole (Protonix) 40 mg packet Take 1 packet (40 mg) by mouth once daily in the morning. Take before meals.      PROzac 20 mg capsule Take 1 capsule (20 mg) by mouth once daily.      ranolazine (Ranexa) 500 mg 12 hr tablet Take 1 tablet (500 mg) by mouth every 12 hours. 90 tablet 3    tamsulosin (Flomax) 0.4 mg 24 hr capsule Take 1 capsule (0.4 mg) by mouth once daily.       No current facility-administered medications for this visit.        ROS  Review of Systems   All other systems reviewed and are negative.      Physical Exam  Physical Exam  Constitutional:       Appearance: He is obese.   HENT:      Head: Normocephalic and atraumatic.   Cardiovascular:      Rate and Rhythm: Normal rate and regular rhythm.      Heart sounds: S1 normal and S2 normal.   Pulmonary:      Effort: Pulmonary effort is normal.      Breath sounds: Normal breath sounds.   Abdominal:      Palpations: Abdomen is soft.   Musculoskeletal:      Right lower leg: No edema.      Left  lower leg: No edema.   Skin:     General: Skin is warm and dry.   Neurological:      General: No focal deficit present.      Mental Status: He is alert and oriented to person, place, and time.   Psychiatric:         Mood and Affect: Mood normal.         Behavior: Behavior normal.          EKG  Encounter Date: 01/15/24   ECG 12 lead (Clinic Performed)    Narrative    Sinus bradycardia at 52/min., otherwise WNL       Problem List Items Addressed This Visit       Coronary artery disease - Primary    Relevant Orders    Follow Up In Cardiology    Heart block    History of coronary artery stent placement    Hypertension, benign    NSVT (nonsustained ventricular tachycardia) (Multi)    Relevant Orders    ECG 12 lead (Clinic Performed)    Obstructive sleep apnea syndrome    PVC's (premature ventricular contractions)    S/P gastric bypass    S/P cervical spinal fusion    Edema         Same meds with recheck EKG 3 months follow up      Davonte Valdez MD

## 2024-05-22 ENCOUNTER — HOSPITAL ENCOUNTER (OUTPATIENT)
Dept: RADIOLOGY | Facility: HOSPITAL | Age: 74
Discharge: HOME | End: 2024-05-22
Payer: MEDICARE

## 2024-05-22 DIAGNOSIS — Z98.1 S/P CERVICAL SPINAL FUSION: ICD-10-CM

## 2024-05-22 PROCEDURE — 72040 X-RAY EXAM NECK SPINE 2-3 VW: CPT

## 2024-05-22 PROCEDURE — 72040 X-RAY EXAM NECK SPINE 2-3 VW: CPT | Performed by: STUDENT IN AN ORGANIZED HEALTH CARE EDUCATION/TRAINING PROGRAM

## 2024-05-31 ENCOUNTER — OFFICE VISIT (OUTPATIENT)
Dept: NEUROSURGERY | Facility: CLINIC | Age: 74
End: 2024-05-31
Payer: MEDICARE

## 2024-05-31 VITALS
WEIGHT: 213.8 LBS | RESPIRATION RATE: 18 BRPM | TEMPERATURE: 97 F | HEIGHT: 67 IN | BODY MASS INDEX: 33.56 KG/M2 | DIASTOLIC BLOOD PRESSURE: 76 MMHG | HEART RATE: 66 BPM | SYSTOLIC BLOOD PRESSURE: 132 MMHG

## 2024-05-31 DIAGNOSIS — G95.89 MYELOMALACIA OF CERVICAL CORD (MULTI): ICD-10-CM

## 2024-05-31 DIAGNOSIS — M48.062 LUMBAR STENOSIS WITH NEUROGENIC CLAUDICATION: ICD-10-CM

## 2024-05-31 DIAGNOSIS — M47.12 CERVICAL SPONDYLOSIS WITH MYELOPATHY: Primary | ICD-10-CM

## 2024-05-31 DIAGNOSIS — M51.04 INTERVERTEBRAL THORACIC DISC DISORDER WITH MYELOPATHY, THORACIC REGION: ICD-10-CM

## 2024-05-31 PROCEDURE — 1159F MED LIST DOCD IN RCRD: CPT | Performed by: NEUROLOGICAL SURGERY

## 2024-05-31 PROCEDURE — 1160F RVW MEDS BY RX/DR IN RCRD: CPT | Performed by: NEUROLOGICAL SURGERY

## 2024-05-31 PROCEDURE — 3075F SYST BP GE 130 - 139MM HG: CPT | Performed by: NEUROLOGICAL SURGERY

## 2024-05-31 PROCEDURE — 3078F DIAST BP <80 MM HG: CPT | Performed by: NEUROLOGICAL SURGERY

## 2024-05-31 PROCEDURE — 1036F TOBACCO NON-USER: CPT | Performed by: NEUROLOGICAL SURGERY

## 2024-05-31 PROCEDURE — 1126F AMNT PAIN NOTED NONE PRSNT: CPT | Performed by: NEUROLOGICAL SURGERY

## 2024-05-31 PROCEDURE — 99024 POSTOP FOLLOW-UP VISIT: CPT | Performed by: NEUROLOGICAL SURGERY

## 2024-05-31 ASSESSMENT — PAIN SCALES - GENERAL: PAINLEVEL: 0-NO PAIN

## 2024-05-31 NOTE — PROGRESS NOTES
[facility] [place]  Department of Neurological Surgery  Post Operative Patient Visit      History of Present Illness:  Misael Lopez is a 73 y.o. year old male who presents to the spine clinic in a post operative visit.     They are status post anterior cervical disc excision with interbody fusion and plate fixation at C3-4, C4-5, C5-6, and C6-7.  He is doing reasonably well.  In his right hand he still has some tingling in his middle finger but it is significantly improved and most of the pain is gone.  He has no neck pain.  He still has his tailbone pain but he understands that with the thoracic compression and the lumbar compression we are going to need to remove all of the compressive lesions before we can hope to see a significant improvement.  He can now carry upwards of 15 to 20 pounds and when he comes back he can be up to 30 to 35 pounds.  I gave him instructions on switching to a soft collar on June 24 and then he will start his wean 2 weeks after that.  We talked about some do's and don'ts as far as activity is concerned and overall I think he is doing remarkably well.  When we see him back at the 3-month visit I am going to ask that we get a CAT scan of his thoracic spine before that visit so we can at least discuss the thoracic decompression and fusion if he continues to make the excellent progress that he has made so far.    14/14 systems reviewed and negative other than what is listed in the history of present illness    Patient Active Problem List   Diagnosis    Abnormal stress test    Backache    Benign prostatic hyperplasia    Bradycardia    Calculus of kidney    Chronic heart failure with preserved ejection fraction (Multi)    Chronic pain of left knee    Coronary artery disease    Depression    Episodic lightheadedness    Fatigue    Gastroesophageal reflux disease without esophagitis    Hearing decreased    Heart block    Heart murmur    History of coronary artery stent  placement    Hyperlipidemia    Hypertension, benign    Degeneration of lumbar or lumbosacral intervertebral disc    Lumbosacral spondylosis    Obesity, unspecified    Myalgia    Angina pectoris (CMS-HCC)    Myocardial infarct (Multi)    Near syncope    NSVT (nonsustained ventricular tachycardia) (Multi)    Obstructive sleep apnea syndrome    Osteoarthritis of carpometacarpal (CMC) joint of left thumb    Pain of left thumb    Pilonidal cyst with abscess    Primary osteoarthritis of left knee    PVC's (premature ventricular contractions)    Severe episode of recurrent major depressive disorder, without psychotic features (Multi)    Suspected COVID-19 virus infection    Thumb joint stiffness    S/P gastric bypass    Myelomalacia of cervical cord (Multi)    Cervical spondylosis with myelopathy    Lumbar stenosis with neurogenic claudication    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    History of bariatric surgery    Paresthesia of upper extremity    S/P cervical spinal fusion    Edema     Past Medical History:   Diagnosis Date    Anxiety     Arthritis     Coronary artery disease     Depression     GERD (gastroesophageal reflux disease)     HL (hearing loss)     Myocardial infarction (Multi)     Nephrolithiasis     Other specified health status     No pertinent past medical history    Sleep apnea      Past Surgical History:   Procedure Laterality Date    BREAST LUMPECTOMY  09/17/2015    Breast Surgery Lumpectomy    BUNIONECTOMY  09/17/2015    Simple Bunion Exostectomy (Silver Procedure)    CARDIAC CATHETERIZATION      CARPAL TUNNEL RELEASE  09/17/2015    Neuroplasty Decompression Median Nerve At Carpal Tunnel    CERVICAL SPINE SURGERY  04/15/2024    ACDFP    COLONOSCOPY      GASTRIC BYPASS  03/31/2016    Gastric Surgery For Morbid Obesity Gastric Bypass    KNEE ARTHROSCOPY W/ DEBRIDEMENT      LITHOTRIPSY  09/17/2015    Renal Lithotripsy    LUMBAR LAMINECTOMY      NASAL SEPTUM SURGERY   09/17/2015    Nasal Septal Deviation Repair    OTHER SURGICAL HISTORY  09/16/2015    Hemilaminectomy    OTHER SURGICAL HISTORY  11/16/2021    Cardiac catheterization    OTHER SURGICAL HISTORY  11/16/2021    Cardiac catheterization with stent placement    SHOULDER SURGERY  09/17/2015    Shoulder Surgery    UPPER GASTROINTESTINAL ENDOSCOPY      VASECTOMY  09/16/2015    Surgery Vas Deferens Vasectomy     Social History     Tobacco Use    Smoking status: Never    Smokeless tobacco: Never   Substance Use Topics    Alcohol use: Yes     Comment: socially     family history includes Coronary artery disease in his father and mother.    Current Outpatient Medications:     acetaminophen (Tylenol) 325 mg tablet, Take 3 tablets (975 mg) by mouth every 8 hours if needed for mild pain (1 - 3) (post op pain)., Disp: , Rfl:     atorvastatin (Lipitor) 20 mg tablet, Take 1 tablet (20 mg) by mouth once daily., Disp: 90 tablet, Rfl: 3    bromocriptine (Parlodel) 5 mg capsule, Take 1 capsule (5 mg) by mouth once daily., Disp: , Rfl:     calcium acetate (Phoslo) 667 mg tablet, Take 1 tablet (667 mg) by mouth once daily., Disp: , Rfl:     calcium polycarbophiL (FiberCon) 625 mg tablet, Take 1 tablet (625 mg) by mouth once daily., Disp: , Rfl:     cyanocobalamin (Vitamin B-12) 100 mcg tablet, Take 1 tablet (100 mcg) by mouth once daily., Disp: , Rfl:     cyclobenzaprine (Flexeril) 10 mg tablet, Take 1 tablet (10 mg) by mouth 3 times a day as needed for muscle spasms (post op pain)., Disp: 30 tablet, Rfl: 1    docusate sodium (Colace) 100 mg capsule, Take 1 capsule (100 mg) by mouth 2 times a day as needed for constipation., Disp: , Rfl:     mirtazapine (Remeron) 15 mg tablet, Take 1 tablet (15 mg) by mouth once daily at bedtime., Disp: , Rfl:     multivitamin with minerals (multivit-min-iron fum-folic ac) tablet, Take 1 tablet by mouth once daily., Disp: , Rfl:     pantoprazole (Protonix) 40 mg packet, Take 1 packet (40 mg) by mouth once  daily in the morning. Take before meals., Disp: , Rfl:     PROzac 20 mg capsule, Take 1 capsule (20 mg) by mouth once daily., Disp: , Rfl:     ranolazine (Ranexa) 500 mg 12 hr tablet, Take 1 tablet (500 mg) by mouth every 12 hours., Disp: 180 tablet, Rfl: 3    tamsulosin (Flomax) 0.4 mg 24 hr capsule, Take 1 capsule (0.4 mg) by mouth once daily., Disp: , Rfl:   Allergies   Allergen Reactions    Nitroglycerin Other     Arrhythmia / Heart Block       Physical Examination:    General: NAD, AOx 3,  no aphasia or dysarthria, normal fund of knowledge  Motor: 5/5 Throughout all extremities,  No drift, no dysmetria on finger to nose  Sensation: SILT and PP throughout all extremities, his incision is well-healed.      Results  I personally reviewed and interpreted the imaging results which included plain x-rays from this week and we reviewed them against his 2 prior sets of x-rays    Assessment/Plan   Misael Lopez is a 73 y.o. year old male who presents to the spine clinic in a post operative visit. Since surgery they are making steady progress and we have discussed waiting until the fusion is setting up solidly or at least giving us a hint that it is setting up solidly before we consider doing either his thoracic or his lumbar decompressions.  He is going to come back and see me in 6 weeks at his 3-month postop visit and I think we can have a CAT scan of his thoracic spine just before that visit.      The above clinical summary has been dictated with voice recognition software. It has not been proofread for grammatical errors, typographical mistakes, or other semantic inconsistencies.    Thank you for visiting our office today. It was our pleasure to take part in your healthcare.     Do not hesitate to call with any questions regarding your plan of care after leaving. My office can be reached at [phone] M-F 8am-4pm.     To clinicians, thank you very much for this kind referral. It is a privilege to partner with you  in the care of your patients. My office would be delighted to assist you with any further consultations or with questions regarding the plan of care outlined. Do not hesitate to call the office or contact me directly.     Sincerely,    Davonte Mauricio MD, FAANS, FACS  Board Certified Neurological Surgeon  , Department of Neurological Surgery  [facility]    [facility]  [address] [place] [address]., [address]  [facility] [address]  [address]  [address] [facility]  [address]  [place] [address]    Phone: [phone]  Fax: [phone]

## 2024-07-01 ENCOUNTER — HOSPITAL ENCOUNTER (OUTPATIENT)
Dept: RADIOLOGY | Facility: HOSPITAL | Age: 74
Discharge: HOME | End: 2024-07-01
Payer: MEDICARE

## 2024-07-01 DIAGNOSIS — M47.12 CERVICAL SPONDYLOSIS WITH MYELOPATHY: ICD-10-CM

## 2024-07-01 DIAGNOSIS — M51.04 INTERVERTEBRAL THORACIC DISC DISORDER WITH MYELOPATHY, THORACIC REGION: ICD-10-CM

## 2024-07-01 PROCEDURE — 72128 CT CHEST SPINE W/O DYE: CPT | Performed by: RADIOLOGY

## 2024-07-01 PROCEDURE — 72128 CT CHEST SPINE W/O DYE: CPT

## 2024-07-01 PROCEDURE — 72040 X-RAY EXAM NECK SPINE 2-3 VW: CPT

## 2024-07-01 PROCEDURE — 72040 X-RAY EXAM NECK SPINE 2-3 VW: CPT | Performed by: RADIOLOGY

## 2024-07-09 ENCOUNTER — APPOINTMENT (OUTPATIENT)
Dept: NEUROSURGERY | Facility: CLINIC | Age: 74
End: 2024-07-09
Payer: MEDICARE

## 2024-07-09 VITALS
HEIGHT: 67 IN | TEMPERATURE: 97.4 F | HEART RATE: 99 BPM | BODY MASS INDEX: 33.49 KG/M2 | RESPIRATION RATE: 16 BRPM | DIASTOLIC BLOOD PRESSURE: 80 MMHG | SYSTOLIC BLOOD PRESSURE: 124 MMHG | WEIGHT: 213.4 LBS

## 2024-07-09 DIAGNOSIS — M51.04 INTERVERTEBRAL THORACIC DISC DISORDER WITH MYELOPATHY, THORACIC REGION: ICD-10-CM

## 2024-07-09 DIAGNOSIS — G95.89 MYELOMALACIA OF CERVICAL CORD (MULTI): Primary | ICD-10-CM

## 2024-07-09 DIAGNOSIS — Z98.1 S/P CERVICAL SPINAL FUSION: ICD-10-CM

## 2024-07-09 PROCEDURE — 3079F DIAST BP 80-89 MM HG: CPT | Performed by: NEUROLOGICAL SURGERY

## 2024-07-09 PROCEDURE — 1159F MED LIST DOCD IN RCRD: CPT | Performed by: NEUROLOGICAL SURGERY

## 2024-07-09 PROCEDURE — 1036F TOBACCO NON-USER: CPT | Performed by: NEUROLOGICAL SURGERY

## 2024-07-09 PROCEDURE — 99024 POSTOP FOLLOW-UP VISIT: CPT | Performed by: NEUROLOGICAL SURGERY

## 2024-07-09 PROCEDURE — 3074F SYST BP LT 130 MM HG: CPT | Performed by: NEUROLOGICAL SURGERY

## 2024-07-09 PROCEDURE — 1125F AMNT PAIN NOTED PAIN PRSNT: CPT | Performed by: NEUROLOGICAL SURGERY

## 2024-07-09 RX ORDER — ASPIRIN 81 MG/1
81 TABLET ORAL DAILY
COMMUNITY

## 2024-07-09 ASSESSMENT — ENCOUNTER SYMPTOMS
DEPRESSION: 0
OCCASIONAL FEELINGS OF UNSTEADINESS: 0
LOSS OF SENSATION IN FEET: 0

## 2024-07-09 ASSESSMENT — PAIN SCALES - GENERAL: PAINLEVEL: 7

## 2024-07-09 ASSESSMENT — PATIENT HEALTH QUESTIONNAIRE - PHQ9
2. FEELING DOWN, DEPRESSED OR HOPELESS: NOT AT ALL
SUM OF ALL RESPONSES TO PHQ9 QUESTIONS 1 AND 2: 0
1. LITTLE INTEREST OR PLEASURE IN DOING THINGS: NOT AT ALL

## 2024-07-09 NOTE — PROGRESS NOTES
[facility] [place]  Department of Neurological Surgery  Post Operative Patient Visit      History of Present Illness:  Misael Lopez is a 73 y.o. year old male who presents to the spine clinic in a post operative visit.     They are status post anterior cervical disc excision with anterior interbody fusion and plate fixation at C3-4, C4-5, C5-6, and C6-7.  He had significant myelomalacia and we needed to stabilize and decompress the spine at this region.  His arm function is still slowly recovering.  His balance is improved but he does still have significant episodes of imbalance.  Much of this is coming from his thoracic cord compression.  We put his repair of his tooth on hold while we allowed him to heal up from his neck operation.  I think it is fine for him to have his implant placed at this time.  Concurrently I am going to have him make appointments with a couple of my colleagues who do thoracic disc excisions and stabilization procedures.  I think that based on their busy schedules and his current plans for having his tooth fixed and then trip to [place] in September would work well for a fall operation on the thoracic spine.  My office will help him make those appointments.    14/14 systems reviewed and negative other than what is listed in the history of present illness    Patient Active Problem List   Diagnosis    Abnormal stress test    Backache    Benign prostatic hyperplasia    Bradycardia    Calculus of kidney    Chronic heart failure with preserved ejection fraction (Multi)    Chronic pain of left knee    Coronary artery disease    Depression    Episodic lightheadedness    Fatigue    Gastroesophageal reflux disease without esophagitis    Hearing decreased    Heart block    Heart murmur    History of coronary artery stent placement    Hyperlipidemia    Hypertension, benign    Degeneration of lumbar or lumbosacral intervertebral disc    Lumbosacral spondylosis    Obesity, unspecified     Myalgia    Angina pectoris (CMS-HCC)    Myocardial infarct (Multi)    Near syncope    NSVT (nonsustained ventricular tachycardia) (Multi)    Obstructive sleep apnea syndrome    Osteoarthritis of carpometacarpal (CMC) joint of left thumb    Pain of left thumb    Pilonidal cyst with abscess    Primary osteoarthritis of left knee    PVC's (premature ventricular contractions)    Severe episode of recurrent major depressive disorder, without psychotic features (Multi)    Suspected COVID-19 virus infection    Thumb joint stiffness    S/P gastric bypass    Myelomalacia of cervical cord (Multi)    Cervical spondylosis with myelopathy    Lumbar stenosis with neurogenic claudication    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    History of bariatric surgery    Paresthesia of upper extremity    S/P cervical spinal fusion    Edema    Intervertebral thoracic disc disorder with myelopathy, thoracic region     Past Medical History:   Diagnosis Date    Anxiety     Arthritis     Coronary artery disease     Depression     GERD (gastroesophageal reflux disease)     HL (hearing loss)     Myocardial infarction (Multi)     Nephrolithiasis     Other specified health status     No pertinent past medical history    Sleep apnea      Past Surgical History:   Procedure Laterality Date    BREAST LUMPECTOMY  09/17/2015    Breast Surgery Lumpectomy    BUNIONECTOMY  09/17/2015    Simple Bunion Exostectomy (Silver Procedure)    CARDIAC CATHETERIZATION      CARPAL TUNNEL RELEASE  09/17/2015    Neuroplasty Decompression Median Nerve At Carpal Tunnel    CERVICAL SPINE SURGERY  04/15/2024    ACDFP    COLONOSCOPY      GASTRIC BYPASS  03/31/2016    Gastric Surgery For Morbid Obesity Gastric Bypass    KNEE ARTHROSCOPY W/ DEBRIDEMENT      LITHOTRIPSY  09/17/2015    Renal Lithotripsy    LUMBAR LAMINECTOMY      NASAL SEPTUM SURGERY  09/17/2015    Nasal Septal Deviation Repair    OTHER SURGICAL HISTORY  09/16/2015     Hemilaminectomy    OTHER SURGICAL HISTORY  11/16/2021    Cardiac catheterization    OTHER SURGICAL HISTORY  11/16/2021    Cardiac catheterization with stent placement    SHOULDER SURGERY  09/17/2015    Shoulder Surgery    UPPER GASTROINTESTINAL ENDOSCOPY      VASECTOMY  09/16/2015    Surgery Vas Deferens Vasectomy     Social History     Tobacco Use    Smoking status: Never     Passive exposure: Never    Smokeless tobacco: Never   Substance Use Topics    Alcohol use: Yes     Comment: socially     family history includes Coronary artery disease in his father and mother.    Current Outpatient Medications:     acetaminophen (Tylenol) 325 mg tablet, Take 3 tablets (975 mg) by mouth every 8 hours if needed for mild pain (1 - 3) (post op pain)., Disp: , Rfl:     aspirin 81 mg EC tablet, Take 1 tablet (81 mg) by mouth once daily., Disp: , Rfl:     atorvastatin (Lipitor) 20 mg tablet, Take 1 tablet (20 mg) by mouth once daily., Disp: 90 tablet, Rfl: 3    bromocriptine (Parlodel) 5 mg capsule, Take 1 capsule (5 mg) by mouth once daily., Disp: , Rfl:     calcium acetate (Phoslo) 667 mg tablet, Take 1 tablet (667 mg) by mouth once daily., Disp: , Rfl:     calcium polycarbophiL (FiberCon) 625 mg tablet, Take 1 tablet (625 mg) by mouth once daily., Disp: , Rfl:     cyanocobalamin (Vitamin B-12) 100 mcg tablet, Take 1 tablet (100 mcg) by mouth once daily., Disp: , Rfl:     cyclobenzaprine (Flexeril) 10 mg tablet, Take 1 tablet (10 mg) by mouth 3 times a day as needed for muscle spasms (post op pain)., Disp: 30 tablet, Rfl: 1    mirtazapine (Remeron) 15 mg tablet, Take 1 tablet (15 mg) by mouth once daily at bedtime., Disp: , Rfl:     multivitamin with minerals (multivit-min-iron fum-folic ac) tablet, Take 1 tablet by mouth once daily., Disp: , Rfl:     pantoprazole (Protonix) 40 mg packet, Take 1 packet (40 mg) by mouth once daily in the morning. Take before meals., Disp: , Rfl:     PROzac 20 mg capsule, Take 1 capsule (20 mg) by  mouth once daily., Disp: , Rfl:     ranolazine (Ranexa) 500 mg 12 hr tablet, Take 1 tablet (500 mg) by mouth every 12 hours., Disp: 180 tablet, Rfl: 3    tamsulosin (Flomax) 0.4 mg 24 hr capsule, Take 1 capsule (0.4 mg) by mouth once daily., Disp: , Rfl:   Allergies   Allergen Reactions    Nitroglycerin Other     Arrhythmia / Heart Block       Physical Examination:    The neck incision is well-healed.  His arm strength and hand strength seems to be full.  He still complains of the tingling in the fingers.  He is walking more erect but he does not feel his balance is that much improved.    Results  I personally reviewed and interpreted the imaging results which included we went over the plain x-rays that were done earlier in the month as well as his thoracic CAT scan which was done at the same time.  The disc herniation at T8-9 is completely calcified.  I told him that I no longer do this type of work but that I thought that he would need to have an instrumented fusion in addition to the decompression.    Assessment/Plan   Misael Lopez is a 73 y.o. year old male who presents to the spine clinic in a post operative visit. Since surgery they are doing quite well from the cervical operation and the construct looks like it is healing extremely well.  We will go ahead and make appointments for him to see some of my colleagues and he will continue to get his dental work fixed.  And I will see him back in 4 to 6 months with another set of x-rays just to make certain that his cervical spine is healing well.      The above clinical summary has been dictated with voice recognition software. It has not been proofread for grammatical errors, typographical mistakes, or other semantic inconsistencies.    Thank you for visiting our office today. It was our pleasure to take part in your healthcare.     Do not hesitate to call with any questions regarding your plan of care after leaving. My office can be reached at [phone]  [place] 8am-4pm.     To clinicians, thank you very much for this kind referral. It is a privilege to partner with you in the care of your patients. My office would be delighted to assist you with any further consultations or with questions regarding the plan of care outlined. Do not hesitate to call the office or contact me directly.     Sincerely,    Davonte Mauricio MD, FAANS, FACS  Board Certified Neurological Surgeon  , Department of Neurological Surgery  [facility]    [facility]  [address]  [facility] [address]  [address]  [address] [facility]  [address]  [place] [address]    Phone: [phone]  Fax: [phone]

## 2024-07-12 ENCOUNTER — APPOINTMENT (OUTPATIENT)
Dept: NEUROSURGERY | Facility: CLINIC | Age: 74
End: 2024-07-12
Payer: MEDICARE

## 2024-07-19 ENCOUNTER — TELEPHONE (OUTPATIENT)
Dept: NEUROSURGERY | Facility: CLINIC | Age: 74
End: 2024-07-19
Payer: MEDICARE

## 2024-07-19 DIAGNOSIS — Z98.1 S/P CERVICAL SPINAL FUSION: Primary | ICD-10-CM

## 2024-07-19 RX ORDER — AMOXICILLIN 500 MG/1
2000 CAPSULE ORAL ONCE
Qty: 4 CAPSULE | Refills: 0 | Status: SHIPPED | OUTPATIENT
Start: 2024-07-19 | End: 2024-07-19

## 2024-07-25 PROBLEM — Z20.822 SUSPECTED COVID-19 VIRUS INFECTION: Status: RESOLVED | Noted: 2024-01-03 | Resolved: 2024-07-25

## 2024-08-01 NOTE — PROGRESS NOTES
Misael Lopez is a 74 y.o. year old male p/w Thoracic myelopathy with lumbar radiculopathy.    14/14 systems reviewed and negative other than what is listed in the history of present illness        Past Medical History:   Diagnosis Date    Anxiety     Arthritis     Coronary artery disease     Depression     GERD (gastroesophageal reflux disease)     HL (hearing loss)     Myocardial infarction (Multi)     Nephrolithiasis     Other specified health status     No pertinent past medical history    Sleep apnea        Past Surgical History:   Procedure Laterality Date    BREAST LUMPECTOMY  09/17/2015    Breast Surgery Lumpectomy    BUNIONECTOMY  09/17/2015    Simple Bunion Exostectomy (Silver Procedure)    CARDIAC CATHETERIZATION      CARPAL TUNNEL RELEASE  09/17/2015    Neuroplasty Decompression Median Nerve At Carpal Tunnel    CERVICAL SPINE SURGERY  04/15/2024    ACDFP    COLONOSCOPY      GASTRIC BYPASS  03/31/2016    Gastric Surgery For Morbid Obesity Gastric Bypass    KNEE ARTHROSCOPY W/ DEBRIDEMENT      LITHOTRIPSY  09/17/2015    Renal Lithotripsy    LUMBAR LAMINECTOMY      NASAL SEPTUM SURGERY  09/17/2015    Nasal Septal Deviation Repair    OTHER SURGICAL HISTORY  09/16/2015    Hemilaminectomy    OTHER SURGICAL HISTORY  11/16/2021    Cardiac catheterization    OTHER SURGICAL HISTORY  11/16/2021    Cardiac catheterization with stent placement    SHOULDER SURGERY  09/17/2015    Shoulder Surgery    UPPER GASTROINTESTINAL ENDOSCOPY      VASECTOMY  09/16/2015    Surgery Vas Deferens Vasectomy           Current Outpatient Medications:     acetaminophen (Tylenol) 325 mg tablet, Take 3 tablets (975 mg) by mouth every 8 hours if needed for mild pain (1 - 3) (post op pain)., Disp: , Rfl:     aspirin 81 mg EC tablet, Take 1 tablet (81 mg) by mouth once daily., Disp: , Rfl:     atorvastatin (Lipitor) 20 mg tablet, Take 1 tablet (20 mg) by mouth once daily., Disp: 90 tablet, Rfl: 3    bromocriptine (Parlodel) 5 mg capsule, Take  1 capsule (5 mg) by mouth once daily., Disp: , Rfl:     calcium acetate (Phoslo) 667 mg tablet, Take 1 tablet (667 mg) by mouth once daily., Disp: , Rfl:     calcium polycarbophiL (FiberCon) 625 mg tablet, Take 1 tablet (625 mg) by mouth once daily., Disp: , Rfl:     cyanocobalamin (Vitamin B-12) 100 mcg tablet, Take 1 tablet (100 mcg) by mouth once daily., Disp: , Rfl:     cyclobenzaprine (Flexeril) 10 mg tablet, Take 1 tablet (10 mg) by mouth 3 times a day as needed for muscle spasms (post op pain)., Disp: 30 tablet, Rfl: 1    mirtazapine (Remeron) 15 mg tablet, Take 1 tablet (15 mg) by mouth once daily at bedtime., Disp: , Rfl:     multivitamin with minerals (multivit-min-iron fum-folic ac) tablet, Take 1 tablet by mouth once daily., Disp: , Rfl:     pantoprazole (Protonix) 40 mg packet, Take 1 packet (40 mg) by mouth once daily in the morning. Take before meals., Disp: , Rfl:     PROzac 20 mg capsule, Take 1 capsule (20 mg) by mouth once daily., Disp: , Rfl:     ranolazine (Ranexa) 500 mg 12 hr tablet, Take 1 tablet (500 mg) by mouth every 12 hours., Disp: 180 tablet, Rfl: 3    tamsulosin (Flomax) 0.4 mg 24 hr capsule, Take 1 capsule (0.4 mg) by mouth once daily., Disp: , Rfl:       Vitals:    08/09/24 0921   BP: 110/60   Pulse: 65     Objective   General: Well developed, awake/alert/oriented x3, no distress, alert and cooperative  Skin: Warm and dry, no lesions, no rashes  ENMT: Mucous membranes moist, no apparent injury, no lesions seen  Head/Neck: Neck Supple, no apparent injury  Respiratory/Thorax: Normal breath sounds with good chest expansion, thorax symmetric  Cardiovascular: No pitting edema, no JVD    Motor Strength: 5/5 Throughout all extremities    Muscle Bulk: Normal and symmetric in all extremities    Posture:   -- Cervical: Normal  -- Thoracic: Normal  -- Lumbar : Normal  Paraspinal muscle spasm/tenderness absent.     Sensation: intact to light touch     Relevant Results    needs EOS CT T-spine:  T8-9 calcified disc herniation with right eccentricity  needs CT L-spine MRI T-spine: T8-9 severe compression due to disc herniation with cord signal change  MRI L-spine: L1-2, L3-4, L4-5 severe stenosis        Problem List Items Addressed This Visit    None         Assessment/Plan       Mr. Misael Lopez is a very nice 74 y.o. year old male presenting today with back pain that is radiating to his right lower extremity. He also has balance issues, which is his major complaint. He denies weakness/ numbness/ perianal anesthesia/bowel and bladder incontinence.     Patient mentions having cardiac disease for which he has stent more than 10 years ago and he is on baby aspirin for this.     We reviewed Mri scans together today showing a T8-9 calcified lesion causing spinal cord compression with cord signal change. He also has L1-2, L3-5 stenosis in the lumbar spine. On examination he is moving all four limbs with full strength and has intact neurological examination.     In view of his symptoms, I believe surgery will be the best choice at this time. I have offered him thoracic T9 transpedicular decompression with T7-11 fusion and a lumbar laminectomy L1-2, L3-5. We discussed the surgical options, benefits and risks which includes anesthesia risks, infection, spinal fluid leakage, bleeding, hematoma and injury to blood vessel or nerve in and around the spinal cord region. He will need cardiology clearance before surgery. I have sent a prescription for Gabapentin to see if this will help with lumbar radiculopathy in the interim.    I have ordered CT scan to assess prior lumbar laminectomy defect. I have ordered EOS X-rays from head to foot to assess global alignment.     He will be scheduled for a surgery in October.     Radha Ching MD    of Neurosurgery   [facility] [place]   [facility]   Office: [phone]  Fax: [phone]      Scribe Attestation  By  signing my name below, I, Sharmila Oleary   attest that this documentation has been prepared under the direction and in the presence of Radha Ching MD.

## 2024-08-09 ENCOUNTER — APPOINTMENT (OUTPATIENT)
Dept: NEUROSURGERY | Facility: CLINIC | Age: 74
End: 2024-08-09
Payer: MEDICARE

## 2024-08-09 VITALS
HEART RATE: 65 BPM | WEIGHT: 215.4 LBS | DIASTOLIC BLOOD PRESSURE: 60 MMHG | HEIGHT: 67 IN | SYSTOLIC BLOOD PRESSURE: 110 MMHG | BODY MASS INDEX: 33.81 KG/M2

## 2024-08-09 DIAGNOSIS — M51.04 INTERVERTEBRAL THORACIC DISC DISORDER WITH MYELOPATHY, THORACIC REGION: ICD-10-CM

## 2024-08-09 DIAGNOSIS — M48.062 LUMBAR STENOSIS WITH NEUROGENIC CLAUDICATION: Primary | ICD-10-CM

## 2024-08-09 PROCEDURE — 1125F AMNT PAIN NOTED PAIN PRSNT: CPT | Performed by: STUDENT IN AN ORGANIZED HEALTH CARE EDUCATION/TRAINING PROGRAM

## 2024-08-09 PROCEDURE — 1036F TOBACCO NON-USER: CPT | Performed by: STUDENT IN AN ORGANIZED HEALTH CARE EDUCATION/TRAINING PROGRAM

## 2024-08-09 PROCEDURE — 3074F SYST BP LT 130 MM HG: CPT | Performed by: STUDENT IN AN ORGANIZED HEALTH CARE EDUCATION/TRAINING PROGRAM

## 2024-08-09 PROCEDURE — 99215 OFFICE O/P EST HI 40 MIN: CPT | Performed by: STUDENT IN AN ORGANIZED HEALTH CARE EDUCATION/TRAINING PROGRAM

## 2024-08-09 PROCEDURE — 1159F MED LIST DOCD IN RCRD: CPT | Performed by: STUDENT IN AN ORGANIZED HEALTH CARE EDUCATION/TRAINING PROGRAM

## 2024-08-09 PROCEDURE — 3008F BODY MASS INDEX DOCD: CPT | Performed by: STUDENT IN AN ORGANIZED HEALTH CARE EDUCATION/TRAINING PROGRAM

## 2024-08-09 PROCEDURE — 3078F DIAST BP <80 MM HG: CPT | Performed by: STUDENT IN AN ORGANIZED HEALTH CARE EDUCATION/TRAINING PROGRAM

## 2024-08-09 RX ORDER — GABAPENTIN 100 MG/1
100 CAPSULE ORAL 3 TIMES DAILY
Qty: 90 CAPSULE | Refills: 0 | Status: SHIPPED | OUTPATIENT
Start: 2024-08-09 | End: 2024-09-08

## 2024-08-09 ASSESSMENT — PATIENT HEALTH QUESTIONNAIRE - PHQ9
SUM OF ALL RESPONSES TO PHQ9 QUESTIONS 1 AND 2: 0
1. LITTLE INTEREST OR PLEASURE IN DOING THINGS: NOT AT ALL
2. FEELING DOWN, DEPRESSED OR HOPELESS: NOT AT ALL

## 2024-08-09 ASSESSMENT — PAIN SCALES - GENERAL: PAINLEVEL: 6

## 2024-08-14 ENCOUNTER — APPOINTMENT (OUTPATIENT)
Dept: CARDIOLOGY | Facility: CLINIC | Age: 74
End: 2024-08-14
Payer: MEDICARE

## 2024-08-14 VITALS
OXYGEN SATURATION: 96 % | BODY MASS INDEX: 33.59 KG/M2 | WEIGHT: 214 LBS | HEIGHT: 67 IN | SYSTOLIC BLOOD PRESSURE: 120 MMHG | DIASTOLIC BLOOD PRESSURE: 72 MMHG | HEART RATE: 59 BPM

## 2024-08-14 DIAGNOSIS — Z95.5 HISTORY OF CORONARY ARTERY STENT PLACEMENT: ICD-10-CM

## 2024-08-14 DIAGNOSIS — I25.10 CORONARY ARTERY DISEASE INVOLVING NATIVE CORONARY ARTERY OF NATIVE HEART WITHOUT ANGINA PECTORIS: Primary | ICD-10-CM

## 2024-08-14 DIAGNOSIS — E78.00 PURE HYPERCHOLESTEROLEMIA: ICD-10-CM

## 2024-08-14 DIAGNOSIS — Z98.84 S/P GASTRIC BYPASS: ICD-10-CM

## 2024-08-14 DIAGNOSIS — I47.29 NSVT (NONSUSTAINED VENTRICULAR TACHYCARDIA) (MULTI): ICD-10-CM

## 2024-08-14 DIAGNOSIS — Z98.1 S/P CERVICAL SPINAL FUSION: ICD-10-CM

## 2024-08-14 DIAGNOSIS — E66.9 CLASS 1 OBESITY WITHOUT SERIOUS COMORBIDITY IN ADULT, UNSPECIFIED BMI, UNSPECIFIED OBESITY TYPE: ICD-10-CM

## 2024-08-14 DIAGNOSIS — I10 HYPERTENSION, BENIGN: ICD-10-CM

## 2024-08-14 PROCEDURE — 1036F TOBACCO NON-USER: CPT | Performed by: INTERNAL MEDICINE

## 2024-08-14 PROCEDURE — 3074F SYST BP LT 130 MM HG: CPT | Performed by: INTERNAL MEDICINE

## 2024-08-14 PROCEDURE — 99214 OFFICE O/P EST MOD 30 MIN: CPT | Performed by: INTERNAL MEDICINE

## 2024-08-14 PROCEDURE — 1159F MED LIST DOCD IN RCRD: CPT | Performed by: INTERNAL MEDICINE

## 2024-08-14 PROCEDURE — 93010 ELECTROCARDIOGRAM REPORT: CPT | Performed by: INTERNAL MEDICINE

## 2024-08-14 PROCEDURE — 93005 ELECTROCARDIOGRAM TRACING: CPT | Performed by: INTERNAL MEDICINE

## 2024-08-14 PROCEDURE — 3078F DIAST BP <80 MM HG: CPT | Performed by: INTERNAL MEDICINE

## 2024-08-14 PROCEDURE — 3008F BODY MASS INDEX DOCD: CPT | Performed by: INTERNAL MEDICINE

## 2024-08-14 PROCEDURE — 1160F RVW MEDS BY RX/DR IN RCRD: CPT | Performed by: INTERNAL MEDICINE

## 2024-08-14 NOTE — PROGRESS NOTES
"  Subjective  Misael Lopez  is a 74 y.o. year old male who presents for F/U ASHD.  No chest pain, no dyspnea, no palpitations, noedeam    Blood pressure 120/72, pulse 59, height 1.702 m (5' 7\"), weight 97.1 kg (214 lb), SpO2 96%.   Nitroglycerin  Past Medical History:   Diagnosis Date    Anxiety     Arthritis     Coronary artery disease     Depression     GERD (gastroesophageal reflux disease)     HL (hearing loss)     Myocardial infarction (Multi)     Nephrolithiasis     Other specified health status     No pertinent past medical history    Sleep apnea      Past Surgical History:   Procedure Laterality Date    BREAST LUMPECTOMY  09/17/2015    Breast Surgery Lumpectomy    BUNIONECTOMY  09/17/2015    Simple Bunion Exostectomy (Silver Procedure)    CARDIAC CATHETERIZATION      CARPAL TUNNEL RELEASE  09/17/2015    Neuroplasty Decompression Median Nerve At Carpal Tunnel    CERVICAL SPINE SURGERY  04/15/2024    ACDFP    COLONOSCOPY      GASTRIC BYPASS  03/31/2016    Gastric Surgery For Morbid Obesity Gastric Bypass    KNEE ARTHROSCOPY W/ DEBRIDEMENT      LITHOTRIPSY  09/17/2015    Renal Lithotripsy    LUMBAR LAMINECTOMY      NASAL SEPTUM SURGERY  09/17/2015    Nasal Septal Deviation Repair    OTHER SURGICAL HISTORY  09/16/2015    Hemilaminectomy    OTHER SURGICAL HISTORY  11/16/2021    Cardiac catheterization    OTHER SURGICAL HISTORY  11/16/2021    Cardiac catheterization with stent placement    SHOULDER SURGERY  09/17/2015    Shoulder Surgery    UPPER GASTROINTESTINAL ENDOSCOPY      VASECTOMY  09/16/2015    Surgery Vas Deferens Vasectomy     Family History   Problem Relation Name Age of Onset    Coronary artery disease Mother      Coronary artery disease Father       @SOC    Current Outpatient Medications   Medication Sig Dispense Refill    acetaminophen (Tylenol) 325 mg tablet Take 3 tablets (975 mg) by mouth every 8 hours if needed for mild pain (1 - 3) (post op pain).      aspirin 81 mg EC tablet Take 1 tablet (81 " mg) by mouth once daily.      atorvastatin (Lipitor) 20 mg tablet Take 1 tablet (20 mg) by mouth once daily. 90 tablet 3    bromocriptine (Parlodel) 5 mg capsule Take 1 capsule (5 mg) by mouth once daily.      calcium acetate (Phoslo) 667 mg tablet Take 1 tablet (667 mg) by mouth once daily.      calcium polycarbophiL (FiberCon) 625 mg tablet Take 1 tablet (625 mg) by mouth once daily.      cyanocobalamin (Vitamin B-12) 100 mcg tablet Take 1 tablet (100 mcg) by mouth once daily.      cyclobenzaprine (Flexeril) 10 mg tablet Take 1 tablet (10 mg) by mouth 3 times a day as needed for muscle spasms (post op pain). 30 tablet 1    gabapentin (Neurontin) 100 mg capsule Take 1 capsule (100 mg) by mouth 3 times a day. 90 capsule 0    mirtazapine (Remeron) 15 mg tablet Take 1 tablet (15 mg) by mouth once daily at bedtime.      multivitamin with minerals (multivit-min-iron fum-folic ac) tablet Take 1 tablet by mouth once daily.      pantoprazole (Protonix) 40 mg packet Take 1 packet (40 mg) by mouth once daily in the morning. Take before meals.      PROzac 20 mg capsule Take 1 capsule (20 mg) by mouth once daily.      ranolazine (Ranexa) 500 mg 12 hr tablet Take 1 tablet (500 mg) by mouth every 12 hours. 180 tablet 3    tamsulosin (Flomax) 0.4 mg 24 hr capsule Take 1 capsule (0.4 mg) by mouth once daily.       No current facility-administered medications for this visit.        ROS  Review of Systems   All other systems reviewed and are negative.      Physical Exam  Physical Exam  Constitutional:       Appearance: Normal appearance.   HENT:      Head: Normocephalic and atraumatic.   Cardiovascular:      Rate and Rhythm: Normal rate and regular rhythm.      Heart sounds: Murmur heard.      Crescendo decrescendo systolic murmur is present with a grade of 2/6.      Crescendo decrescendo diastolic murmur is present.   Musculoskeletal:      Right lower leg: No edema.      Left lower leg: No edema.   Skin:     General: Skin is warm  and dry.   Neurological:      General: No focal deficit present.      Mental Status: He is alert and oriented to person, place, and time.   Psychiatric:         Mood and Affect: Mood normal.         Behavior: Behavior normal.          EKG  Encounter Date: 08/14/24   ECG 12 lead (Clinic Performed)    Narrative    NSR at 62/min., WNL       Problem List Items Addressed This Visit       Coronary artery disease - Primary    Relevant Orders    ECG 12 lead (Clinic Performed) (Completed)    History of coronary artery stent placement    Hyperlipidemia    Hypertension, benign    Obesity, unspecified    NSVT (nonsustained ventricular tachycardia) (Multi)    S/P gastric bypass    S/P cervical spinal fusion         Same meds with EKG 3 months      Davonte Valdez MD

## 2024-08-23 DIAGNOSIS — M51.04 INTERVERTEBRAL THORACIC DISC DISORDER WITH MYELOPATHY, THORACIC REGION: ICD-10-CM

## 2024-08-23 DIAGNOSIS — R79.1 ABNORMAL COAGULATION PROFILE: ICD-10-CM

## 2024-08-23 DIAGNOSIS — N39.0 URINARY TRACT INFECTION WITHOUT HEMATURIA, SITE UNSPECIFIED: ICD-10-CM

## 2024-08-23 DIAGNOSIS — M48.062 LUMBAR STENOSIS WITH NEUROGENIC CLAUDICATION: Primary | ICD-10-CM

## 2024-08-23 RX ORDER — CEFAZOLIN SODIUM 2 G/100ML
2 INJECTION, SOLUTION INTRAVENOUS ONCE
OUTPATIENT
Start: 2024-08-23 | End: 2024-08-23

## 2024-08-23 RX ORDER — SODIUM CHLORIDE, SODIUM LACTATE, POTASSIUM CHLORIDE, CALCIUM CHLORIDE 600; 310; 30; 20 MG/100ML; MG/100ML; MG/100ML; MG/100ML
20 INJECTION, SOLUTION INTRAVENOUS CONTINUOUS
OUTPATIENT
Start: 2024-08-23

## 2024-08-28 DIAGNOSIS — M48.062 LUMBAR STENOSIS WITH NEUROGENIC CLAUDICATION: Primary | ICD-10-CM

## 2024-09-05 ENCOUNTER — HOSPITAL ENCOUNTER (OUTPATIENT)
Dept: RADIOLOGY | Facility: HOSPITAL | Age: 74
Discharge: HOME | End: 2024-09-05
Payer: MEDICARE

## 2024-09-05 DIAGNOSIS — M48.062 LUMBAR STENOSIS WITH NEUROGENIC CLAUDICATION: ICD-10-CM

## 2024-09-05 PROCEDURE — 72131 CT LUMBAR SPINE W/O DYE: CPT

## 2024-09-06 DIAGNOSIS — M48.062 LUMBAR STENOSIS WITH NEUROGENIC CLAUDICATION: ICD-10-CM

## 2024-09-06 DIAGNOSIS — G89.18 ACUTE POST-OPERATIVE PAIN: ICD-10-CM

## 2024-09-06 RX ORDER — CYCLOBENZAPRINE HCL 10 MG
10 TABLET ORAL 3 TIMES DAILY PRN
Qty: 30 TABLET | Refills: 1 | Status: SHIPPED | OUTPATIENT
Start: 2024-09-06

## 2024-09-06 RX ORDER — GABAPENTIN 100 MG/1
100 CAPSULE ORAL 3 TIMES DAILY
Qty: 90 CAPSULE | Refills: 0 | Status: SHIPPED | OUTPATIENT
Start: 2024-09-06 | End: 2024-10-06

## 2024-09-09 ENCOUNTER — EVALUATION (OUTPATIENT)
Dept: PHYSICAL THERAPY | Facility: CLINIC | Age: 74
End: 2024-09-09
Payer: MEDICARE

## 2024-09-09 DIAGNOSIS — M48.062 LUMBAR STENOSIS WITH NEUROGENIC CLAUDICATION: ICD-10-CM

## 2024-09-09 PROCEDURE — 97110 THERAPEUTIC EXERCISES: CPT | Mod: GP

## 2024-09-09 PROCEDURE — 97161 PT EVAL LOW COMPLEX 20 MIN: CPT | Mod: GP

## 2024-09-09 ASSESSMENT — ENCOUNTER SYMPTOMS
OCCASIONAL FEELINGS OF UNSTEADINESS: 1
LOSS OF SENSATION IN FEET: 1
DEPRESSION: 0

## 2024-09-09 NOTE — PROGRESS NOTES
Physical Therapy    Physical Therapy Evaluation and Treatment      Patient Name: Misael Lopez  MRN: [id number]  Today's Date: 9/9/2024  Time Calculation  Start Time: 1010  Stop Time: 1050  Time Calculation (min): 40 min    Insurance - reviewed   Visit number: 1 (updated 09/09/24)   Payor: [employer] / Plan: [employer] [place] [employer] / Product Type: *No Product type* /    No auth needed      Referring Provider: Radha Ching MD   Surgery: C3-4, C4-5, C5-6, C6-7 Anterior Disc Excisions/ Allograft Fusion & Plate Fixation C3-7 With Flat Plate Cervical X-ray, 4/15/2024.  Days since surgery: 147       Assessment:      Misael Lopez  is a 74 y.o. old patient who participated in a physical therapy evaluation today due to mid and low back pain pre-surgery. Misael presents with signs and symptoms consistent with degenerative changes in the spine with herniated discs in the lumbar spine and thoracic stenosis. Misael's impairments include: balance deficits, gait deficits, postural deficits, pain, and decreased functional mobility.   Due to these impairments, he has the following functional limitations and participation restrictions:  increased fall risk, difficulty with ambulation, difficulty with stair negotiation, difficulty performing transfers, difficulty performing recreational activities, and increased time to complete ADLs/IADLs. Misael's clinical presentation is Stable and/or uncomplicated characteristics with the level of complexity being low. Misael's potential for rehab is fair.  Skilled physical therapy services are appropriate and beneficial in order to achieve measurable and meaningful change in the objective tests and measures. Utilization of skilled physical therapy services will aid in advancing his functional status and attaining his therapy-related goals. Misael verbalized understanding and is in agreement with all goals and plan of care.  Misael left session with all questions answered and no increase in symptoms.       Plan:  Continue with core and hip strengthening, assess repeated extensions       Planned Interventions: Therapeutic Exercise, Therapeutic Activity, Manual Therapy, Neuromuscular Re-Education, E-stim, Ultrasound, Aquatic Therapy   Frequency and Duration: 1x/week for 5 weeks    Plan of Care Agreement: Pt had input in and is agreeable to POC.     Current Problem:   Problem List Items Addressed This Visit             ICD-10-CM    Lumbar stenosis with neurogenic claudication M48.062    Relevant Orders    Follow Up In Physical Therapy         Subjective    PT reports that he has thoracic and lumbar surgery scheduled for October 9th. Procedure planned is T9 decompression, T7-11 fusion, L1-2 and L3-5 laminectomy. There is a potential to hold off on lumbar procedure depending on how PT goes. Physician suggested PT before surgery to see if there are any changes in symptoms. Imaging shows thoracic spondylosis and stenosis and cord compression at T8-9. Also shows herniated discs throughout the lumbar spine. Reports that he does not like to sit for extended periods of time, would prefer to stand or walk. Sleeps on his back without support under the legs. Occasionally will feel unsteady when walking and has fallen in the past. Has grab rails in the house. Ascends and descends stairs one at a time. Reports shooting pain down both legs, more often and more severe on the R.     General:  General  Reason for Referral: mid and low back pain pre surgery  Referred By: XAVI Ching  is a 74 y.o. male  presenting to the clinic with chief complaint of mid and low back pain.     Misael Lopez  reports symptoms began in 2022 and started to progress.     Reports symptoms are better with standing, walking, ice.     Reports symptoms are worse with sitting, bending, squatting, lifting, stairs.     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   Past Medical history: other known cardiac problems- previous MI, has stents     Pain:  6/10 (varies from a 6-9/10)  pain location: mid and low back      Pain description: dull, achy pain       Objective Measures:   Objective   Posture: forward head, rounded shoulders     Gait: left leaning, mild trendelenburg     Squat: knee strategy     Balance: tandem- 30s with R fwd, 20s with L fwd (more consistent loss of balance)    ROM: (WFL unless otherwise noted)   Trunk flexion: mild limitation   Trunk extension: normal   R hip flexion:   L hip flexion:   R hip abduction:   L hip abduction:   R hip extension:   L hip extension:     R knee flexion:   L knee flexion:  R knee extension:  L knee extension:     MMT:  R hip flexion: 5  L hip flexion: 5  R hip abduction: 5  L hip abduction: 5  R hip extension: 5  L hip extension: 5    R knee flexion: 5  L knee flexion: 5  R knee extension: 5  L knee extension: 5    Repeated measures: 10x  Flexion: pain behind legs B  Extension: continued pain behind legs B  - did trial again later and felt good with prone repeated extensions     Special Tests:  Slump (+) on R     Tenderness: R glute med, piriformis     Joint Play: stiff throughout lumbar spine         Outcome Measures:  Other Measures  Oswestry Disablity Index (ANCELMO): 12     Treatments: * indicated new  exercises for HEP  Access Code [email or web address]    Therapeutic Exercise:  10 minutes    - prone press ups x10*  - prone on elbows x3 min*  - pelvic tilt, core activation 5s hold x 10*        EDUCATION:     -Educated Misael  on the role of PT and PT POC  -Educated Misael regarding benefit and purpose of skilled PT services along with results of examination findings and how this correlates to their chief complaint.   -Answered Misael's questions in full.  -Educated Misael on relevant anatomy and the rationale for exercises  -Misael Lopez advised to hold any ex if it increases pain, Misael Lopez verbalized understanding    Goals:    STG's (within 2 weeks)  Misael Lopez will decrease pain by >/= 2/10 points from baseline to improve ability to perform household/recreational activities.    Misael Lopez will be able to sleep through the night with less than 2 interruptions due to pain in order to improve mental and physical well-being in order to safely participate in ADLs.     Misael Lopez will demonstrate independence,  excellent understanding of and report compliance with at least 3 exercises in his HEP to facilitate the learning process to maximize PT benefits and to facilitate independent rehab program upon discharge.    LTG's (by discharge)    Misael will demonstrate ascending and descending >/= 12 step with alternating pattern and no pain.    Misael will demonstrate ambulating >/=1000 ft with proper body mechanics and gait pattern and no reports of pain.      Misael Lopez will decrease pain to 0-2/10 to improve ability to perform household/recreational activities.    Misael Lopez will be able to sleep through the night without waking due to pain in order to improve mental and physical well-being in order to safely participate in ADLs.     Misael Lopez will demonstrate independence,  excellent understanding of and report compliance with HEP to facilitate the learning process to maximize PT benefits and to facilitate independent  rehab program upon discharge.    Time Calculation  Start Time: 1010  Stop Time: 1050  Time Calculation (min): 40 min  PT Evaluation Time Entry  PT Evaluation (Low) Time Entry: 30  PT Therapeutic Procedures Time Entry  Therapeutic Exercise Time Entry: 10

## 2024-09-12 ENCOUNTER — CLINICAL SUPPORT (OUTPATIENT)
Dept: PREADMISSION TESTING | Facility: HOSPITAL | Age: 74
End: 2024-09-12
Payer: MEDICARE

## 2024-09-12 ENCOUNTER — CLINICAL SUPPORT (OUTPATIENT)
Dept: PHYSICAL THERAPY | Facility: CLINIC | Age: 74
End: 2024-09-12
Payer: MEDICARE

## 2024-09-12 DIAGNOSIS — M48.062 LUMBAR STENOSIS WITH NEUROGENIC CLAUDICATION: ICD-10-CM

## 2024-09-12 PROCEDURE — 97113 AQUATIC THERAPY/EXERCISES: CPT | Mod: GP

## 2024-09-12 NOTE — PROGRESS NOTES
PHYSICAL THERAPY   TREATMENT NOTE    Patient Name:  Misael Lopez   Patient MRN: [id number]  Date: 9/12/2024    Time Calculation  Start Time: 1440  Stop Time: 1525  Time Calculation (min): 45 min    Insurance:  Payor: [employer] / Plan: [employer] [place] [employer] / Product Type: *No Product type* /    No auth needed    Visit number: 2      General   Referring Provider: Radha Ching MD   Surgery: C3-4, C4-5, C5-6, C6-7 Anterior Disc Excisions/ Allograft Fusion & Plate Fixation C3-7 With Flat Plate Cervical X-ray, 4/15/2024.  Days since surgery: 147     Therapy diagnoses:   Problem List Items Addressed This Visit             ICD-10-CM    Lumbar stenosis with neurogenic claudication M48.062       Assessment:  Able to complete all exercises, with verbal cues for core engagement. Fatigued with exercises. Decreased pain following deep water activities.     Plan: Continue with progression of aquatic / core strengthening activities without difficulty.       Subjective  Reports that he would like to complete his therapy in the pool. He notes that his back is sore today and he has pain in his right buttock.   - Pain (0-10): 7/10     Precautions  Fall Risk: High   Denies: Cancer, Pacemaker, Diabetes, Hypertension, and other known pulmonary problems  Past Surgical history: L knee and shoulder arthroscopic procedures, cervical anterior disc excisions and plates in April 2024, lumbar disc laminectomy in 2009.   Past Medical history: other known cardiac problems- previous MI, has stents     Objective    Treatment Performed:   Aquatic Therapy:  Chest height water  walking forward / backwards   Side stepping   Marching forward / backwards   Standing hip flexion, abduction, extension   Squats  Rows purple dumbells  Shoulder abd / add   Shoulder horizitonal abd / add  Shoulder flexoni     Deep water  Noodle under arms  Bicycle  Hip abd / add  Skiier        PT Therapeutic Procedures Time Entry  Aquatic Therapy Time Entry: 45

## 2024-09-12 NOTE — CPM/PAT NURSE NOTE
CPM/PAT Nurse Note      Name: Misael Lopez (Misael Lopez)  /Age: 1950/74 y.o.       Past Medical History:   Diagnosis Date    Anxiety     Arthritis     ASHD (arteriosclerotic heart disease)     Coronary artery disease     Depression     GERD (gastroesophageal reflux disease)     HL (hearing loss)     Hyperlipidemia     Hypertension     Lumbar radiculopathy     Myocardial infarction (Multi)     Nephrolithiasis     NSVT (nonsustained ventricular tachycardia) (Multi)     Obesity     S/P gastric bypass    Sleep apnea     Spinal stenosis     Thoracic myelopathy        Past Surgical History:   Procedure Laterality Date    BREAST LUMPECTOMY      BUNIONECTOMY      CARDIAC CATHETERIZATION  2020    CARPAL TUNNEL RELEASE      CERVICAL SPINE SURGERY  04/15/2024    ACDFP    COLONOSCOPY      GASTRIC BYPASS      KNEE ARTHROSCOPY W/ DEBRIDEMENT      LITHOTRIPSY      Renal Lithotripsy    LUMBAR LAMINECTOMY      NASAL SEPTUM SURGERY      OTHER SURGICAL HISTORY  2015    Hemilaminectomy    SHOULDER SURGERY      UPPER GASTROINTESTINAL ENDOSCOPY      VASECTOMY         Patient  has no history on file for sexual activity.    Family History   Problem Relation Name Age of Onset    Coronary artery disease Mother      Coronary artery disease Father         Allergies   Allergen Reactions    Nitroglycerin Other     Arrhythmia / Heart Block       Prior to Admission medications    Medication Sig Start Date End Date Taking? Authorizing Provider   acetaminophen (Tylenol) 325 mg tablet Take 3 tablets (975 mg) by mouth every 8 hours if needed for mild pain (1 - 3) (post op pain). 24   Alysia Fuentes APRN-CNP   aspirin 81 mg EC tablet Take 1 tablet (81 mg) by mouth once daily.    Historical Provider, MD   atorvastatin (Lipitor) 20 mg tablet Take 1 tablet (20 mg) by mouth once daily. 24   Davonte Valdez MD   bromocriptine (Parlodel) 5 mg capsule Take 1 capsule (5 mg) by mouth once daily. 09   Historical Provider, MD    calcium acetate (Phoslo) 667 mg tablet Take 1 tablet (667 mg) by mouth once daily.    Historical Provider, MD   calcium polycarbophiL (FiberCon) 625 mg tablet Take 1 tablet (625 mg) by mouth once daily.    Historical Provider, MD   cyanocobalamin (Vitamin B-12) 100 mcg tablet Take 1 tablet (100 mcg) by mouth once daily.    Historical Provider, MD   cyclobenzaprine (Flexeril) 10 mg tablet Take 1 tablet (10 mg) by mouth 3 times a day as needed for muscle spasms (post op pain). 9/6/24   Radha Ching MD   gabapentin (Neurontin) 100 mg capsule Take 1 capsule (100 mg) by mouth 3 times a day. 9/6/24 10/6/24  Radha Ching MD   mirtazapine (Remeron) 15 mg tablet Take 1 tablet (15 mg) by mouth once daily at bedtime. 3/19/18   Historical Provider, MD   multivitamin with minerals (multivit-min-iron fum-folic ac) tablet Take 1 tablet by mouth once daily. 9/16/15   Historical Provider, MD   pantoprazole (Protonix) 40 mg packet Take 1 packet (40 mg) by mouth once daily in the morning. Take before meals.    Historical Provider, MD   PROzac 20 mg capsule Take 1 capsule (20 mg) by mouth once daily.    Historical Provider, MD   ranolazine (Ranexa) 500 mg 12 hr tablet Take 1 tablet (500 mg) by mouth every 12 hours. 5/13/24 5/13/25  Davonte Valdez MD   tamsulosin (Flomax) 0.4 mg 24 hr capsule Take 1 capsule (0.4 mg) by mouth once daily. 5/20/14   Historical Provider, MD   cyclobenzaprine (Flexeril) 10 mg tablet Take 1 tablet (10 mg) by mouth 3 times a day as needed for muscle spasms (post op pain). 4/16/24 9/6/24  BILL Pollock-CNP   gabapentin (Neurontin) 100 mg capsule Take 1 capsule (100 mg) by mouth 3 times a day. 8/9/24 9/6/24  Radha Ching MD        PAT ROS     DASI Risk Score    No data to display       Caprini DVT Assessment    No data to display       Modified Frailty Index    No data to display       CHADS2 Stroke Risk  Current as of 7 hours ago        N/A 3 to 100%: High Risk   2 to < 3%: Medium Risk   0 to < 2%:  Low Risk     Last Change: N/A          This score determines the patient's risk of having a stroke if the patient has atrial fibrillation.        This score is not applicable to this patient. Components are not calculated.          Revised Cardiac Risk Index    No data to display       Apfel Simplified Score    No data to display       Risk Analysis Index Results This Encounter    No data found in the last 1 encounters.       Scheduled for T9 transpedicular decompression, T7-11 Fusion L 1-2, L3-5 Laminectomy with Dr. Ching on 10/9/24.    Neurosurgery: Radha Ching MD LOV 8/9/24 seen for back pain that is radiating to his right lower extremity.  Neurosurgery: Davonte Mauricio MD LOV 7/9/24 post op visit, s/p nterior cervical disc excision with anterior interbody fusion and plate fixation at C3-4, C4-5, C5-6, and C6-7.   - CT LUMBAR SPINE WO IV CONTRAST 9/5/2024   IMPRESSION:  1. Asymmetric increased sclerosis within the right L3 lamina,  corresponding to sclerosis on the prior MRI dated 02/19/2024 and  likely represents a sequela of a healed fracture.  2. Multilevel degenerative changes of the lumbar spine most  pronounced for severe L1-L2, L3-L4 and L4-L5 spinal canal stenosis  and moderate right L3-L4 and bilateral L4-L5 and L5-S1 neural  foraminal stenosis, similar to the prior MRI.    - CT THORACIC SPINE WO IV CONTRAST; 7/1/2024   IMPRESSION:  Diffuse degenerative changes are present and these combine for severe  canal stenosis at T8-9. Compromise of foramen is multifocal appear  severe to the right at T3-4, right at T4-5, to the left at T8-9, and  to the right at T9-10.    -MR LUMBAR SPINE WO IV CONTRAST; 2/19/2024   IMPRESSION:  * Lumbar spondylosis with canal and foraminal narrowing as describ    - MR THORACIC SPINE WO IV CONTRAST; 2/19/2024   IMPRESSION:  Thoracic spondylosis with canal stenosis and cord compression at  T8/T9. No change in cord signal to suggest myelomalacia    - MR CERVICAL SPINE WO IV  CONTRAST; 2/6/2024   IMPRESSION:  Severe spinal canal stenosis at C4-C5 and C5-C6. At C5-C6 there is  focal cord signal abnormality suggestive of myelomalacia although a  component of cord edema is not completely excluded. There is also  advanced neural foraminal narrowing at these levels. Additional  multilevel degenerative change as above.    Cardiology: Davonte Valdez MD LOV 8/14/24 follow- up visit for arteriosclerotic heart disease.  P: [phone], F: [phone]  -ECG; 8/14/24  NSR at 62/min., WNL     -Nuclear Stress Test; 3/1/22  IMPRESSION:  1. Normal stress myocardial perfusion imaging in response to  pharmacologic stress.  2. Well-maintained left ventricular function.  50%    -Transthoracic Echo; 2/12/22  CONCLUSIONS:   1. The left ventricular systolic function is normal with a 55-60% estimated ejection fraction.   2. RVSP within normal limits.    -Cardiac Cath; 11/2/2020  CONCLUSIONS:   1. 70% stenosis of proximal second diagonal.   2. Mild LAD, circumflex and RCA disease with patent RCA stents.   3. Minimally elevated left heart filling pressures.     Nurse Plan of Action:   Risk Stratification sent to cardiology office on 9/12/24.   ONLY    Elle Marx RN  Pre-Admission Testing

## 2024-09-25 ENCOUNTER — TELEPHONE (OUTPATIENT)
Dept: NEUROLOGY | Facility: CLINIC | Age: 74
End: 2024-09-25
Payer: MEDICARE

## 2024-09-25 ENCOUNTER — TREATMENT (OUTPATIENT)
Dept: PHYSICAL THERAPY | Facility: CLINIC | Age: 74
End: 2024-09-25
Payer: MEDICARE

## 2024-09-25 DIAGNOSIS — M48.062 LUMBAR STENOSIS WITH NEUROGENIC CLAUDICATION: ICD-10-CM

## 2024-09-25 PROCEDURE — 97113 AQUATIC THERAPY/EXERCISES: CPT | Mod: GP,CQ

## 2024-09-25 NOTE — PROGRESS NOTES
PHYSICAL THERAPY   TREATMENT NOTE    Patient Name:  Misael Lopez   Patient MRN: [id number]  Date: 9/25/2024    Time Calculation  Start Time: 0150  Stop Time: 0230  Time Calculation (min): 40 min    Insurance:  Payor: [employer] / Plan: [employer] [place] [employer] / Product Type: *No Product type* /    No auth needed    Visit number: 3      General   Referring Provider: Radha Ching MD   Surgery: C3-4, C4-5, C5-6, C6-7 Anterior Disc Excisions/ Allograft Fusion & Plate Fixation C3-7 With Flat Plate Cervical X-ray, 4/15/2024.  Days since surgery: 147     Therapy diagnoses:   Problem List Items Addressed This Visit             ICD-10-CM    Lumbar stenosis with neurogenic claudication M48.062       Assessment:  Pt requires vc to reduce rom of hip flexion and abduction to reduce risk of aggravating sx's. Pt displays good tolerance of dumbbell and deep water exercises w/ noodle. Back and LE pain decreased following deep water exercises.     Plan: Continue with progression of aquatic / core strengthening activities without difficulty.       Subjective  Pt reports pain in R side of low back R thigh and R calf. Pt states that he is used to exercises in the pool. Pt states that his back surgery is scheduled for October 9th.   - Pain (0-10): 6.5/10     Precautions  Fall Risk: High   Denies: Cancer, Pacemaker, Diabetes, Hypertension, and other known pulmonary problems  Past Surgical history: L knee and shoulder arthroscopic procedures, cervical anterior disc excisions and plates in April 2024, lumbar disc laminectomy in 2009.   Past Medical history: other known cardiac problems- previous MI, has stents     Objective    Treatment Performed:   Aquatic Therapy:  Chest height water:  walking forward / backwards x 4 laps  Side stepping x 3 laps  Marching forward / backwards   Standing hip flexion, abduction, extension 20x ea  Squats 2 x 10    Rows purple dumbells 20x  Shoulder abd / add 20x  Shoulder horizitonal abd / add  20x  Shoulder flex/ext 20x     Deep water  Noodle under arms  Bicycle 2 minutes  Hip abd / add 20x  Alt ham curls 20x  Skiier        PT Therapeutic Procedures Time Entry  Aquatic Therapy Time Entry: 40

## 2024-09-26 ENCOUNTER — HOSPITAL ENCOUNTER (OUTPATIENT)
Dept: RADIOLOGY | Facility: HOSPITAL | Age: 74
Discharge: HOME | End: 2024-09-26
Payer: MEDICARE

## 2024-09-26 ENCOUNTER — PRE-ADMISSION TESTING (OUTPATIENT)
Dept: PREADMISSION TESTING | Facility: HOSPITAL | Age: 74
End: 2024-09-26
Payer: MEDICARE

## 2024-09-26 VITALS
BODY MASS INDEX: 33.59 KG/M2 | TEMPERATURE: 98.1 F | SYSTOLIC BLOOD PRESSURE: 115 MMHG | HEART RATE: 64 BPM | HEIGHT: 67 IN | DIASTOLIC BLOOD PRESSURE: 73 MMHG | WEIGHT: 214 LBS | OXYGEN SATURATION: 95 %

## 2024-09-26 DIAGNOSIS — Z01.818 PREOPERATIVE EXAMINATION: Primary | ICD-10-CM

## 2024-09-26 DIAGNOSIS — R79.1 ABNORMAL COAGULATION PROFILE: ICD-10-CM

## 2024-09-26 DIAGNOSIS — R79.89 OTHER SPECIFIED ABNORMAL FINDINGS OF BLOOD CHEMISTRY: ICD-10-CM

## 2024-09-26 DIAGNOSIS — M51.04 INTERVERTEBRAL THORACIC DISC DISORDER WITH MYELOPATHY, THORACIC REGION: ICD-10-CM

## 2024-09-26 DIAGNOSIS — M48.062 LUMBAR STENOSIS WITH NEUROGENIC CLAUDICATION: ICD-10-CM

## 2024-09-26 DIAGNOSIS — N39.0 URINARY TRACT INFECTION WITHOUT HEMATURIA, SITE UNSPECIFIED: ICD-10-CM

## 2024-09-26 LAB
ABO GROUP (TYPE) IN BLOOD: NORMAL
ANION GAP SERPL CALC-SCNC: 12 MMOL/L (ref 10–20)
ANTIBODY SCREEN: NORMAL
APPEARANCE UR: CLEAR
APTT PPP: 35 SECONDS (ref 27–38)
BASOPHILS # BLD AUTO: 0.06 X10*3/UL (ref 0–0.1)
BASOPHILS NFR BLD AUTO: 1 %
BILIRUB UR STRIP.AUTO-MCNC: NEGATIVE MG/DL
BUN SERPL-MCNC: 20 MG/DL (ref 6–23)
CALCIUM SERPL-MCNC: 9.2 MG/DL (ref 8.6–10.6)
CHLORIDE SERPL-SCNC: 105 MMOL/L (ref 98–107)
CO2 SERPL-SCNC: 26 MMOL/L (ref 21–32)
COLOR UR: YELLOW
CREAT SERPL-MCNC: 0.95 MG/DL (ref 0.5–1.3)
EGFRCR SERPLBLD CKD-EPI 2021: 85 ML/MIN/1.73M*2
EOSINOPHIL # BLD AUTO: 0.24 X10*3/UL (ref 0–0.4)
EOSINOPHIL NFR BLD AUTO: 3.9 %
ERYTHROCYTE [DISTWIDTH] IN BLOOD BY AUTOMATED COUNT: 12.9 % (ref 11.5–14.5)
EST. AVERAGE GLUCOSE BLD GHB EST-MCNC: 114 MG/DL
GLUCOSE SERPL-MCNC: 76 MG/DL (ref 74–99)
GLUCOSE UR STRIP.AUTO-MCNC: NORMAL MG/DL
HBA1C MFR BLD: 5.6 %
HCT VFR BLD AUTO: 39.4 % (ref 41–52)
HGB BLD-MCNC: 13.1 G/DL (ref 13.5–17.5)
IMM GRANULOCYTES # BLD AUTO: 0.01 X10*3/UL (ref 0–0.5)
IMM GRANULOCYTES NFR BLD AUTO: 0.2 % (ref 0–0.9)
INR PPP: 1.1 (ref 0.9–1.1)
KETONES UR STRIP.AUTO-MCNC: NEGATIVE MG/DL
LEUKOCYTE ESTERASE UR QL STRIP.AUTO: NEGATIVE
LYMPHOCYTES # BLD AUTO: 2.09 X10*3/UL (ref 0.8–3)
LYMPHOCYTES NFR BLD AUTO: 33.9 %
MCH RBC QN AUTO: 32.7 PG (ref 26–34)
MCHC RBC AUTO-ENTMCNC: 33.2 G/DL (ref 32–36)
MCV RBC AUTO: 98 FL (ref 80–100)
MONOCYTES # BLD AUTO: 0.53 X10*3/UL (ref 0.05–0.8)
MONOCYTES NFR BLD AUTO: 8.6 %
NEUTROPHILS # BLD AUTO: 3.24 X10*3/UL (ref 1.6–5.5)
NEUTROPHILS NFR BLD AUTO: 52.4 %
NITRITE UR QL STRIP.AUTO: NEGATIVE
NRBC BLD-RTO: 0 /100 WBCS (ref 0–0)
PH UR STRIP.AUTO: 5.5 [PH]
PLATELET # BLD AUTO: 192 X10*3/UL (ref 150–450)
POTASSIUM SERPL-SCNC: 3.9 MMOL/L (ref 3.5–5.3)
PROT UR STRIP.AUTO-MCNC: NEGATIVE MG/DL
PROTHROMBIN TIME: 12.5 SECONDS (ref 9.8–12.8)
RBC # BLD AUTO: 4.01 X10*6/UL (ref 4.5–5.9)
RBC # UR STRIP.AUTO: NEGATIVE /UL
RH FACTOR (ANTIGEN D): NORMAL
SODIUM SERPL-SCNC: 139 MMOL/L (ref 136–145)
SP GR UR STRIP.AUTO: 1.02
UROBILINOGEN UR STRIP.AUTO-MCNC: NORMAL MG/DL
WBC # BLD AUTO: 6.2 X10*3/UL (ref 4.4–11.3)

## 2024-09-26 PROCEDURE — 71046 X-RAY EXAM CHEST 2 VIEWS: CPT

## 2024-09-26 PROCEDURE — 87081 CULTURE SCREEN ONLY: CPT

## 2024-09-26 PROCEDURE — 83036 HEMOGLOBIN GLYCOSYLATED A1C: CPT

## 2024-09-26 PROCEDURE — 99204 OFFICE O/P NEW MOD 45 MIN: CPT | Performed by: NURSE PRACTITIONER

## 2024-09-26 PROCEDURE — 85610 PROTHROMBIN TIME: CPT

## 2024-09-26 PROCEDURE — 85025 COMPLETE CBC W/AUTO DIFF WBC: CPT

## 2024-09-26 PROCEDURE — 72082 X-RAY EXAM ENTIRE SPI 2/3 VW: CPT

## 2024-09-26 PROCEDURE — 86901 BLOOD TYPING SEROLOGIC RH(D): CPT

## 2024-09-26 PROCEDURE — 80048 BASIC METABOLIC PNL TOTAL CA: CPT

## 2024-09-26 PROCEDURE — 36415 COLL VENOUS BLD VENIPUNCTURE: CPT

## 2024-09-26 PROCEDURE — 81003 URINALYSIS AUTO W/O SCOPE: CPT

## 2024-09-26 RX ORDER — CHLORHEXIDINE GLUCONATE ORAL RINSE 1.2 MG/ML
SOLUTION DENTAL
Qty: 473 ML | Refills: 0 | Status: SHIPPED | OUTPATIENT
Start: 2024-09-26

## 2024-09-26 RX ORDER — CHLORHEXIDINE GLUCONATE 40 MG/ML
SOLUTION TOPICAL 2 TIMES DAILY
Qty: 473 ML | Refills: 0 | Status: SHIPPED | OUTPATIENT
Start: 2024-09-26 | End: 2024-10-01

## 2024-09-26 ASSESSMENT — DUKE ACTIVITY SCORE INDEX (DASI)
CAN YOU WALK INDOORS, SUCH AS AROUND YOUR HOUSE: YES
CAN YOU DO YARD WORK LIKE RAKING LEAVES, WEEDING OR PUSHING A MOWER: YES
CAN YOU DO LIGHT WORK AROUND THE HOUSE LIKE DUSTING OR WASHING DISHES: YES
CAN YOU TAKE CARE OF YOURSELF (EAT, DRESS, BATHE, OR USE TOILET): YES
CAN YOU PARTICIPATE IN STRENOUS SPORTS LIKE SWIMMING, SINGLES TENNIS, FOOTBALL, BASKETBALL, OR SKIING: NO
DASI METS SCORE: 8.2
CAN YOU DO MODERATE WORK AROUND THE HOUSE LIKE VACUUMING, SWEEPING FLOORS OR CARRYING GROCERIES: YES
CAN YOU DO HEAVY WORK AROUND THE HOUSE LIKE SCRUBBING FLOORS OR LIFTING AND MOVING HEAVY FURNITURE: YES
CAN YOU RUN A SHORT DISTANCE: YES
TOTAL_SCORE: 44.7
CAN YOU CLIMB A FLIGHT OF STAIRS OR WALK UP A HILL: YES
CAN YOU PARTICIPATE IN MODERATE RECREATIONAL ACTIVITIES LIKE GOLF, BOWLING, DANCING, DOUBLES TENNIS OR THROWING A BASEBALL OR FOOTBALL: NO
CAN YOU HAVE SEXUAL RELATIONS: YES
CAN YOU WALK A BLOCK OR TWO ON LEVEL GROUND: YES

## 2024-09-26 ASSESSMENT — LIFESTYLE VARIABLES: SMOKING_STATUS: NONSMOKER

## 2024-09-26 ASSESSMENT — ENCOUNTER SYMPTOMS
GASTROINTESTINAL NEGATIVE: 1
CONSTITUTIONAL NEGATIVE: 1
CARDIOVASCULAR NEGATIVE: 1
EYES NEGATIVE: 1
NECK NEGATIVE: 1
MUSCULOSKELETAL NEGATIVE: 1
ENDOCRINE NEGATIVE: 1
RESPIRATORY NEGATIVE: 1

## 2024-09-26 NOTE — PREPROCEDURE INSTRUCTIONS
Thank you for visiting [facility] ([facility]) today for your pre-procedure evaluation, you were seen by     Samantha Meeson, MSN, NP-C  Adult-Gerontology Nurse Practitioner II  Department of Anesthesiology and Perioperative Medicine  Main phone [phone]  Direct phone [phone]  Fax [phone]    This summary includes instructions and information to aid you during your perioperative period.  Please read carefully. If you have any questions about your visit today, please call the number listed above.  If you become ill or have any changes to your health before your surgery, please contact your primary care provider and alert your surgeon.    Preparing for your Surgery       Exercises  Preoperative Deep Breathing Exercises  Why it is important to do deep breathing exercises before my surgery?  Deep breathing exercises strengthen your breathing muscles.  This helps you to recover after your surgery and decreases the chance of breathing complications.  How are the deep breathing exercises done?  Sit straight with your back supported.  Breathe in deeply and slowly through your nose. Your lower rib cage should expand and your abdomen may move forward.  Hold that breath for 3 to 5 seconds.  Breathe out through pursed lips, slowly and completely.  Rest and repeat 10 times every hour while awake.  Rest longer if you become dizzy or lightheaded.       Incentive Spirometer   You were provided with an incentive spirometer in [facility], please follow the below instructions.   You were not provided an incentive spirometer in [facility], please disregard the incentive spirometer instructions  What is an incentive spirometer?  An incentive spirometer is a device used before and after surgery to “exercise” your lungs.  It helps you to take deeper breaths to expand your lungs.  Below is an example of a basic incentive spirometer.  The device you receive may differ slightly but they all function the  same.    Why do I need to use an incentive spirometer?  Using your incentive spirometer prepares your lungs for surgery and helps prevent lung problems after surgery.  How do I use my incentive spirometer?  When you're using your incentive spirometer, make sure to breathe through your mouth. If you breathe through your nose, the incentive spirometer won't work properly. You can hold your nose if you have trouble.  If you feel dizzy at any time, stop and rest. Try again at a later time.  Follow the steps below:  Set up your incentive spirometer, expand the flexible tubing and connect to the outlet.  Sit upright in a chair or bed. Hold the incentive spirometer at eye level.   Put the mouthpiece in your mouth and close your lips tightly around it. Slowly breathe out (exhale) completely.  Breathe in (inhale) slowly through your mouth as deeply as you can. As you take a breath, you will see the piston rise inside the large column. While the piston rises, the indicator should move upwards. It should stay in between the 2 arrows (see Figure).  Try to get the piston as high as you can, while keeping the indicator between the arrows.   If the indicator doesn't stay between the arrows, you're breathing either too fast or too slow.  When you get it as high as you can, hold your breath for 10 seconds, or as long as possible. While you're holding your breath, the piston will slowly fall to the base of the spirometer.  Once the piston reaches the bottom of the spirometer, breathe out slowly through your mouth. Rest for a few seconds.  Repeat 10 times. Try to get the piston to the same level with each breath.  Repeat every hour while awake  You can carefully clean the outside of the mouthpiece with an alcohol wipe or soap and water.      Preoperative Brain Exercises    What are brain exercises?  A brain exercise is any activity that engages your thinking (cognitive) skills.    What types of activities are considered brain  exercises?  Jigsaw puzzles, crossword puzzles, word jumble, memory games, word search, and many more.  Many can be found free online or on your phone via a mobile honey.    Why should I do brain exercises before my surgery?  More recent research has shown brain exercise before surgery can lower the risk of postoperative delirium (confusion) which can be especially important for older adults.  Patients who did brain exercises for 5 to 10 hours the days before surgery, cut their risk of postoperative delirium in half up to 1 week after surgery.    Sit-to-Stand Exercise    What is the sit-to-stand exercise?  The sit-to-stand exercise strengthens the muscles of your lower body and muscles in the center of your body (core muscles for stability) helping to maintain and improve your strength and mobility.  How do I do the sit-to-stand exercise?  The goal is to do this exercise without using your arms or hands.  If this is too difficult, use your arms and hands or a chair with armrests to help slowly push yourself to the standing position and lower yourself back to the sitting position. As the movement becomes easier use your arms and hands less.    Steps to the sit-to-stand exercise  Sit up tall in a sturdy chair, knees bent, feet flat on the floor shoulder-width apart.  Shift your hips/pelvis forward in the chair to correctly position yourself for the next movement.  Lean forward at your hips.  Stand up straight putting equal weight on both feet.  Check to be sure you are properly aligned with the chair, in a slow controlled movement sit back down.  Repeat this exercise 10-15 times.  If needed you can do it fewer times until your strength improves.  Rest for 1 minute.  Do another 10-15 sit-to-stand exercises.  Try to do this in the morning and evening.        Instructions    Preoperative Fasting Guidelines    Why must I stop eating and drinking near surgery time?  With sedation, food or liquid in your stomach can enter your  lungs causing serious complications  Food can increase nausea and vomiting  When do I need to stop eating and drinking before my surgery?      Do not eat any food after midnight the night before your surgery/procedure. You may have up to 13.5 ounces of clear liquid until TWO hours before your instructed arrival time to the hospital.  This includes water, black tea/coffee, (no milk or cream) apple juice, and electrolyte drinks (Gatorade). You may chew gum until TWO hours before your surgery/procedure            Simple things you can do to help prevent blood clots     Blood clots are blockages that can form in the body's veins. When a blood clot forms in your deep veins, it may be called a deep vein thrombosis, or DVT for short. Blood clots can happen in any part of the body where blood flows, but they are most common in the arms and legs. If a piece of a blood clot breaks free and travels to the lungs, it is called a pulmonary embolus (PE). A PE can be a very serious problem.         Being in the hospital or having surgery can raise your chances of getting a blood clot because you may not be well enough to move around as much as you normally do.         Ways you can help prevent blood clots in the hospital       Wearing SCDs  SCDs stands for Sequential Compression Devices.   SCDs are special sleeves that wrap around your legs. They attach to a pump that fills them with air to gently squeeze your legs every few minutes.  This helps return the blood in your legs to your heart.   SCDs should only be taken off when walking or bathing. SCDs may not be comfortable, but they can help save your life.              Pump SCD leg sleeves  Wearing compression stockings - if your doctor orders them. These special snug-fitting stockings gently squeeze your legs to help blood flow.       Walking. Walking helps move the blood in your legs.   If your doctor says it is ok, try walking the halls at least   5 times a day. Ask us to help  you get up, so you don't fall.      Taking any blood-thinning medicines your doctor orders.              Ways you can help prevent blood clots at home         Wearing compression stockings - if your doctor orders them.   Walking - to help move the blood in your legs.    Taking any blood-thinning medicines your doctor orders.      Signs of a blood clot or PE    Tell your doctor or nurse right away if you have any of the problems listed below.         If you are at home, seek medical care right away. Call 911 for chest pain or problems breathing.            Signs of a blood clot (DVT) - such as pain, swelling, redness, or warmth in your arm or legs.  Signs of a pulmonary embolism (PE) - such as chest pain or feeling short of breath      Tobacco and Alcohol;  Do not drink alcohol or smoke within 24 hours of surgery.  It is best to quit smoking for as long as possible before any surgery or procedure.        The Week before Surgery        Seven days before Surgery  Check your [facility] medication instructions  Do the exercises provided to you by [facility]   Arrange for a responsible, adult licensed  to take you home after surgery and stay with you for 24 hours.  You will not be permitted to drive yourself home if you have received any anesthetic/sedation  Six days before surgery  Check your [facility] medication instructions  Do the exercises provided to you by [facility]   Start using Chlorhexidene (CHG) body wash if prescribed  Five days before surgery  Check your [facility] medication instructions  Do the exercises provided to you by [facility]   Continue to use CHG body wash if prescribed  Three days before surgery  Check your [facility] medication instructions  Do the exercises provided to you by [facility]   Continue to use CHG body wash if prescribed  Two days before surgery  Check your [facility] medication instructions  Do the exercises provided to you by [facility]   Continue to use CHG body wash if prescribed    The Day before Surgery       Check your [facility] medication and  all other [facility] instructions including when to stop eating and drinking  You will be called with your arrival time for surgery in the late afternoon.  If you do not receive a call please reach out to your surgeon's office.  Do not smoke or drink 24 hours before surgery  Prepare items to bring with you to the hospital  Shower with your chlorhexidine wash if prescribed  Brush your teeth and use your chlorhexidine dental rinse if prescribed    The Day of Surgery       Check your [facility] medication instructions  Ensure you follow the instructions for when to stop eating and drinking  Shower, if prescribed use CHG.  Do not apply any lotions, creams, moisturizers, perfume or deodorant  Brush your teeth and use your CHG dental rinse if prescribed  Wear loose comfortable clothing  Avoid make-up  Remove  jewelry and piercings, consider professional piercing removal with a plastic spacer if needed  Bring photo ID and Insurance card  Bring an accurate medication list that includes medication dose, frequency and allergies  Bring a copy of your advanced directives (will, health care power of )  Bring any devices and controllers as well as medical devices you have been provided with for surgery (CPAP, slings, braces, etc.)  Dentures, eyeglasses, and contacts will be removed before surgery, please bring cases for contacts or glasses

## 2024-09-26 NOTE — H&P (VIEW-ONLY)
CPM/PAT Evaluation       Name: Misael Lopez (Misael Lopez)  /Age: 1950/74 y.o.     Visit Type:   In-Person       Chief Complaint: Lumbar spinal stenosis with neurogenic claudication scheduled for surgery    HPI: Patient is a 74-year-old male scheduled for T9 transpedicular decompression, T7-11 fusion, L1-2, L3-5 laminectomy on 2024 for treatment of lumbar spinal stenosis with neurogenic claudication and intervertebral thoracic disc disorder with myelopathy.  The patient is referred by Dr. Radah Ching for preoperative evaluation of anxiety, depression, osteoarthritis, lumbar spinal stenosis with neurogenic claudication, intervertebral thoracic disc disorder with myelopathy, CAD status post MI with PCI and stent placement, GERD, hypertension, hyperlipidemia, nephrolithiasis, obesity status post gastric bypass, sleep apnea compliant with CPAP.    Past Medical History:   Diagnosis Date    Anxiety     Arthritis     ASHD (arteriosclerotic heart disease)     Coronary artery disease     Depression     GERD (gastroesophageal reflux disease)     HL (hearing loss)     Hyperlipidemia     Hypertension     Lumbar radiculopathy     Myocardial infarction (Multi)     Nephrolithiasis     NSVT (nonsustained ventricular tachycardia) (Multi)     Obesity     S/P gastric bypass    Sleep apnea     Spinal stenosis     Thoracic myelopathy        Past Surgical History:   Procedure Laterality Date    BREAST LUMPECTOMY      BUNIONECTOMY      CARDIAC CATHETERIZATION  2020    CARPAL TUNNEL RELEASE      CERVICAL SPINE SURGERY  04/15/2024    ACDFP    COLONOSCOPY      GASTRIC BYPASS      KNEE ARTHROSCOPY W/ DEBRIDEMENT      LITHOTRIPSY      Renal Lithotripsy    LUMBAR LAMINECTOMY      NASAL SEPTUM SURGERY      OTHER SURGICAL HISTORY  2015    Hemilaminectomy    SHOULDER SURGERY      UPPER GASTROINTESTINAL ENDOSCOPY      VASECTOMY         Patient  has no history on file for sexual activity.    Family History   Problem  Relation Name Age of Onset    Coronary artery disease Mother      Coronary artery disease Father         Allergies   Allergen Reactions    Nitroglycerin Other     Arrhythmia / Heart Block       Prior to Admission medications    Medication Sig Start Date End Date Taking? Authorizing Provider   aspirin 81 mg EC tablet Take 1 tablet (81 mg) by mouth once daily.   Yes Historical Provider, MD   atorvastatin (Lipitor) 20 mg tablet Take 1 tablet (20 mg) by mouth once daily. 5/13/24  Yes Davonte Valdez MD   bromocriptine (Parlodel) 5 mg capsule Take 1 capsule (5 mg) by mouth once daily. 5/28/09  Yes Historical Provider, MD   calcium acetate (Phoslo) 667 mg tablet Take 1 tablet (667 mg) by mouth once daily.   Yes Historical Provider, MD   calcium polycarbophiL (FiberCon) 625 mg tablet Take 1 tablet (625 mg) by mouth once daily.   Yes Historical Provider, MD   cyanocobalamin (Vitamin B-12) 100 mcg tablet Take 1 tablet (100 mcg) by mouth once daily.   Yes Historical Provider, MD   cyclobenzaprine (Flexeril) 10 mg tablet Take 1 tablet (10 mg) by mouth 3 times a day as needed for muscle spasms (post op pain). 9/6/24  Yes Radha Ching MD   gabapentin (Neurontin) 100 mg capsule Take 1 capsule (100 mg) by mouth 3 times a day. 9/6/24 10/6/24 Yes Radha Ching MD   mirtazapine (Remeron) 15 mg tablet Take 1 tablet (15 mg) by mouth once daily at bedtime. 3/19/18  Yes Historical Provider, MD   multivitamin with minerals (multivit-min-iron fum-folic ac) tablet Take 1 tablet by mouth once daily. 9/16/15  Yes Historical Provider, MD   pantoprazole (Protonix) 40 mg packet Take 1 packet (40 mg) by mouth once daily in the morning. Take before meals.   Yes Historical Provider, MD   PROzac 20 mg capsule Take 1 capsule (20 mg) by mouth once daily.   Yes Historical Provider, MD   ranolazine (Ranexa) 500 mg 12 hr tablet Take 1 tablet (500 mg) by mouth every 12 hours. 5/13/24 5/13/25 Yes Davonte Valdez MD   tamsulosin (Flomax) 0.4 mg 24 hr  capsule Take 1 capsule (0.4 mg) by mouth once daily. 5/20/14  Yes Historical Provider, MD   acetaminophen (Tylenol) 325 mg tablet Take 3 tablets (975 mg) by mouth every 8 hours if needed for mild pain (1 - 3) (post op pain). 4/16/24   JEIMY Pollock   chlorhexidine (Hibiclens) 4 % external liquid Apply topically 2 times a day for 5 days. 9/26/24 10/1/24  Samantha A Meeson, APRN-CNP   chlorhexidine (Peridex) 0.12 % solution Swish and spit 15 mL night before surgery and morning of surgery 9/26/24   Samantha A Meeson, APRN-CNP        PAT ROS:   Constitutional:   neg    Neuro/Psych:    Right lower extremity numbness and tingling  Eyes:   neg     use of corrective lenses  Ears:   neg     hearing aides  Nose:   neg    Mouth:   neg    Throat:   neg    Neck:   neg    Cardio:   neg    Respiratory:   neg    Endocrine:   neg    GI:   neg    :   neg    Musculoskeletal:   neg    Hematologic:   neg    Skin:  neg        Physical Exam  Vitals reviewed.   Constitutional:       Appearance: Normal appearance.      Comments: Beard and mustache   HENT:      Head: Normocephalic.      Nose: Nose normal.      Mouth/Throat:      Mouth: Mucous membranes are moist.   Eyes:      Conjunctiva/sclera: Conjunctivae normal.   Neck:      Vascular: No carotid bruit.   Cardiovascular:      Rate and Rhythm: Normal rate and regular rhythm.      Pulses: Normal pulses.      Heart sounds: Normal heart sounds.   Pulmonary:      Effort: Pulmonary effort is normal.      Breath sounds: Normal breath sounds.   Abdominal:      Palpations: Abdomen is soft.      Tenderness: There is no abdominal tenderness.   Musculoskeletal:         General: Normal range of motion.      Cervical back: Normal range of motion.      Right lower leg: No edema.      Left lower leg: No edema.   Lymphadenopathy:      Cervical: No cervical adenopathy.   Skin:     General: Skin is warm and dry.      Capillary Refill: Capillary refill takes less than 2 seconds.   Neurological:       General: No focal deficit present.      Mental Status: He is alert and oriented to person, place, and time.   Psychiatric:         Mood and Affect: Mood normal.         Behavior: Behavior normal. Behavior is cooperative.         Thought Content: Thought content normal.         Judgment: Judgment normal.          PAT AIRWAY:   Airway:     Mallampati::  III    Neck ROM::  Full      Visit Vitals  /73   Pulse 64   Temp 36.7 °C (98.1 °F)       DASI Risk Score      Flowsheet Row Pre-Admission Testing from 9/26/2024 in [facility] Pre-Admission Testing from 4/1/2024 in [facility]   Can you take care of yourself (eat, dress, bathe, or use toilet)?  2.75 filed at 09/26/2024 0925 2.75 filed at 04/01/2024 1347   Can you walk indoors, such as around your house? 1.75 filed at 09/26/2024 0925 1.75 filed at 04/01/2024 1347   Can you walk a block or two on level ground?  2.75 filed at 09/26/2024 0925 2.75 filed at 04/01/2024 1347   Can you climb a flight of stairs or walk up a hill? 5.5 filed at 09/26/2024 0925 5.5 filed at 04/01/2024 1347   Can you run a short distance? 8 filed at 09/26/2024 0925 8 filed at 04/01/2024 1347   Can you do light work around the house like dusting or washing dishes? 2.7 filed at 09/26/2024 0925 2.7 filed at 04/01/2024 1347   Can you do moderate work around the house like vacuuming, sweeping floors or carrying groceries? 3.5 filed at 09/26/2024 0925 3.5 filed at 04/01/2024 1347   Can you do heavy work around the house like scrubbing floors or lifting and moving heavy furniture?  8 filed at 09/26/2024 0925 0 filed at 04/01/2024 1347   Can you do yard work like raking leaves, weeding or pushing a mower? 4.5 filed at 09/26/2024 0925 4.5 filed at 04/01/2024 1347   Can you have sexual relations? 5.25 filed at 09/26/2024 0925 5.25 filed at 04/01/2024 1347   Can you participate in moderate recreational activities like golf, bowling, dancing, doubles tennis or throwing a  baseball or football? 0 filed at 09/26/2024 0925 0 filed at 04/01/2024 1347   Can you participate in strenous sports like swimming, singles tennis, football, basketball, or skiing? 0 filed at 09/26/2024 0925 0 filed at 04/01/2024 1347   DASI SCORE 44.7 filed at 09/26/2024 0925 36.7 filed at 04/01/2024 1347   METS Score (Will be calculated only when all the questions are answered) 8.2 filed at 09/26/2024 0925 7.3 filed at 04/01/2024 1347          Caprini DVT Assessment      Flowsheet Row Pre-Admission Testing from 9/26/2024 in [facility] Admission (Discharged) from 4/15/2024 in [facility] 2 with Davonte Mauricio MD   DVT Score 7 filed at 09/26/2024 0943 3 filed at 04/15/2024 1252   Surgical Factors Major surgery planned, including arthroscopic and laproscopic (1-2 hours) filed at 09/26/2024 0943 --   BMI 31-40 (Obesity) filed at 09/26/2024 0943 30 or less filed at 04/15/2024 1252   RETIRED: Age -- 60-75 years filed at 04/15/2024 1252          Modified Frailty Index      Flowsheet Row Pre-Admission Testing from 9/26/2024 in [facility]   Non-independent functional status (problems with dressing, bathing, personal grooming, or cooking) 0 filed at 09/26/2024 0944   History of diabetes mellitus  0 filed at 09/26/2024 0944   History of COPD 0 filed at 09/26/2024 0944   History of CHF No filed at 09/26/2024 0944   History of MI 0.0909 filed at 09/26/2024 0944   History of Percutaneous Coronary Intervention, Cardiac Surgery, or Angina 0.0909 filed at 09/26/2024 0944   Hypertension requiring the use of medication  0.0909 filed at 09/26/2024 0944   Peripheral vascular disease 0 filed at 09/26/2024 0944   Impaired sensorium (cognitive impairement or loss, clouding, or delirium) 0 filed at 09/26/2024 0944   TIA or CVA withouy residual deficit 0 filed at 09/26/2024 0944   Cerebrovascular accident with deficit 0 filed at 09/26/2024 0944   Modified Frailty Index Calculator .2727 filed  at 09/26/2024 0944          CHADS2 Stroke Risk  Current as of 8 minutes ago        N/A 3 to 100%: High Risk   2 to < 3%: Medium Risk   0 to < 2%: Low Risk     Last Change: N/A          This score determines the patient's risk of having a stroke if the patient has atrial fibrillation.        This score is not applicable to this patient. Components are not calculated.          Revised Cardiac Risk Index      Flowsheet Row Pre-Admission Testing from 9/26/2024 in [facility]   High-Risk Surgery (Intraperitoneal, Intrathoracic,Suprainguinal vascular) 0 filed at 09/26/2024 0944   History of ischemic heart disease (History of MI, History of positive exercuse test, Current chest paint considered due to myocardial ischemia, Use of nitrate therapy, ECG with pathological Q Waves) 1 filed at 09/26/2024 0944   History of congestive heart failure (pulmonary edemia, bilateral rales or S3 gallop, Paroxysmal nocturnal dyspnea, CXR showing pulmonary vascular redistribution) 0 filed at 09/26/2024 0944   History of cerebrovascular disease (Prior TIA or stroke) 0 filed at 09/26/2024 0944   Pre-operative insulin treatment 0 filed at 09/26/2024 0944   Pre-operative creatinine>2 mg/dl 0 filed at 09/26/2024 0944   Revised Cardiac Risk Calculator 1 filed at 09/26/2024 0944          Apfel Simplified Score      Flowsheet Row Pre-Admission Testing from 9/26/2024 in [facility]   Smoking status 1 filed at 09/26/2024 0944   History of motion sickness or PONV  0 filed at 09/26/2024 0944   Use of postoperative opioids 1 filed at 09/26/2024 0944   Gender - Female 0=No filed at 09/26/2024 0944   Apfel Simplified Score Calculator 2 filed at 09/26/2024 0944          Risk Analysis Index Results This Encounter    No data found in the last 10 encounters.       Stop Bang Score      Flowsheet Row Pre-Admission Testing from 9/26/2024 in [facility]   Do you snore loudly? 1 filed at 09/26/2024 0925   Do you  often feel tired or fatigued after your sleep? 1 filed at 09/26/2024 0925   Has anyone ever observed you stop breathing in your sleep? 1 filed at 09/26/2024 0925   Do you have or are you being treated for high blood pressure? 0 filed at 09/26/2024 0925   Recent BMI (Calculated) 33.5 filed at 09/26/2024 0925   Is BMI greater than 35 kg/m2? 0=No filed at 09/26/2024 0925   Age older than 50 years old? 1=Yes filed at 09/26/2024 0925   Is your neck circumference greater than 17 inches (Male) or 16 inches (Female)? 1 filed at 09/26/2024 0925   Gender - Male 1=Yes filed at 09/26/2024 0925   STOP-BANG Total Score 6 filed at 09/26/2024 0925               Assessment and Plan:   Neuro: Anxiety and depression managed on mirtazapine (continue) and Prozac (continue).    The patient is at an increased risk for post operative delirium secondary to age >/= 65, depression, and type and duration of surgery.  Preoperative brain exercise educational handout provided to patient.    The patient is at an increased risk for perioperative stroke secondary to cardiac disease, preoperative interrupton of antithrombotic, increased age, HTN, HLD, general anesthesia, and op time >2.5 hours.    HEENT/Airway:  No diagnosis or significant findings on chart review or clinical presentation and evaluation.     Cardiovascular:  CAD status post MI with PCI and stent placement managed on atorvastatin (continue), ranolazine (continue) and 81 mg daily aspirin (continue).    Hypertension not currently requiring medication.  Hyperlipidemia managed on atorvastatin (continue).     EKG 08/14/2024 normal sinus rhythm    Stress test 02/28/22  IMPRESSION:  1. Normal stress myocardial perfusion imaging in response to  pharmacologic stress.  2. Well-maintained left ventricular function.  50%    Echo 02/12/22  CONCLUSIONS:   1. The left ventricular systolic function is normal with a 55-60% estimated ejection fraction.   2. RVSP within normal limits.    Cardiac cath  2020  CONCLUSIONS:   1. 70% stenosis of proximal second diagonal.   2. Mild LAD, circumflex and RCA disease with patent RCA stents.   3. Minimally elevated left heart filling pressures.    Patient follows with cardiology, Dr. Davonte Valdez, last visit 2024.  Patient stable and to follow-up in 3 months. Risk stratification scanned into media tab 24 and patient is a moderately increased risk but is medically optimized for surgery okay to hold aspirin 7 days prior.    No additional preoperative testing is currently indicated.    METS are 8.2    RCRI  1 which is 6% 30 day risk of MACE (risk for cardiac death, nonfatal myocardial infarction, and nonfactal cardiac arrest    JOEY score which indicates a 0.1% risk of intraoperative or 30-day postoperative MACE      Pulmonary:   sleep apnea compliant with CPAP.  Preoperative deep breathing educational handout provided to patient.    ARISCAT:   26   points which is a intermediate (13.3%) risk of in-hospital post-op pulmonary complications     PRODIGY:  25  points which is a high risk of post op opioid induced respiratory depression episodes    STOP BAN  points which is a high risk for moderate to severe JOSE    Renal: Nephrolithiasis managed on tamsulosin (continue).    No diagnosis or significant findings on chart review or clinical presentation and evaluation, however, the patient is at increased risk of perioperative renal complications secondary to age>/= 56, male sex, and HTN. Preventative measures include preoperative BP control and hydration.    Endocrine:  No diagnosis or significant findings on chart review or clinical presentation and evaluation.    Hematologic:   No diagnosis or significant findings on chart review or clinical presentation and evaluation however see below risk screening tool.  Preoperative DVT educational handout provided to patient.    Caprini Score:  7  points which is a highest risk of perioperative  VTE    Gastrointestinal:  GERD managed on pantoprazole (continue). obesity status post gastric bypass managed on multiple oral supplementations (hold day of surgery)    EAT-10 score of   0   - self-perceived oropharyngeal dysphagia scale (0-40)     Apfel: 2 points 39% risk for post operative N/V    Infectious disease:  No diagnosis or significant findings on chart review or clinical presentation and evaluation.     Musculoskeletal:  osteoarthritis, lumbar spinal stenosis with neurogenic claudication and intervertebral thoracic disc disorder with myelopathy scheduled for surgery.  Patient managed on acetaminophen (continue), cyclobenzaprine (continue) and gabapentin (continue).      Labs ordered  Recent Results (from the past 168 hour(s))   CBC and Auto Differential    Collection Time: 09/26/24  9:53 AM   Result Value Ref Range    WBC 6.2 4.4 - 11.3 x10*3/uL    nRBC 0.0 0.0 - 0.0 /100 WBCs    RBC 4.01 (L) 4.50 - 5.90 x10*6/uL    Hemoglobin 13.1 (L) 13.5 - 17.5 g/dL    Hematocrit 39.4 (L) 41.0 - 52.0 %    MCV 98 80 - 100 fL    MCH 32.7 26.0 - 34.0 pg    MCHC 33.2 32.0 - 36.0 g/dL    RDW 12.9 11.5 - 14.5 %    Platelets 192 150 - 450 x10*3/uL    Neutrophils % 52.4 40.0 - 80.0 %    Immature Granulocytes %, Automated 0.2 0.0 - 0.9 %    Lymphocytes % 33.9 13.0 - 44.0 %    Monocytes % 8.6 2.0 - 10.0 %    Eosinophils % 3.9 0.0 - 6.0 %    Basophils % 1.0 0.0 - 2.0 %    Neutrophils Absolute 3.24 1.60 - 5.50 x10*3/uL    Immature Granulocytes Absolute, Automated 0.01 0.00 - 0.50 x10*3/uL    Lymphocytes Absolute 2.09 0.80 - 3.00 x10*3/uL    Monocytes Absolute 0.53 0.05 - 0.80 x10*3/uL    Eosinophils Absolute 0.24 0.00 - 0.40 x10*3/uL    Basophils Absolute 0.06 0.00 - 0.10 x10*3/uL   Coagulation Screen    Collection Time: 09/26/24  9:53 AM   Result Value Ref Range    Protime 12.5 9.8 - 12.8 seconds    INR 1.1 0.9 - 1.1    aPTT 35 27 - 38 seconds   Type And Screen    Collection Time: 09/26/24  9:53 AM   Result Value Ref Range     ABO TYPE O     Rh TYPE POS     ANTIBODY SCREEN NEG    Basic Metabolic Panel    Collection Time: 09/26/24  9:53 AM   Result Value Ref Range    Glucose 76 74 - 99 mg/dL    Sodium 139 136 - 145 mmol/L    Potassium 3.9 3.5 - 5.3 mmol/L    Chloride 105 98 - 107 mmol/L    Bicarbonate 26 21 - 32 mmol/L    Anion Gap 12 10 - 20 mmol/L    Urea Nitrogen 20 6 - 23 mg/dL    Creatinine 0.95 0.50 - 1.30 mg/dL    eGFR 85 >60 mL/min/1.73m*2    Calcium 9.2 8.6 - 10.6 mg/dL   Hemoglobin A1C    Collection Time: 09/26/24  9:53 AM   Result Value Ref Range    Hemoglobin A1C 5.6 See comment %    Estimated Average Glucose 114 Not Established mg/dL   Staphylococcus aureus/MRSA colonization, Culture    Collection Time: 09/26/24  9:53 AM    Specimen: Nares/Axilla/Groin; Swab   Result Value Ref Range    Staph/MRSA Screen Culture No Staphylococcus aureus isolated    Urinalysis with Reflex Culture and Microscopic    Collection Time: 09/26/24  9:53 AM   Result Value Ref Range    Color, Urine Yellow Light-Yellow, Yellow, Dark-Yellow    Appearance, Urine Clear Clear    Specific Gravity, Urine 1.020 1.005 - 1.035    pH, Urine 5.5 5.0, 5.5, 6.0, 6.5, 7.0, 7.5, 8.0    Protein, Urine NEGATIVE NEGATIVE, 10 (TRACE), 20 (TRACE) mg/dL    Glucose, Urine Normal Normal mg/dL    Blood, Urine NEGATIVE NEGATIVE    Ketones, Urine NEGATIVE NEGATIVE mg/dL    Bilirubin, Urine NEGATIVE NEGATIVE    Urobilinogen, Urine Normal Normal mg/dL    Nitrite, Urine NEGATIVE NEGATIVE    Leukocyte Esterase, Urine NEGATIVE NEGATIVE   Extra Urine Gray Tube    Collection Time: 09/26/24  9:53 AM   Result Value Ref Range    Extra Tube Hold for add-ons.        IMPRESSION:  Low lung volumes but no evidence of acute intrathoracic abnormality.

## 2024-09-26 NOTE — CPM/PAT H&P
CPM/PAT Evaluation       Name: Misael Lopez (Misael Lopez)  /Age: 1950/74 y.o.     Visit Type:   In-Person       Chief Complaint: Lumbar spinal stenosis with neurogenic claudication scheduled for surgery    HPI: Patient is a 74-year-old male scheduled for T9 transpedicular decompression, T7-11 fusion, L1-2, L3-5 laminectomy on 2024 for treatment of lumbar spinal stenosis with neurogenic claudication and intervertebral thoracic disc disorder with myelopathy.  The patient is referred by Dr. Radha Ching for preoperative evaluation of anxiety, depression, osteoarthritis, lumbar spinal stenosis with neurogenic claudication, intervertebral thoracic disc disorder with myelopathy, CAD status post MI with PCI and stent placement, GERD, hypertension, hyperlipidemia, nephrolithiasis, obesity status post gastric bypass, sleep apnea compliant with CPAP.    Past Medical History:   Diagnosis Date    Anxiety     Arthritis     ASHD (arteriosclerotic heart disease)     Coronary artery disease     Depression     GERD (gastroesophageal reflux disease)     HL (hearing loss)     Hyperlipidemia     Hypertension     Lumbar radiculopathy     Myocardial infarction (Multi)     Nephrolithiasis     NSVT (nonsustained ventricular tachycardia) (Multi)     Obesity     S/P gastric bypass    Sleep apnea     Spinal stenosis     Thoracic myelopathy        Past Surgical History:   Procedure Laterality Date    BREAST LUMPECTOMY      BUNIONECTOMY      CARDIAC CATHETERIZATION  2020    CARPAL TUNNEL RELEASE      CERVICAL SPINE SURGERY  04/15/2024    ACDFP    COLONOSCOPY      GASTRIC BYPASS      KNEE ARTHROSCOPY W/ DEBRIDEMENT      LITHOTRIPSY      Renal Lithotripsy    LUMBAR LAMINECTOMY      NASAL SEPTUM SURGERY      OTHER SURGICAL HISTORY  2015    Hemilaminectomy    SHOULDER SURGERY      UPPER GASTROINTESTINAL ENDOSCOPY      VASECTOMY         Patient  has no history on file for sexual activity.    Family History   Problem  Relation Name Age of Onset    Coronary artery disease Mother      Coronary artery disease Father         Allergies   Allergen Reactions    Nitroglycerin Other     Arrhythmia / Heart Block       Prior to Admission medications    Medication Sig Start Date End Date Taking? Authorizing Provider   aspirin 81 mg EC tablet Take 1 tablet (81 mg) by mouth once daily.   Yes Historical Provider, MD   atorvastatin (Lipitor) 20 mg tablet Take 1 tablet (20 mg) by mouth once daily. 5/13/24  Yes Davonte Valdez MD   bromocriptine (Parlodel) 5 mg capsule Take 1 capsule (5 mg) by mouth once daily. 5/28/09  Yes Historical Provider, MD   calcium acetate (Phoslo) 667 mg tablet Take 1 tablet (667 mg) by mouth once daily.   Yes Historical Provider, MD   calcium polycarbophiL (FiberCon) 625 mg tablet Take 1 tablet (625 mg) by mouth once daily.   Yes Historical Provider, MD   cyanocobalamin (Vitamin B-12) 100 mcg tablet Take 1 tablet (100 mcg) by mouth once daily.   Yes Historical Provider, MD   cyclobenzaprine (Flexeril) 10 mg tablet Take 1 tablet (10 mg) by mouth 3 times a day as needed for muscle spasms (post op pain). 9/6/24  Yes Radha Ching MD   gabapentin (Neurontin) 100 mg capsule Take 1 capsule (100 mg) by mouth 3 times a day. 9/6/24 10/6/24 Yes Radha Ching MD   mirtazapine (Remeron) 15 mg tablet Take 1 tablet (15 mg) by mouth once daily at bedtime. 3/19/18  Yes Historical Provider, MD   multivitamin with minerals (multivit-min-iron fum-folic ac) tablet Take 1 tablet by mouth once daily. 9/16/15  Yes Historical Provider, MD   pantoprazole (Protonix) 40 mg packet Take 1 packet (40 mg) by mouth once daily in the morning. Take before meals.   Yes Historical Provider, MD   PROzac 20 mg capsule Take 1 capsule (20 mg) by mouth once daily.   Yes Historical Provider, MD   ranolazine (Ranexa) 500 mg 12 hr tablet Take 1 tablet (500 mg) by mouth every 12 hours. 5/13/24 5/13/25 Yes Davonte Valdez MD   tamsulosin (Flomax) 0.4 mg 24 hr  capsule Take 1 capsule (0.4 mg) by mouth once daily. 5/20/14  Yes Historical Provider, MD   acetaminophen (Tylenol) 325 mg tablet Take 3 tablets (975 mg) by mouth every 8 hours if needed for mild pain (1 - 3) (post op pain). 4/16/24   JEIMY Pollock   chlorhexidine (Hibiclens) 4 % external liquid Apply topically 2 times a day for 5 days. 9/26/24 10/1/24  Samantha A Meeson, APRN-CNP   chlorhexidine (Peridex) 0.12 % solution Swish and spit 15 mL night before surgery and morning of surgery 9/26/24   Samantha A Meeson, APRN-CNP        PAT ROS:   Constitutional:   neg    Neuro/Psych:    Right lower extremity numbness and tingling  Eyes:   neg     use of corrective lenses  Ears:   neg     hearing aides  Nose:   neg    Mouth:   neg    Throat:   neg    Neck:   neg    Cardio:   neg    Respiratory:   neg    Endocrine:   neg    GI:   neg    :   neg    Musculoskeletal:   neg    Hematologic:   neg    Skin:  neg        Physical Exam  Vitals reviewed.   Constitutional:       Appearance: Normal appearance.      Comments: Beard and mustache   HENT:      Head: Normocephalic.      Nose: Nose normal.      Mouth/Throat:      Mouth: Mucous membranes are moist.   Eyes:      Conjunctiva/sclera: Conjunctivae normal.   Neck:      Vascular: No carotid bruit.   Cardiovascular:      Rate and Rhythm: Normal rate and regular rhythm.      Pulses: Normal pulses.      Heart sounds: Normal heart sounds.   Pulmonary:      Effort: Pulmonary effort is normal.      Breath sounds: Normal breath sounds.   Abdominal:      Palpations: Abdomen is soft.      Tenderness: There is no abdominal tenderness.   Musculoskeletal:         General: Normal range of motion.      Cervical back: Normal range of motion.      Right lower leg: No edema.      Left lower leg: No edema.   Lymphadenopathy:      Cervical: No cervical adenopathy.   Skin:     General: Skin is warm and dry.      Capillary Refill: Capillary refill takes less than 2 seconds.   Neurological:       General: No focal deficit present.      Mental Status: He is alert and oriented to person, place, and time.   Psychiatric:         Mood and Affect: Mood normal.         Behavior: Behavior normal. Behavior is cooperative.         Thought Content: Thought content normal.         Judgment: Judgment normal.          PAT AIRWAY:   Airway:     Mallampati::  III    Neck ROM::  Full      Visit Vitals  /73   Pulse 64   Temp 36.7 °C (98.1 °F)       DASI Risk Score      Flowsheet Row Pre-Admission Testing from 9/26/2024 in [facility] Pre-Admission Testing from 4/1/2024 in [facility]   Can you take care of yourself (eat, dress, bathe, or use toilet)?  2.75 filed at 09/26/2024 0925 2.75 filed at 04/01/2024 1347   Can you walk indoors, such as around your house? 1.75 filed at 09/26/2024 0925 1.75 filed at 04/01/2024 1347   Can you walk a block or two on level ground?  2.75 filed at 09/26/2024 0925 2.75 filed at 04/01/2024 1347   Can you climb a flight of stairs or walk up a hill? 5.5 filed at 09/26/2024 0925 5.5 filed at 04/01/2024 1347   Can you run a short distance? 8 filed at 09/26/2024 0925 8 filed at 04/01/2024 1347   Can you do light work around the house like dusting or washing dishes? 2.7 filed at 09/26/2024 0925 2.7 filed at 04/01/2024 1347   Can you do moderate work around the house like vacuuming, sweeping floors or carrying groceries? 3.5 filed at 09/26/2024 0925 3.5 filed at 04/01/2024 1347   Can you do heavy work around the house like scrubbing floors or lifting and moving heavy furniture?  8 filed at 09/26/2024 0925 0 filed at 04/01/2024 1347   Can you do yard work like raking leaves, weeding or pushing a mower? 4.5 filed at 09/26/2024 0925 4.5 filed at 04/01/2024 1347   Can you have sexual relations? 5.25 filed at 09/26/2024 0925 5.25 filed at 04/01/2024 1347   Can you participate in moderate recreational activities like golf, bowling, dancing, doubles tennis or throwing a  baseball or football? 0 filed at 09/26/2024 0925 0 filed at 04/01/2024 1347   Can you participate in strenous sports like swimming, singles tennis, football, basketball, or skiing? 0 filed at 09/26/2024 0925 0 filed at 04/01/2024 1347   DASI SCORE 44.7 filed at 09/26/2024 0925 36.7 filed at 04/01/2024 1347   METS Score (Will be calculated only when all the questions are answered) 8.2 filed at 09/26/2024 0925 7.3 filed at 04/01/2024 1347          Caprini DVT Assessment      Flowsheet Row Pre-Admission Testing from 9/26/2024 in [facility] Admission (Discharged) from 4/15/2024 in [facility] with Davonte Mauricio MD   DVT Score 7 filed at 09/26/2024 0943 3 filed at 04/15/2024 1252   Surgical Factors Major surgery planned, including arthroscopic and laproscopic (1-2 hours) filed at 09/26/2024 0943 --   BMI 31-40 (Obesity) filed at 09/26/2024 0943 30 or less filed at 04/15/2024 1252   RETIRED: Age -- 60-75 years filed at 04/15/2024 1252          Modified Frailty Index      Flowsheet Row Pre-Admission Testing from 9/26/2024 in [facility]   Non-independent functional status (problems with dressing, bathing, personal grooming, or cooking) 0 filed at 09/26/2024 0944   History of diabetes mellitus  0 filed at 09/26/2024 0944   History of COPD 0 filed at 09/26/2024 0944   History of CHF No filed at 09/26/2024 0944   History of MI 0.0909 filed at 09/26/2024 0944   History of Percutaneous Coronary Intervention, Cardiac Surgery, or Angina 0.0909 filed at 09/26/2024 0944   Hypertension requiring the use of medication  0.0909 filed at 09/26/2024 0944   Peripheral vascular disease 0 filed at 09/26/2024 0944   Impaired sensorium (cognitive impairement or loss, clouding, or delirium) 0 filed at 09/26/2024 0944   TIA or CVA withouy residual deficit 0 filed at 09/26/2024 0944   Cerebrovascular accident with deficit 0 filed at 09/26/2024 0944   Modified Frailty Index Calculator .2727 filed  at 09/26/2024 0944          CHADS2 Stroke Risk  Current as of 8 minutes ago        N/A 3 to 100%: High Risk   2 to < 3%: Medium Risk   0 to < 2%: Low Risk     Last Change: N/A          This score determines the patient's risk of having a stroke if the patient has atrial fibrillation.        This score is not applicable to this patient. Components are not calculated.          Revised Cardiac Risk Index      Flowsheet Row Pre-Admission Testing from 9/26/2024 in [facility]   High-Risk Surgery (Intraperitoneal, Intrathoracic,Suprainguinal vascular) 0 filed at 09/26/2024 0944   History of ischemic heart disease (History of MI, History of positive exercuse test, Current chest paint considered due to myocardial ischemia, Use of nitrate therapy, ECG with pathological Q Waves) 1 filed at 09/26/2024 0944   History of congestive heart failure (pulmonary edemia, bilateral rales or S3 gallop, Paroxysmal nocturnal dyspnea, CXR showing pulmonary vascular redistribution) 0 filed at 09/26/2024 0944   History of cerebrovascular disease (Prior TIA or stroke) 0 filed at 09/26/2024 0944   Pre-operative insulin treatment 0 filed at 09/26/2024 0944   Pre-operative creatinine>2 mg/dl 0 filed at 09/26/2024 0944   Revised Cardiac Risk Calculator 1 filed at 09/26/2024 0944          Apfel Simplified Score      Flowsheet Row Pre-Admission Testing from 9/26/2024 in [facility]   Smoking status 1 filed at 09/26/2024 0944   History of motion sickness or PONV  0 filed at 09/26/2024 0944   Use of postoperative opioids 1 filed at 09/26/2024 0944   Gender - Female 0=No filed at 09/26/2024 0944   Apfel Simplified Score Calculator 2 filed at 09/26/2024 0944          Risk Analysis Index Results This Encounter    No data found in the last 10 encounters.       Stop Bang Score      Flowsheet Row Pre-Admission Testing from 9/26/2024 in [facility]   Do you snore loudly? 1 filed at 09/26/2024 0925   Do you  often feel tired or fatigued after your sleep? 1 filed at 09/26/2024 0925   Has anyone ever observed you stop breathing in your sleep? 1 filed at 09/26/2024 0925   Do you have or are you being treated for high blood pressure? 0 filed at 09/26/2024 0925   Recent BMI (Calculated) 33.5 filed at 09/26/2024 0925   Is BMI greater than 35 kg/m2? 0=No filed at 09/26/2024 0925   Age older than 50 years old? 1=Yes filed at 09/26/2024 0925   Is your neck circumference greater than 17 inches (Male) or 16 inches (Female)? 1 filed at 09/26/2024 0925   Gender - Male 1=Yes filed at 09/26/2024 0925   STOP-BANG Total Score 6 filed at 09/26/2024 0925               Assessment and Plan:   Neuro: Anxiety and depression managed on mirtazapine (continue) and Prozac (continue).    The patient is at an increased risk for post operative delirium secondary to age >/= 65, depression, and type and duration of surgery.  Preoperative brain exercise educational handout provided to patient.    The patient is at an increased risk for perioperative stroke secondary to cardiac disease, preoperative interrupton of antithrombotic, increased age, HTN, HLD, general anesthesia, and op time >2.5 hours.    HEENT/Airway:  No diagnosis or significant findings on chart review or clinical presentation and evaluation.     Cardiovascular:  CAD status post MI with PCI and stent placement managed on atorvastatin (continue), ranolazine (continue) and 81 mg daily aspirin (continue).    Hypertension not currently requiring medication.  Hyperlipidemia managed on atorvastatin (continue).     EKG 08/14/2024 normal sinus rhythm    Stress test 02/28/22  IMPRESSION:  1. Normal stress myocardial perfusion imaging in response to  pharmacologic stress.  2. Well-maintained left ventricular function.  50%    Echo 02/12/22  CONCLUSIONS:   1. The left ventricular systolic function is normal with a 55-60% estimated ejection fraction.   2. RVSP within normal limits.    Cardiac cath  2020  CONCLUSIONS:   1. 70% stenosis of proximal second diagonal.   2. Mild LAD, circumflex and RCA disease with patent RCA stents.   3. Minimally elevated left heart filling pressures.    Patient follows with cardiology, Dr. Davonte Valdez, last visit 2024.  Patient stable and to follow-up in 3 months. Risk stratification scanned into media tab 24 and patient is a moderately increased risk but is medically optimized for surgery okay to hold aspirin 7 days prior.    No additional preoperative testing is currently indicated.    METS are 8.2    RCRI  1 which is 6% 30 day risk of MACE (risk for cardiac death, nonfatal myocardial infarction, and nonfactal cardiac arrest    JOEY score which indicates a 0.1% risk of intraoperative or 30-day postoperative MACE      Pulmonary:   sleep apnea compliant with CPAP.  Preoperative deep breathing educational handout provided to patient.    ARISCAT:   26   points which is a intermediate (13.3%) risk of in-hospital post-op pulmonary complications     PRODIGY:  25  points which is a high risk of post op opioid induced respiratory depression episodes    STOP BAN  points which is a high risk for moderate to severe JOSE    Renal: Nephrolithiasis managed on tamsulosin (continue).    No diagnosis or significant findings on chart review or clinical presentation and evaluation, however, the patient is at increased risk of perioperative renal complications secondary to age>/= 56, male sex, and HTN. Preventative measures include preoperative BP control and hydration.    Endocrine:  No diagnosis or significant findings on chart review or clinical presentation and evaluation.    Hematologic:   No diagnosis or significant findings on chart review or clinical presentation and evaluation however see below risk screening tool.  Preoperative DVT educational handout provided to patient.    Caprini Score:  7  points which is a highest risk of perioperative  VTE    Gastrointestinal:  GERD managed on pantoprazole (continue). obesity status post gastric bypass managed on multiple oral supplementations (hold day of surgery)    EAT-10 score of   0   - self-perceived oropharyngeal dysphagia scale (0-40)     Apfel: 2 points 39% risk for post operative N/V    Infectious disease:  No diagnosis or significant findings on chart review or clinical presentation and evaluation.     Musculoskeletal:  osteoarthritis, lumbar spinal stenosis with neurogenic claudication and intervertebral thoracic disc disorder with myelopathy scheduled for surgery.  Patient managed on acetaminophen (continue), cyclobenzaprine (continue) and gabapentin (continue).      Labs ordered  Recent Results (from the past 168 hour(s))   CBC and Auto Differential    Collection Time: 09/26/24  9:53 AM   Result Value Ref Range    WBC 6.2 4.4 - 11.3 x10*3/uL    nRBC 0.0 0.0 - 0.0 /100 WBCs    RBC 4.01 (L) 4.50 - 5.90 x10*6/uL    Hemoglobin 13.1 (L) 13.5 - 17.5 g/dL    Hematocrit 39.4 (L) 41.0 - 52.0 %    MCV 98 80 - 100 fL    MCH 32.7 26.0 - 34.0 pg    MCHC 33.2 32.0 - 36.0 g/dL    RDW 12.9 11.5 - 14.5 %    Platelets 192 150 - 450 x10*3/uL    Neutrophils % 52.4 40.0 - 80.0 %    Immature Granulocytes %, Automated 0.2 0.0 - 0.9 %    Lymphocytes % 33.9 13.0 - 44.0 %    Monocytes % 8.6 2.0 - 10.0 %    Eosinophils % 3.9 0.0 - 6.0 %    Basophils % 1.0 0.0 - 2.0 %    Neutrophils Absolute 3.24 1.60 - 5.50 x10*3/uL    Immature Granulocytes Absolute, Automated 0.01 0.00 - 0.50 x10*3/uL    Lymphocytes Absolute 2.09 0.80 - 3.00 x10*3/uL    Monocytes Absolute 0.53 0.05 - 0.80 x10*3/uL    Eosinophils Absolute 0.24 0.00 - 0.40 x10*3/uL    Basophils Absolute 0.06 0.00 - 0.10 x10*3/uL   Coagulation Screen    Collection Time: 09/26/24  9:53 AM   Result Value Ref Range    Protime 12.5 9.8 - 12.8 seconds    INR 1.1 0.9 - 1.1    aPTT 35 27 - 38 seconds   Type And Screen    Collection Time: 09/26/24  9:53 AM   Result Value Ref Range     ABO TYPE O     Rh TYPE POS     ANTIBODY SCREEN NEG    Basic Metabolic Panel    Collection Time: 09/26/24  9:53 AM   Result Value Ref Range    Glucose 76 74 - 99 mg/dL    Sodium 139 136 - 145 mmol/L    Potassium 3.9 3.5 - 5.3 mmol/L    Chloride 105 98 - 107 mmol/L    Bicarbonate 26 21 - 32 mmol/L    Anion Gap 12 10 - 20 mmol/L    Urea Nitrogen 20 6 - 23 mg/dL    Creatinine 0.95 0.50 - 1.30 mg/dL    eGFR 85 >60 mL/min/1.73m*2    Calcium 9.2 8.6 - 10.6 mg/dL   Hemoglobin A1C    Collection Time: 09/26/24  9:53 AM   Result Value Ref Range    Hemoglobin A1C 5.6 See comment %    Estimated Average Glucose 114 Not Established mg/dL   Staphylococcus aureus/MRSA colonization, Culture    Collection Time: 09/26/24  9:53 AM    Specimen: Nares/Axilla/Groin; Swab   Result Value Ref Range    Staph/MRSA Screen Culture No Staphylococcus aureus isolated    Urinalysis with Reflex Culture and Microscopic    Collection Time: 09/26/24  9:53 AM   Result Value Ref Range    Color, Urine Yellow Light-Yellow, Yellow, Dark-Yellow    Appearance, Urine Clear Clear    Specific Gravity, Urine 1.020 1.005 - 1.035    pH, Urine 5.5 5.0, 5.5, 6.0, 6.5, 7.0, 7.5, 8.0    Protein, Urine NEGATIVE NEGATIVE, 10 (TRACE), 20 (TRACE) mg/dL    Glucose, Urine Normal Normal mg/dL    Blood, Urine NEGATIVE NEGATIVE    Ketones, Urine NEGATIVE NEGATIVE mg/dL    Bilirubin, Urine NEGATIVE NEGATIVE    Urobilinogen, Urine Normal Normal mg/dL    Nitrite, Urine NEGATIVE NEGATIVE    Leukocyte Esterase, Urine NEGATIVE NEGATIVE   Extra Urine Gray Tube    Collection Time: 09/26/24  9:53 AM   Result Value Ref Range    Extra Tube Hold for add-ons.        IMPRESSION:  Low lung volumes but no evidence of acute intrathoracic abnormality.

## 2024-09-27 ENCOUNTER — TREATMENT (OUTPATIENT)
Dept: PHYSICAL THERAPY | Facility: CLINIC | Age: 74
End: 2024-09-27
Payer: MEDICARE

## 2024-09-27 DIAGNOSIS — M48.062 LUMBAR STENOSIS WITH NEUROGENIC CLAUDICATION: Primary | ICD-10-CM

## 2024-09-27 LAB
HOLD SPECIMEN: NORMAL
STAPHYLOCOCCUS SPEC CULT: NORMAL

## 2024-09-27 PROCEDURE — 97113 AQUATIC THERAPY/EXERCISES: CPT | Mod: GP

## 2024-09-27 NOTE — PROGRESS NOTES
PHYSICAL THERAPY TREATMENT NOTE    Patient Name:  Misael Lopez   Patient MRN: [id number]  Date: 09/27/24  Time Calculation  Start Time: 1230  Stop Time: 1310  Time Calculation (min): 40 min      Insurance:  Visit number: 4 of 20  Insurance Type: [employer]  Authorization or Plan of Care date Range: 9/9/25    Therapy diagnoses:   1. Lumbar stenosis with neurogenic claudication  Follow Up In Physical Therapy           General:  Reason for visit: pre surgical lumbar stenosis     Assessment:  Patient is a 74 year old with a diagnosis of lumbar stenosis.   BASELINES: poor posture, trendelenburg gait, poor balance, positive slump test, tenderness, pain   TREATMENT STRATEGIES: aquatic therapy for LE strengthening and mobility  RESPONSE TO TX: Pt tolerated Tx well. Enjoys UE and deep water exercises.     Plan:  Step ups, squats     Precautions:  Hx of lumbar and cervical surgeries, MI with stents   Fall Risk: high    Subjective:   Patient reports that he feels okay coming in today. Feels good in the water. Planning to have the surgery on 10/9 as long as the insurance goes through.    Pain (0-10): 5-6   HEP adherence / understanding: going well, no concerns.     Objective:  Antalgic gait       Treatment Performed: * indicates new exercises for HEP     Aquatic:   Chest height water:  walking forward / backwards x 4 laps  Side stepping x 3 laps  Marching forward / backwards   Standing hip flexion, abduction, extension 20x ea  Heel raises/toe raises x20 ea      Rows purple dumbells 20x  Shoulder abd / add 20x  Shoulder horizitonal abd / add 20x  Shoulder flex/ext 20x      Deep water  Noodle under arms-  Bicycle 2 minutes  Hip abd / add 20x  Alt ham curls 20x  Skiier                  
Yes

## 2024-09-30 ENCOUNTER — TREATMENT (OUTPATIENT)
Dept: PHYSICAL THERAPY | Facility: CLINIC | Age: 74
End: 2024-09-30
Payer: MEDICARE

## 2024-09-30 DIAGNOSIS — M48.062 LUMBAR STENOSIS WITH NEUROGENIC CLAUDICATION: ICD-10-CM

## 2024-09-30 PROCEDURE — 97113 AQUATIC THERAPY/EXERCISES: CPT | Mod: GP

## 2024-09-30 NOTE — PROGRESS NOTES
"                                                                                                                         PHYSICAL THERAPY TREATMENT NOTE    Patient Name:  Misael Lopez   Patient MRN: [id number]  Date: 09/30/24  Time Calculation  Start Time: 1300  Stop Time: 1345  Time Calculation (min): 45 min      Insurance:  Visit number: 4 of 20  Insurance Type: [employer]  Authorization or Plan of Care date Range: 9/9/25    Therapy diagnoses:   1. Lumbar stenosis with neurogenic claudication  Follow Up In Physical Therapy             General:  Reason for visit: pre surgical lumbar stenosis     Assessment:  Patient is a 74 year old with a diagnosis of lumbar stenosis.   BASELINES: poor posture, trendelenburg gait, poor balance, positive slump test, tenderness, pain   TREATMENT STRATEGIES: aquatic therapy for LE strengthening and mobility  RESPONSE TO TX: PT able to complete all exercises with minimal complaints of pain. Demonstrates ability to complete SL balance activities with minimal difficulty. He continues to have significant pain after leaving pool, with minimal long term benefits for pain relief from therapy.     Plan:  Step ups, squats     Precautions:  Hx of lumbar and cervical surgeries, MI with stents   Fall Risk: high    Subjective:   Patient reports that he is feeling about the same. Ready to have his surgery as long as it is approved by     Pain (0-10): 5-6   HEP adherence / understanding: going well, no concerns.     Objective:  Antalgic gait       Treatment Performed: * indicates new exercises for HEP     Aquatic:   Chest height water:  walking forward / backwards x 4 laps  Side stepping x 3 laps  Marching forward / backwards   Standing hip flexion, abduction, extension 20x ea  Heel raises/toe raises x20 ea   Squats x 20 ea   SL balance with noodle 3 x 30\"    Rows purple dumbells 20x  Shoulder abd / add 20x  Shoulder horizitonal abd / add 20x  Shoulder flex/ext 20x      Deep water  Noodle under " arms-  Bicycle 2 minutes  Hip abd / add 20x  Alt ham curls 20x  Skiier

## 2024-10-02 ENCOUNTER — TREATMENT (OUTPATIENT)
Dept: PHYSICAL THERAPY | Facility: CLINIC | Age: 74
End: 2024-10-02
Payer: MEDICARE

## 2024-10-02 DIAGNOSIS — M48.062 LUMBAR STENOSIS WITH NEUROGENIC CLAUDICATION: ICD-10-CM

## 2024-10-02 PROCEDURE — 97113 AQUATIC THERAPY/EXERCISES: CPT | Mod: GP,CQ

## 2024-10-02 NOTE — PROGRESS NOTES
"                                                                PHYSICAL THERAPY TREATMENT NOTE    Patient Name:  Misael Lopez   Patient MRN: [id number]  Date: 10/02/24  Time Calculation  Start Time: 0148  Stop Time: 0235  Time Calculation (min): 47 min      Insurance:  Visit number: 5 of 20  Insurance Type: [employer]  Authorization or Plan of Care date Range: 9/9/25    Therapy diagnoses:   1. Lumbar stenosis with neurogenic claudication  Follow Up In Physical Therapy             General:  Reason for visit: pre surgical lumbar stenosis     Assessment:  Patient is a 74 year old with a diagnosis of lumbar stenosis.   BASELINES: poor posture, trendelenburg gait, poor balance, positive slump test, tenderness, pain   TREATMENT STRATEGIES: aquatic therapy for LE strengthening and mobility  RESPONSE TO TX: Added step exercises w/ minor c/o R buttock and LE pain during forward step-ups. Pt requires vc to perform proper squat form and keep heels on ground. Pain reduced in deep water, but returns when exiting pool.     Plan:  Step ups, squats     Precautions:  Hx of lumbar and cervical surgeries, MI with stents   Fall Risk: high    Subjective:   Pt reports 6/10 R sided back and buttock pain today.    Pain (0-10): 5-6   HEP adherence / understanding: going well, no concerns.     Objective:  Antalgic gait       Treatment Performed: * indicates new exercises for HEP     Aquatic: 47 minutes  Chest height water:  walking forward / backwards x 4 laps  Side stepping x 3 laps  Marching forward / backwards   Standing hip flexion, abduction, extension 20x ea  Hip circumduction 20x ea   Heel raises/toe raises x20 ea   Squats x 20 ea   SL balance with noodle 3 x 30\"    Rows purple dumbells 20x  Shoulder abd / add 20x  Shoulder horizitonal abd / add 20x  Shoulder flex/ext 20x     Red step:  Forward step-up 20x  Lateral step-up 20x  Step dips 20x  Step downs 20x     Deep water  Noodle under arms-  Bicycle 2 minutes  Hip abd / add 20x  Alt ham " curls 20x  Skiier

## 2024-10-07 ENCOUNTER — TREATMENT (OUTPATIENT)
Dept: PHYSICAL THERAPY | Facility: CLINIC | Age: 74
End: 2024-10-07
Payer: MEDICARE

## 2024-10-07 DIAGNOSIS — M48.062 LUMBAR STENOSIS WITH NEUROGENIC CLAUDICATION: Primary | ICD-10-CM

## 2024-10-07 PROCEDURE — 97530 THERAPEUTIC ACTIVITIES: CPT | Mod: GP

## 2024-10-07 NOTE — PROGRESS NOTES
Physical Therapy    Physical Therapy Re-Evaluation and Discharge     Patient Name: Misael Lopez  MRN: [id number]  Today's Date: 10/7/2024  Time Calculation  Start Time: 1130  Stop Time: 1145  Time Calculation (min): 15 min    Insurance - reviewed   Visit number: 7 (updated 10/07/24)   Payor: [employer] / Plan: [employer] [employer] / Product Type: *No Product type* /         Referring Provider: Radha Ching MD   Surgery: N/A     Days since surgery: N/A       Assessment:      Misael Lopez  is a 74 y.o. old patient who participated in a physical therapy re-evaluation today due to LBP. Misael presents with signs and symptoms consistent with lumbar stenosis. Misael's current impairments include: balance deficits, gait deficits, postural deficits, pain, and decreased functional mobility.   Due to these impairments, he has the following functional limitations and participation restrictions:  increased fall risk, difficulty with ambulation, difficulty with stair negotiation, difficulty performing transfers, difficulty performing recreational activities, difficulty with completing/performing some ADLs/IADLs, and increased time to complete ADLs/IADLs. Misael's clinical presentation is Stable and/or uncomplicated characteristics. Misael's progressing towards goals.  Patient will be discharged from therapy today. Is scheduled to have surgery on 10/9/24. Is planning to come back to [facility] for post surgical PT. Pt has continued to show good LE strength and ROM. Main concern is still balance and stability. Pt left session with all questions answered.     Plan:  Discharge from PT today 10/7/24.           Current Problem:   Problem List Items Addressed This Visit             ICD-10-CM    Lumbar stenosis with neurogenic claudication - Primary M48.062         Subjective    Subjective:  Pt reports that he feels sore coming in today. Pain and discomfort is mostly in the R sided low back. Surgery scheduled for 10/9. Pt is  planning to come to [place] for PT post procedure. Reports that he still does not like to sit for extended periods of time, would prefer to stand or walk. Reports that he periodically feels unsteady when walking. States that the shooting pain down the R leg has improved somewhat but will still occasionally happen. Reports that he has the most trouble on the stairs.     Functional Limitations: sitting long periods of time, squatting, lifting, stairs       Pain:  6/10  pain location: R side low back      Pain description: sore       Objective Measures:   Objective     Posture: improved, mild rounded shoulders     Gait: mild trendelenburg, L lean, antalgic      Squat: mild discomfort     Balance: single leg- 15s on L, 8s on R    ROM: (WFL unless otherwise noted)   Trunk flexion: WFL- pain   Trunk extension: WFL- pain   R hip flexion:  L hip flexion:  R hip abduction:  L hip abduction:  R hip extension:  L hip extension:     R knee flexion:  L knee flexion:  R knee extension:  L knee extension:     MMT:  R hip flexion: 5  L hip flexion: 5  R hip abduction: 5  L hip abduction: 5  R hip extension: 5  L hip extension: 5    R knee flexion: 5  L knee flexion: 5  R knee extension: 5  L knee extension: 5    R ankle DF: 5  L ankle DF: 5      Special Tests: slump (+) on R, (-) on L     Tenderness: B QL, lower thoracic and lumbar paraspinals     Joint Play: stiff throughout lower thoracic and lumbar spine         Treatments:      Therapeutic Activity: 15 minutes  - Objective Measures  - Discussed returning for PT post surgery          EDUCATION:     -Educated Misael  on the role of PT and PT POC  -Educated Misael regarding benefit and purpose of skilled PT services along with results of examination findings and how this correlates to their chief complaint.   -Answered Misael's questions in full.  -Educated Misael on relevant anatomy and the rationale for exercises  -Misael Lopez advised to hold any ex if it increases pain, Misael HALL  Jessica verbalized understanding    Goals:    STG's (within 2 weeks)  Misael Lopez will decrease pain by >/= 2/10 points from baseline to improve ability to perform household/recreational activities. NOT MET     Misael Lopez will be able to sleep through the night with less than 2 interruptions due to pain in order to improve mental and physical well-being in order to safely participate in ADLs. MET      Misael Lopez will demonstrate independence,  excellent understanding of and report compliance with at least 3 exercises in his HEP to facilitate the learning process to maximize PT benefits and to facilitate independent rehab program upon discharge. MET     LTG's (by discharge)     Misael will demonstrate ascending and descending >/= 12 step with alternating pattern and no pain. NOT MET     Misael will demonstrate ambulating >/=1000 ft with proper body mechanics and gait pattern and no reports of pain. PROGRESSING     Misael Lopez will decrease pain to 0-2/10 to improve ability to perform household/recreational activities. NOT MET      Misael Lopez will be able to sleep through the night without waking due to pain in order to improve mental and physical well-being in order to safely participate in ADLs. MET      Misael Lopez will demonstrate independence,  excellent understanding of and report compliance with HEP to facilitate the learning process to maximize PT benefits and to facilitate independent rehab program upon discharge. MET     Time Calculation  Start Time: 1130  Stop Time: 1145  Time Calculation (min): 15 min     PT Therapeutic Procedures Time Entry  Therapeutic Activity Time Entry: 15

## 2024-10-08 ENCOUNTER — ANESTHESIA EVENT (OUTPATIENT)
Dept: OPERATING ROOM | Facility: HOSPITAL | Age: 74
End: 2024-10-08
Payer: MEDICARE

## 2024-10-08 SDOH — HEALTH STABILITY: MENTAL HEALTH: CURRENT SMOKER: 0

## 2024-10-08 NOTE — PROGRESS NOTES
Pharmacy Medication History Review    Misael Lopez is a 74 y.o. male who is planned to be admitted for No Principal Problem: There is no principal problem currently on the Problem List. Please update the Problem List and refresh.. Pharmacy called the patient prior to their scheduled procedure and reviewed the patient's hflon-ss-[id number] medications for accuracy.    Medications ADDED:  none  Medications CHANGED:  Fluoxetine 20mg directions from #1QD to #3QD  Tamsulosin 0.4mg directions from #1QD to #1BID  Medications REMOVED:   none    Please review updated prior to admission medication list and comments regarding how patient may be taking medications differently by going to Admission tab --> Admission Orders --> Admit Orders / Review prior to admission medications.     Preferred pharmacy and allergies to be confirmed with patient by nursing the day of procedure.     Sources used to complete the med history include spoke with patient's spouse (ari), surescripts, oarrs, care everywhere    Below are additional concerns with the patient's PTA list.  Patient states they take fibercon every day  Patient confirmed they take #3 fluoxetine 20mg every day  Patient states they have been taking tamsulosin twice daily    Roseann Chen    [facility]  Please reach out via Secure Chat for questions

## 2024-10-09 ENCOUNTER — ANESTHESIA (OUTPATIENT)
Dept: OPERATING ROOM | Facility: HOSPITAL | Age: 74
End: 2024-10-09
Payer: MEDICARE

## 2024-10-09 ENCOUNTER — APPOINTMENT (OUTPATIENT)
Dept: RADIOLOGY | Facility: HOSPITAL | Age: 74
End: 2024-10-09
Payer: MEDICARE

## 2024-10-09 ENCOUNTER — HOSPITAL ENCOUNTER (INPATIENT)
Facility: HOSPITAL | Age: 74
LOS: 3 days | Discharge: HOME | End: 2024-10-12
Attending: STUDENT IN AN ORGANIZED HEALTH CARE EDUCATION/TRAINING PROGRAM | Admitting: STUDENT IN AN ORGANIZED HEALTH CARE EDUCATION/TRAINING PROGRAM
Payer: MEDICARE

## 2024-10-09 ENCOUNTER — APPOINTMENT (OUTPATIENT)
Dept: NEUROLOGY | Facility: HOSPITAL | Age: 74
End: 2024-10-09
Payer: MEDICARE

## 2024-10-09 DIAGNOSIS — I20.9 ANGINA PECTORIS: ICD-10-CM

## 2024-10-09 DIAGNOSIS — G89.18 ACUTE POST-OPERATIVE PAIN: ICD-10-CM

## 2024-10-09 DIAGNOSIS — M48.062 LUMBAR STENOSIS WITH NEUROGENIC CLAUDICATION: Primary | ICD-10-CM

## 2024-10-09 DIAGNOSIS — M51.04 INTERVERTEBRAL THORACIC DISC DISORDER WITH MYELOPATHY, THORACIC REGION: ICD-10-CM

## 2024-10-09 PROBLEM — G62.9 PERIPHERAL NEUROPATHY: Status: ACTIVE | Noted: 2024-10-09

## 2024-10-09 PROBLEM — F43.10 PTSD (POST-TRAUMATIC STRESS DISORDER): Status: ACTIVE | Noted: 2024-10-09

## 2024-10-09 LAB
ABO GROUP (TYPE) IN BLOOD: NORMAL
ANION GAP BLDA CALCULATED.4IONS-SCNC: 8 MMO/L (ref 10–25)
ANION GAP BLDA CALCULATED.4IONS-SCNC: 9 MMO/L (ref 10–25)
BASE EXCESS BLDA CALC-SCNC: -0.2 MMOL/L (ref -2–3)
BASE EXCESS BLDA CALC-SCNC: 3 MMOL/L (ref -2–3)
BODY TEMPERATURE: 37 DEGREES CELSIUS
BODY TEMPERATURE: 37 DEGREES CELSIUS
CA-I BLDA-SCNC: 1.19 MMOL/L (ref 1.1–1.33)
CA-I BLDA-SCNC: 1.21 MMOL/L (ref 1.1–1.33)
CHLORIDE BLDA-SCNC: 105 MMOL/L (ref 98–107)
CHLORIDE BLDA-SCNC: 107 MMOL/L (ref 98–107)
GLUCOSE BLDA-MCNC: 105 MG/DL (ref 74–99)
GLUCOSE BLDA-MCNC: 151 MG/DL (ref 74–99)
HCO3 BLDA-SCNC: 24.8 MMOL/L (ref 22–26)
HCO3 BLDA-SCNC: 27.1 MMOL/L (ref 22–26)
HCT VFR BLD EST: 38 % (ref 41–52)
HCT VFR BLD EST: 38 % (ref 41–52)
HGB BLDA-MCNC: 12.5 G/DL (ref 13.5–17.5)
HGB BLDA-MCNC: 12.8 G/DL (ref 13.5–17.5)
INHALED O2 CONCENTRATION: 42 %
INHALED O2 CONCENTRATION: 76 %
LACTATE BLDA-SCNC: 0.9 MMOL/L (ref 0.4–2)
LACTATE BLDA-SCNC: 1.3 MMOL/L (ref 0.4–2)
OXYHGB MFR BLDA: 97.5 % (ref 94–98)
OXYHGB MFR BLDA: 97.7 % (ref 94–98)
PCO2 BLDA: 39 MM HG (ref 38–42)
PCO2 BLDA: 41 MM HG (ref 38–42)
PH BLDA: 7.39 PH (ref 7.38–7.42)
PH BLDA: 7.45 PH (ref 7.38–7.42)
PO2 BLDA: 194 MM HG (ref 85–95)
PO2 BLDA: 307 MM HG (ref 85–95)
POTASSIUM BLDA-SCNC: 3.8 MMOL/L (ref 3.5–5.3)
POTASSIUM BLDA-SCNC: 4.1 MMOL/L (ref 3.5–5.3)
RH FACTOR (ANTIGEN D): NORMAL
SAO2 % BLDA: 100 % (ref 94–100)
SAO2 % BLDA: 99 % (ref 94–100)
SODIUM BLDA-SCNC: 136 MMOL/L (ref 136–145)
SODIUM BLDA-SCNC: 137 MMOL/L (ref 136–145)

## 2024-10-09 PROCEDURE — 22614 ARTHRD PST TQ 1NTRSPC EA ADD: CPT | Performed by: STUDENT IN AN ORGANIZED HEALTH CARE EDUCATION/TRAINING PROGRAM

## 2024-10-09 PROCEDURE — 0RG70K1 FUSION OF 2 TO 7 THORACIC VERTEBRAL JOINTS WITH NONAUTOLOGOUS TISSUE SUBSTITUTE, POSTERIOR APPROACH, POSTERIOR COLUMN, OPEN APPROACH: ICD-10-PCS | Performed by: STUDENT IN AN ORGANIZED HEALTH CARE EDUCATION/TRAINING PROGRAM

## 2024-10-09 PROCEDURE — 2500000004 HC RX 250 GENERAL PHARMACY W/ HCPCS (ALT 636 FOR OP/ED): Performed by: ANESTHESIOLOGIST ASSISTANT

## 2024-10-09 PROCEDURE — 2500000001 HC RX 250 WO HCPCS SELF ADMINISTERED DRUGS (ALT 637 FOR MEDICARE OP): Performed by: STUDENT IN AN ORGANIZED HEALTH CARE EDUCATION/TRAINING PROGRAM

## 2024-10-09 PROCEDURE — 00NY0ZZ RELEASE LUMBAR SPINAL CORD, OPEN APPROACH: ICD-10-PCS | Performed by: STUDENT IN AN ORGANIZED HEALTH CARE EDUCATION/TRAINING PROGRAM

## 2024-10-09 PROCEDURE — 22842 INSERT SPINE FIXATION DEVICE: CPT | Performed by: STUDENT IN AN ORGANIZED HEALTH CARE EDUCATION/TRAINING PROGRAM

## 2024-10-09 PROCEDURE — 2500000001 HC RX 250 WO HCPCS SELF ADMINISTERED DRUGS (ALT 637 FOR MEDICARE OP): Performed by: ANESTHESIOLOGY

## 2024-10-09 PROCEDURE — 76937 US GUIDE VASCULAR ACCESS: CPT | Performed by: ANESTHESIOLOGY

## 2024-10-09 PROCEDURE — 2060000001 HC INTERMEDIATE ICU ROOM DAILY

## 2024-10-09 PROCEDURE — C1713 ANCHOR/SCREW BN/BN,TIS/BN: HCPCS | Performed by: STUDENT IN AN ORGANIZED HEALTH CARE EDUCATION/TRAINING PROGRAM

## 2024-10-09 PROCEDURE — P9045 ALBUMIN (HUMAN), 5%, 250 ML: HCPCS | Mod: JZ,JG | Performed by: ANESTHESIOLOGIST ASSISTANT

## 2024-10-09 PROCEDURE — 3600000017 HC OR TIME - EACH INCREMENTAL 1 MINUTE - PROCEDURE LEVEL SIX: Performed by: STUDENT IN AN ORGANIZED HEALTH CARE EDUCATION/TRAINING PROGRAM

## 2024-10-09 PROCEDURE — 22610 ARTHRD PST TQ 1NTRSPC THRC: CPT | Performed by: STUDENT IN AN ORGANIZED HEALTH CARE EDUCATION/TRAINING PROGRAM

## 2024-10-09 PROCEDURE — 3600000018 HC OR TIME - INITIAL BASE CHARGE - PROCEDURE LEVEL SIX: Performed by: STUDENT IN AN ORGANIZED HEALTH CARE EDUCATION/TRAINING PROGRAM

## 2024-10-09 PROCEDURE — 3700000001 HC GENERAL ANESTHESIA TIME - INITIAL BASE CHARGE: Performed by: STUDENT IN AN ORGANIZED HEALTH CARE EDUCATION/TRAINING PROGRAM

## 2024-10-09 PROCEDURE — 01NB0ZZ RELEASE LUMBAR NERVE, OPEN APPROACH: ICD-10-PCS | Performed by: STUDENT IN AN ORGANIZED HEALTH CARE EDUCATION/TRAINING PROGRAM

## 2024-10-09 PROCEDURE — 00QT0ZZ REPAIR SPINAL MENINGES, OPEN APPROACH: ICD-10-PCS | Performed by: STUDENT IN AN ORGANIZED HEALTH CARE EDUCATION/TRAINING PROGRAM

## 2024-10-09 PROCEDURE — 7100000002 HC RECOVERY ROOM TIME - EACH INCREMENTAL 1 MINUTE: Performed by: STUDENT IN AN ORGANIZED HEALTH CARE EDUCATION/TRAINING PROGRAM

## 2024-10-09 PROCEDURE — 0RB90ZZ EXCISION OF THORACIC VERTEBRAL DISC, OPEN APPROACH: ICD-10-PCS | Performed by: STUDENT IN AN ORGANIZED HEALTH CARE EDUCATION/TRAINING PROGRAM

## 2024-10-09 PROCEDURE — 2500000004 HC RX 250 GENERAL PHARMACY W/ HCPCS (ALT 636 FOR OP/ED): Performed by: STUDENT IN AN ORGANIZED HEALTH CARE EDUCATION/TRAINING PROGRAM

## 2024-10-09 PROCEDURE — 2500000005 HC RX 250 GENERAL PHARMACY W/O HCPCS: Performed by: ANESTHESIOLOGIST ASSISTANT

## 2024-10-09 PROCEDURE — C1763 CONN TISS, NON-HUMAN: HCPCS | Performed by: STUDENT IN AN ORGANIZED HEALTH CARE EDUCATION/TRAINING PROGRAM

## 2024-10-09 PROCEDURE — 36620 INSERTION CATHETER ARTERY: CPT | Performed by: ANESTHESIOLOGY

## 2024-10-09 PROCEDURE — 2720000007 HC OR 272 NO HCPCS: Performed by: STUDENT IN AN ORGANIZED HEALTH CARE EDUCATION/TRAINING PROGRAM

## 2024-10-09 PROCEDURE — 3700000002 HC GENERAL ANESTHESIA TIME - EACH INCREMENTAL 1 MINUTE: Performed by: STUDENT IN AN ORGANIZED HEALTH CARE EDUCATION/TRAINING PROGRAM

## 2024-10-09 PROCEDURE — 63046 LAM FACETEC & FORAMOT THRC: CPT | Performed by: STUDENT IN AN ORGANIZED HEALTH CARE EDUCATION/TRAINING PROGRAM

## 2024-10-09 PROCEDURE — 8E0WXBG COMPUTER ASSISTED PROCEDURE OF TRUNK REGION, WITH COMPUTERIZED TOMOGRAPHY: ICD-10-PCS | Performed by: STUDENT IN AN ORGANIZED HEALTH CARE EDUCATION/TRAINING PROGRAM

## 2024-10-09 PROCEDURE — 82435 ASSAY OF BLOOD CHLORIDE: CPT | Performed by: ANESTHESIOLOGIST ASSISTANT

## 2024-10-09 PROCEDURE — 82947 ASSAY GLUCOSE BLOOD QUANT: CPT | Performed by: ANESTHESIOLOGIST ASSISTANT

## 2024-10-09 PROCEDURE — 2500000005 HC RX 250 GENERAL PHARMACY W/O HCPCS: Performed by: STUDENT IN AN ORGANIZED HEALTH CARE EDUCATION/TRAINING PROGRAM

## 2024-10-09 PROCEDURE — 63055 DECOMPRESS SPINAL CORD THRC: CPT | Performed by: STUDENT IN AN ORGANIZED HEALTH CARE EDUCATION/TRAINING PROGRAM

## 2024-10-09 PROCEDURE — 36415 COLL VENOUS BLD VENIPUNCTURE: CPT | Performed by: ANESTHESIOLOGY

## 2024-10-09 PROCEDURE — 7100000001 HC RECOVERY ROOM TIME - INITIAL BASE CHARGE: Performed by: STUDENT IN AN ORGANIZED HEALTH CARE EDUCATION/TRAINING PROGRAM

## 2024-10-09 PROCEDURE — 2780000003 HC OR 278 NO HCPCS: Performed by: STUDENT IN AN ORGANIZED HEALTH CARE EDUCATION/TRAINING PROGRAM

## 2024-10-09 PROCEDURE — 61783 SCAN PROC SPINAL: CPT | Performed by: STUDENT IN AN ORGANIZED HEALTH CARE EDUCATION/TRAINING PROGRAM

## 2024-10-09 PROCEDURE — 2500000004 HC RX 250 GENERAL PHARMACY W/ HCPCS (ALT 636 FOR OP/ED): Performed by: ANESTHESIOLOGY

## 2024-10-09 PROCEDURE — 95939 C MOTOR EVOKED UPR&LWR LIMBS: CPT

## 2024-10-09 PROCEDURE — 01N80ZZ RELEASE THORACIC NERVE, OPEN APPROACH: ICD-10-PCS | Performed by: STUDENT IN AN ORGANIZED HEALTH CARE EDUCATION/TRAINING PROGRAM

## 2024-10-09 DEVICE — ROD, RELINE-O, 5.5 X 110MM, LORDOTIC: Type: IMPLANTABLE DEVICE | Site: BACK | Status: FUNCTIONAL

## 2024-10-09 DEVICE — SCREW, RELINE LOCK, 5.5MM OPEN TULIP: Type: IMPLANTABLE DEVICE | Site: BACK | Status: FUNCTIONAL

## 2024-10-09 DEVICE — DURAGEN® PLUS DURAL REGENERATION MATRIX, 1 IN X 1 IN (2.5 CM X 2.5 CM)
Type: IMPLANTABLE DEVICE | Site: BACK | Status: FUNCTIONAL
Brand: DURAGEN® PLUS

## 2024-10-09 DEVICE — SCREW, RELINE-O, 6.5X45MM 2S POLYAXIAL: Type: IMPLANTABLE DEVICE | Site: BACK | Status: FUNCTIONAL

## 2024-10-09 RX ORDER — MIRTAZAPINE 15 MG/1
15 TABLET, FILM COATED ORAL NIGHTLY
Status: DISCONTINUED | OUTPATIENT
Start: 2024-10-09 | End: 2024-10-12 | Stop reason: HOSPADM

## 2024-10-09 RX ORDER — HEPARIN SODIUM 5000 [USP'U]/ML
5000 INJECTION, SOLUTION INTRAVENOUS; SUBCUTANEOUS EVERY 8 HOURS
Status: DISCONTINUED | OUTPATIENT
Start: 2024-10-10 | End: 2024-10-12 | Stop reason: HOSPADM

## 2024-10-09 RX ORDER — DROPERIDOL 2.5 MG/ML
0.62 INJECTION, SOLUTION INTRAMUSCULAR; INTRAVENOUS ONCE AS NEEDED
Status: DISCONTINUED | OUTPATIENT
Start: 2024-10-09 | End: 2024-10-09 | Stop reason: HOSPADM

## 2024-10-09 RX ORDER — SODIUM CHLORIDE, SODIUM LACTATE, POTASSIUM CHLORIDE, CALCIUM CHLORIDE 600; 310; 30; 20 MG/100ML; MG/100ML; MG/100ML; MG/100ML
100 INJECTION, SOLUTION INTRAVENOUS CONTINUOUS
Status: DISCONTINUED | OUTPATIENT
Start: 2024-10-09 | End: 2024-10-10

## 2024-10-09 RX ORDER — ROCURONIUM BROMIDE 10 MG/ML
INJECTION, SOLUTION INTRAVENOUS AS NEEDED
Status: DISCONTINUED | OUTPATIENT
Start: 2024-10-09 | End: 2024-10-09

## 2024-10-09 RX ORDER — ACETAMINOPHEN 325 MG/1
975 TABLET ORAL ONCE
Status: COMPLETED | OUTPATIENT
Start: 2024-10-09 | End: 2024-10-09

## 2024-10-09 RX ORDER — DEXTROSE 50 % IN WATER (D50W) INTRAVENOUS SYRINGE
25
Status: DISCONTINUED | OUTPATIENT
Start: 2024-10-09 | End: 2024-10-12 | Stop reason: HOSPADM

## 2024-10-09 RX ORDER — PROPOFOL 10 MG/ML
INJECTION, EMULSION INTRAVENOUS AS NEEDED
Status: DISCONTINUED | OUTPATIENT
Start: 2024-10-09 | End: 2024-10-09

## 2024-10-09 RX ORDER — HYDROMORPHONE HYDROCHLORIDE 1 MG/ML
0.2 INJECTION, SOLUTION INTRAMUSCULAR; INTRAVENOUS; SUBCUTANEOUS EVERY 5 MIN PRN
Status: DISCONTINUED | OUTPATIENT
Start: 2024-10-09 | End: 2024-10-09 | Stop reason: HOSPADM

## 2024-10-09 RX ORDER — ATORVASTATIN CALCIUM 20 MG/1
20 TABLET, FILM COATED ORAL DAILY
Status: DISCONTINUED | OUTPATIENT
Start: 2024-10-09 | End: 2024-10-12 | Stop reason: HOSPADM

## 2024-10-09 RX ORDER — GABAPENTIN 300 MG/1
300 CAPSULE ORAL 3 TIMES DAILY
Status: DISCONTINUED | OUTPATIENT
Start: 2024-10-09 | End: 2024-10-12 | Stop reason: HOSPADM

## 2024-10-09 RX ORDER — BROMOCRIPTINE MESYLATE 2.5 MG/1
5 TABLET ORAL DAILY
Status: DISCONTINUED | OUTPATIENT
Start: 2024-10-10 | End: 2024-10-12 | Stop reason: HOSPADM

## 2024-10-09 RX ORDER — NORETHINDRONE AND ETHINYL ESTRADIOL 0.5-0.035
KIT ORAL AS NEEDED
Status: DISCONTINUED | OUTPATIENT
Start: 2024-10-09 | End: 2024-10-09

## 2024-10-09 RX ORDER — POLYETHYLENE GLYCOL 3350 17 G/17G
17 POWDER, FOR SOLUTION ORAL 3 TIMES DAILY
Status: DISCONTINUED | OUTPATIENT
Start: 2024-10-09 | End: 2024-10-12 | Stop reason: HOSPADM

## 2024-10-09 RX ORDER — LIDOCAINE HYDROCHLORIDE 10 MG/ML
0.1 INJECTION, SOLUTION INFILTRATION; PERINEURAL ONCE
Status: DISCONTINUED | OUTPATIENT
Start: 2024-10-09 | End: 2024-10-09 | Stop reason: HOSPADM

## 2024-10-09 RX ORDER — ALBUMIN HUMAN 50 G/1000ML
SOLUTION INTRAVENOUS AS NEEDED
Status: DISCONTINUED | OUTPATIENT
Start: 2024-10-09 | End: 2024-10-09

## 2024-10-09 RX ORDER — TAMSULOSIN HYDROCHLORIDE 0.4 MG/1
0.4 CAPSULE ORAL DAILY
Status: DISCONTINUED | OUTPATIENT
Start: 2024-10-09 | End: 2024-10-12 | Stop reason: HOSPADM

## 2024-10-09 RX ORDER — BROMOCRIPTINE MESYLATE 5 MG/1
5 CAPSULE ORAL DAILY
Status: DISCONTINUED | OUTPATIENT
Start: 2024-10-09 | End: 2024-10-09

## 2024-10-09 RX ORDER — FENTANYL CITRATE 50 UG/ML
INJECTION, SOLUTION INTRAMUSCULAR; INTRAVENOUS AS NEEDED
Status: DISCONTINUED | OUTPATIENT
Start: 2024-10-09 | End: 2024-10-09

## 2024-10-09 RX ORDER — OXYCODONE HYDROCHLORIDE 5 MG/1
5 TABLET ORAL EVERY 4 HOURS PRN
Status: DISCONTINUED | OUTPATIENT
Start: 2024-10-09 | End: 2024-10-12 | Stop reason: HOSPADM

## 2024-10-09 RX ORDER — ONDANSETRON 4 MG/1
4 TABLET, FILM COATED ORAL EVERY 8 HOURS PRN
Status: DISCONTINUED | OUTPATIENT
Start: 2024-10-09 | End: 2024-10-12 | Stop reason: HOSPADM

## 2024-10-09 RX ORDER — OXYCODONE HYDROCHLORIDE 5 MG/1
2.5 TABLET ORAL EVERY 4 HOURS PRN
Status: DISCONTINUED | OUTPATIENT
Start: 2024-10-09 | End: 2024-10-12 | Stop reason: HOSPADM

## 2024-10-09 RX ORDER — GLYCOPYRROLATE 0.2 MG/ML
INJECTION INTRAMUSCULAR; INTRAVENOUS AS NEEDED
Status: DISCONTINUED | OUTPATIENT
Start: 2024-10-09 | End: 2024-10-09

## 2024-10-09 RX ORDER — RANOLAZINE 500 MG/1
500 TABLET, EXTENDED RELEASE ORAL EVERY 12 HOURS
Status: DISCONTINUED | OUTPATIENT
Start: 2024-10-09 | End: 2024-10-12 | Stop reason: HOSPADM

## 2024-10-09 RX ORDER — PANTOPRAZOLE SODIUM 40 MG/1
40 TABLET, DELAYED RELEASE ORAL
Status: DISCONTINUED | OUTPATIENT
Start: 2024-10-10 | End: 2024-10-12 | Stop reason: HOSPADM

## 2024-10-09 RX ORDER — HYDROMORPHONE HYDROCHLORIDE 1 MG/ML
0.5 INJECTION, SOLUTION INTRAMUSCULAR; INTRAVENOUS; SUBCUTANEOUS EVERY 5 MIN PRN
Status: DISCONTINUED | OUTPATIENT
Start: 2024-10-09 | End: 2024-10-09 | Stop reason: HOSPADM

## 2024-10-09 RX ORDER — SODIUM CHLORIDE, SODIUM LACTATE, POTASSIUM CHLORIDE, CALCIUM CHLORIDE 600; 310; 30; 20 MG/100ML; MG/100ML; MG/100ML; MG/100ML
100 INJECTION, SOLUTION INTRAVENOUS CONTINUOUS
Status: DISCONTINUED | OUTPATIENT
Start: 2024-10-09 | End: 2024-10-09 | Stop reason: HOSPADM

## 2024-10-09 RX ORDER — PHENYLEPHRINE 10 MG/250 ML(40 MCG/ML)IN 0.9 % SOD.CHLORIDE INTRAVENOUS
CONTINUOUS PRN
Status: DISCONTINUED | OUTPATIENT
Start: 2024-10-09 | End: 2024-10-09

## 2024-10-09 RX ORDER — ONDANSETRON HYDROCHLORIDE 2 MG/ML
4 INJECTION, SOLUTION INTRAVENOUS EVERY 8 HOURS PRN
Status: DISCONTINUED | OUTPATIENT
Start: 2024-10-09 | End: 2024-10-12 | Stop reason: HOSPADM

## 2024-10-09 RX ORDER — ACETAMINOPHEN 325 MG/1
975 TABLET ORAL EVERY 8 HOURS
Status: DISCONTINUED | OUTPATIENT
Start: 2024-10-09 | End: 2024-10-12 | Stop reason: HOSPADM

## 2024-10-09 RX ORDER — SODIUM CHLORIDE 0.9 G/100ML
IRRIGANT IRRIGATION AS NEEDED
Status: DISCONTINUED | OUTPATIENT
Start: 2024-10-09 | End: 2024-10-09 | Stop reason: HOSPADM

## 2024-10-09 RX ORDER — INSULIN LISPRO 100 [IU]/ML
0-5 INJECTION, SOLUTION INTRAVENOUS; SUBCUTANEOUS
Status: DISCONTINUED | OUTPATIENT
Start: 2024-10-10 | End: 2024-10-11

## 2024-10-09 RX ORDER — CYCLOBENZAPRINE HCL 10 MG
10 TABLET ORAL 3 TIMES DAILY
Status: DISCONTINUED | OUTPATIENT
Start: 2024-10-09 | End: 2024-10-12 | Stop reason: HOSPADM

## 2024-10-09 RX ORDER — LIDOCAINE HYDROCHLORIDE AND EPINEPHRINE 5; 5 MG/ML; UG/ML
INJECTION, SOLUTION INFILTRATION; PERINEURAL AS NEEDED
Status: DISCONTINUED | OUTPATIENT
Start: 2024-10-09 | End: 2024-10-09 | Stop reason: HOSPADM

## 2024-10-09 RX ORDER — SODIUM CHLORIDE, SODIUM LACTATE, POTASSIUM CHLORIDE, CALCIUM CHLORIDE 600; 310; 30; 20 MG/100ML; MG/100ML; MG/100ML; MG/100ML
20 INJECTION, SOLUTION INTRAVENOUS CONTINUOUS
Status: DISCONTINUED | OUTPATIENT
Start: 2024-10-09 | End: 2024-10-10

## 2024-10-09 RX ORDER — CEFAZOLIN 1 G/1
INJECTION, POWDER, FOR SOLUTION INTRAVENOUS AS NEEDED
Status: DISCONTINUED | OUTPATIENT
Start: 2024-10-09 | End: 2024-10-09

## 2024-10-09 RX ORDER — HYDROMORPHONE HYDROCHLORIDE 1 MG/ML
INJECTION, SOLUTION INTRAMUSCULAR; INTRAVENOUS; SUBCUTANEOUS AS NEEDED
Status: DISCONTINUED | OUTPATIENT
Start: 2024-10-09 | End: 2024-10-09

## 2024-10-09 RX ORDER — LIDOCAINE 560 MG/1
1 PATCH PERCUTANEOUS; TOPICAL; TRANSDERMAL DAILY
Status: DISCONTINUED | OUTPATIENT
Start: 2024-10-09 | End: 2024-10-12 | Stop reason: HOSPADM

## 2024-10-09 RX ORDER — OXYCODONE HYDROCHLORIDE 10 MG/1
10 TABLET ORAL EVERY 4 HOURS PRN
Status: DISCONTINUED | OUTPATIENT
Start: 2024-10-09 | End: 2024-10-12 | Stop reason: HOSPADM

## 2024-10-09 RX ORDER — POLYMYXIN B 500000 [USP'U]/1
INJECTION, POWDER, LYOPHILIZED, FOR SOLUTION INTRAMUSCULAR; INTRATHECAL; INTRAVENOUS; OPHTHALMIC AS NEEDED
Status: DISCONTINUED | OUTPATIENT
Start: 2024-10-09 | End: 2024-10-09 | Stop reason: HOSPADM

## 2024-10-09 RX ORDER — DEXTROSE 50 % IN WATER (D50W) INTRAVENOUS SYRINGE
12.5
Status: DISCONTINUED | OUTPATIENT
Start: 2024-10-09 | End: 2024-10-12 | Stop reason: HOSPADM

## 2024-10-09 RX ORDER — GABAPENTIN 300 MG/1
300 CAPSULE ORAL ONCE
Status: COMPLETED | OUTPATIENT
Start: 2024-10-09 | End: 2024-10-09

## 2024-10-09 RX ORDER — FLUOXETINE HYDROCHLORIDE 20 MG/1
60 CAPSULE ORAL DAILY
Status: DISCONTINUED | OUTPATIENT
Start: 2024-10-09 | End: 2024-10-12 | Stop reason: HOSPADM

## 2024-10-09 RX ORDER — HYDROMORPHONE HYDROCHLORIDE 1 MG/ML
0.5 INJECTION, SOLUTION INTRAMUSCULAR; INTRAVENOUS; SUBCUTANEOUS EVERY 4 HOURS PRN
Status: DISCONTINUED | OUTPATIENT
Start: 2024-10-09 | End: 2024-10-12 | Stop reason: HOSPADM

## 2024-10-09 RX ORDER — CEFAZOLIN SODIUM 2 G/100ML
2 INJECTION, SOLUTION INTRAVENOUS ONCE
Status: DISCONTINUED | OUTPATIENT
Start: 2024-10-09 | End: 2024-10-09 | Stop reason: HOSPADM

## 2024-10-09 RX ORDER — ONDANSETRON HYDROCHLORIDE 2 MG/ML
INJECTION, SOLUTION INTRAVENOUS AS NEEDED
Status: DISCONTINUED | OUTPATIENT
Start: 2024-10-09 | End: 2024-10-09

## 2024-10-09 RX ORDER — LIDOCAINE HYDROCHLORIDE 20 MG/ML
INJECTION, SOLUTION INFILTRATION; PERINEURAL AS NEEDED
Status: DISCONTINUED | OUTPATIENT
Start: 2024-10-09 | End: 2024-10-09

## 2024-10-09 RX ORDER — VANCOMYCIN HYDROCHLORIDE 1 G/20ML
INJECTION, POWDER, LYOPHILIZED, FOR SOLUTION INTRAVENOUS AS NEEDED
Status: DISCONTINUED | OUTPATIENT
Start: 2024-10-09 | End: 2024-10-09 | Stop reason: HOSPADM

## 2024-10-09 RX ORDER — HYDROMORPHONE HYDROCHLORIDE 1 MG/ML
0.1 INJECTION, SOLUTION INTRAMUSCULAR; INTRAVENOUS; SUBCUTANEOUS EVERY 5 MIN PRN
Status: DISCONTINUED | OUTPATIENT
Start: 2024-10-09 | End: 2024-10-09 | Stop reason: HOSPADM

## 2024-10-09 RX ORDER — NALOXONE HYDROCHLORIDE 0.4 MG/ML
0.2 INJECTION, SOLUTION INTRAMUSCULAR; INTRAVENOUS; SUBCUTANEOUS EVERY 5 MIN PRN
Status: DISCONTINUED | OUTPATIENT
Start: 2024-10-09 | End: 2024-10-12 | Stop reason: HOSPADM

## 2024-10-09 ASSESSMENT — PAIN SCALES - GENERAL
PAINLEVEL_OUTOF10: 4
PAINLEVEL_OUTOF10: 5 - MODERATE PAIN
PAINLEVEL_OUTOF10: 5 - MODERATE PAIN
PAINLEVEL_OUTOF10: 0 - NO PAIN
PAINLEVEL_OUTOF10: 0 - NO PAIN
PAINLEVEL_OUTOF10: 5 - MODERATE PAIN
PAINLEVEL_OUTOF10: 5 - MODERATE PAIN
PAINLEVEL_OUTOF10: 6
PAINLEVEL_OUTOF10: 5 - MODERATE PAIN
PAINLEVEL_OUTOF10: 0 - NO PAIN
PAINLEVEL_OUTOF10: 0 - NO PAIN
PAINLEVEL_OUTOF10: 5 - MODERATE PAIN

## 2024-10-09 ASSESSMENT — PAIN DESCRIPTION - LOCATION: LOCATION: BACK

## 2024-10-09 NOTE — ANESTHESIA PROCEDURE NOTES
Airway  Date/Time: 10/9/2024 8:48 AM  Urgency: elective    Airway not difficult    Staffing  Performed: ALFONSO   Authorized by: Noelle Smallwood MD    Performed by: MAE Peres Capp  Patient location during procedure: OR    Indications and Patient Condition  Indications for airway management: airway protection and anesthesia  Spontaneous Ventilation: absent  Sedation level: deep  Preoxygenated: yes  Mask difficulty assessment: 1 - vent by mask    Final Airway Details  Final airway type: endotracheal airway      Successful airway: ETT  Cuffed: yes   Successful intubation technique: video laryngoscopy  Facilitating devices/methods: intubating stylet  Endotracheal tube insertion site: oral  Blade size: #4  ETT size (mm): 7.5  Cormack-Lehane Classification: grade I - full view of glottis  Placement verified by: chest auscultation and capnometry   Measured from: gums  ETT to gums (cm): 22  Number of attempts at approach: 1  Number of other approaches attempted: 0    Additional Comments  Atraumatic Intubation. One attempt. Lips and teeth in preanesthetic condition.

## 2024-10-09 NOTE — HOSPITAL COURSE
Misael Lopez is a 74 year old male with a past medical history of CAD with MI s/p stent on ASA, bradycardia with heart block, cardiac murmur, HTN, JOSE, HLD, GERD, BPH, depression and cervical myelopathy 4/15/2024 s/p C3-C7 ACDF (Dr. Mauricio) who presented with thoracic myelopathy and BLE pain and neurogenic claudication. Noted R T8-T9 thoracic calcified disc herniation, cord compression and associated cord signal changes, L1-L2 and L3-L5 lumbar stenosis. Admitted to neurosurgery for further evaluation and treatment.     10/9 S/p T8-T9 lami w/ R T9 transpedicular decompression and discectomy, T8-T10 posterolateral instrumented fusion, L3-L5 lami  10/11 drain off suction d/t higher output, complaints of feeling lightheaded and mild hypotension, improved with fluids.     PT/OT eval and recommended no further therapy at time of discharge.    On the day of discharge, the patient was seen and evaluated by the neurosurgery team and deemed suitable for discharge home. There were no significant events overnight. Vitals were reviewed and within normal limits. Labs were stable at discharge. On day of discharge the patient was tolerating a diet, pain was controlled on PO pain medication, was ambulating well and voiding spontaneously. The patient was given detailed discharge instructions and were scheduled to follow up as an outpatient.

## 2024-10-09 NOTE — ANESTHESIA PREPROCEDURE EVALUATION
Patient: Misael Lopez    Procedure Information       Date/Time: 10/09/24 0735    Procedures:       T9 Transpedicular Decompression, T8-10 Fusion L1-2, L3-5 Laminectomy. (Bilateral: Back) - CPT 02110, 63048 x2, 55450,98477, 18494, 16146 Nuvasive ( John Paul), Allograft ( SIGNIFY)      Fusion Spine Posterior Thoracic with Navigation ( 21610 X1, 22614 X3, 33143, 63048 X2, 00781, 77201, 20323, 57555) PLEASE ADD ALL THE CPT CODES FOR THIS SURGERY. THANK YOU, Melly (Bilateral)    Location: [facility] OR 24 / [facility] OR    Surgeons: Radha Ching MD          HPI: Patient is a 74-year-old male scheduled for T9 transpedicular decompression, T7-11 fusion, L1-2, L3-5 laminectomy on October 9, 2024 for treatment of lumbar spinal stenosis with neurogenic claudication and intervertebral thoracic disc disorder with myelopathy.  The patient is referred by Dr. Radha Ching for preoperative evaluation of anxiety, depression, osteoarthritis, lumbar spinal stenosis with neurogenic claudication, intervertebral thoracic disc disorder with myelopathy, CAD status post MI with PCI and stent placement, GERD, hypertension, hyperlipidemia, nephrolithiasis, obesity status post gastric bypass, sleep apnea compliant with CPAP.     EKG 08/14/2024 normal sinus rhythm     Stress test 02/28/22  IMPRESSION:  1. Normal stress myocardial perfusion imaging in response to  pharmacologic stress.  2. Well-maintained left ventricular function.  50%     Echo 02/12/22  CONCLUSIONS:   1. The left ventricular systolic function is normal with a 55-60% estimated ejection fraction.   2. RVSP within normal limits.       Relevant Problems   Anesthesia   (-) Difficult intubation (Per chart review h/o difficult intubation, unknown details, but was successfully intubated with Glidescope size 4 at  on 4/2024 for c-spine surgery )      Cardiac   (+) Angina pectoris   (+) Coronary artery disease   (+) Heart block   (+) Heart murmur   (+) Hyperlipidemia   (+)  "Hypertension, benign   (+) Myocardial infarct (Multi)   (+) PVC's (premature ventricular contractions)      Pulmonary   (+) Obstructive sleep apnea syndrome      Neuro  S/p C-spine surgery with residual right hand paresthesia/\"stiffness\" per patient   (+) Depression   (+) PTSD (post-traumatic stress disorder)   (+) Peripheral neuropathy   (+) Severe episode of recurrent major depressive disorder, without psychotic features (Multi)      GI   (+) Gastroesophageal reflux disease without esophagitis      /Renal   (+) Benign prostatic hyperplasia   (+) Calculus of kidney      Endocrine   (+) Obesity, unspecified      Musculoskeletal   (+) Cervical spondylosis with myelopathy   (+) Degeneration of lumbar or lumbosacral intervertebral disc   (+) Lumbar stenosis with neurogenic claudication   (+) Lumbosacral spondylosis   (+) Osteoarthritis of carpometacarpal (CMC) joint of left thumb   (+) Primary osteoarthritis of left knee     Cervical spine fusion 4/2024 under GA ETT, anesthesia chart reviewed. Airway Glidescope size 4 blade grade I view. Art line placed in left radial.     Clinical information reviewed:     Meds             NPO Detail:  No data recorded    Vitals:    10/09/24 0658   BP: 136/70   Pulse: 64   Resp: 18   Temp: 36.7 °C (98.1 °F)   SpO2: 97%       Past Surgical History:   Procedure Laterality Date    BREAST LUMPECTOMY      BUNIONECTOMY      CARDIAC CATHETERIZATION  11/02/2020    CARPAL TUNNEL RELEASE      CERVICAL SPINE SURGERY  04/15/2024    ACDFP    COLONOSCOPY      GASTRIC BYPASS      KNEE ARTHROSCOPY W/ DEBRIDEMENT      LITHOTRIPSY      Renal Lithotripsy    LUMBAR LAMINECTOMY      NASAL SEPTUM SURGERY      OTHER SURGICAL HISTORY  09/16/2015    Hemilaminectomy    SHOULDER SURGERY      UPPER GASTROINTESTINAL ENDOSCOPY      VASECTOMY       Past Medical History:   Diagnosis Date    Anxiety     Arthritis     ASHD (arteriosclerotic heart disease)     Coronary artery disease     Depression     GERD " (gastroesophageal reflux disease)     HL (hearing loss)     Hyperlipidemia     Hypertension     Lumbar radiculopathy     Myocardial infarction (Multi)     Nephrolithiasis     NSVT (nonsustained ventricular tachycardia) (Multi)     Obesity     S/P gastric bypass    Sleep apnea     Spinal stenosis     Thoracic myelopathy        Current Facility-Administered Medications:     ceFAZolin (Ancef) 2 g in dextrose (iso)  mL, 2 g, intravenous, Once, Radha Ching MD    lactated Ringer's infusion, 20 mL/hr, intravenous, Continuous, Radha Ching MD  Prior to Admission medications    Medication Sig Start Date End Date Taking? Authorizing Provider   acetaminophen (Tylenol) 325 mg tablet Take 3 tablets (975 mg) by mouth every 8 hours if needed for mild pain (1 - 3) (post op pain). 4/16/24  Yes BILL Pollock-CNP   aspirin 81 mg EC tablet Take 1 tablet (81 mg) by mouth once daily.   Yes Historical Provider, MD   atorvastatin (Lipitor) 20 mg tablet Take 1 tablet (20 mg) by mouth once daily. 5/13/24  Yes Davonte Valdez MD   bromocriptine (Parlodel) 5 mg capsule Take 1 capsule (5 mg) by mouth once daily. 5/28/09  Yes Historical Provider, MD   calcium acetate (Phoslo) 667 mg tablet Take 1 tablet (667 mg) by mouth once daily.   Yes Historical Provider, MD   calcium polycarbophiL (FiberCon) 625 mg tablet Take 1 tablet (625 mg) by mouth once daily.   Yes Historical Provider, MD   chlorhexidine (Peridex) 0.12 % solution Swish and spit 15 mL night before surgery and morning of surgery 9/26/24  Yes Samantha A Meeson, APRN-CNP   cyanocobalamin (Vitamin B-12) 100 mcg tablet Take 1 tablet (100 mcg) by mouth once daily.   Yes Historical Provider, MD   cyclobenzaprine (Flexeril) 10 mg tablet Take 1 tablet (10 mg) by mouth 3 times a day as needed for muscle spasms (post op pain). 9/6/24  Yes Radha Ching MD   mirtazapine (Remeron) 15 mg tablet Take 1 tablet (15 mg) by mouth once daily at bedtime. 3/19/18  Yes Historical Provider, MD    multivitamin with minerals (multivit-min-iron fum-folic ac) tablet Take 1 tablet by mouth once daily. 9/16/15  Yes Historical Provider, MD   pantoprazole (ProtoNix) 40 mg EC tablet Take 1 tablet (40 mg) by mouth once daily in the morning. Take before meals.   Yes Historical Provider, MD   PROzac 20 mg capsule Take 3 capsules (60 mg) by mouth once daily.   Yes Historical Provider, MD   ranolazine (Ranexa) 500 mg 12 hr tablet Take 1 tablet (500 mg) by mouth every 12 hours. 5/13/24 5/13/25 Yes Davonte Valdez MD   tamsulosin (Flomax) 0.4 mg 24 hr capsule Take 1 capsule (0.4 mg) by mouth 2 times a day. 5/20/14  Yes Historical Provider, MD   gabapentin (Neurontin) 100 mg capsule Take 1 capsule (100 mg) by mouth 3 times a day. 9/6/24 10/6/24  Radha Ching MD     Allergies   Allergen Reactions    Nitroglycerin Other     Arrhythmia / Heart Block     Social History     Tobacco Use    Smoking status: Never     Passive exposure: Never    Smokeless tobacco: Never   Substance Use Topics    Alcohol use: Yes     Comment: socially         Chemistry    Lab Results   Component Value Date/Time     09/26/2024 0953    K 3.9 09/26/2024 0953     09/26/2024 0953    CO2 26 09/26/2024 0953    BUN 20 09/26/2024 0953    CREATININE 0.95 09/26/2024 0953    Lab Results   Component Value Date/Time    CALCIUM 9.2 09/26/2024 0953    ALKPHOS 76 02/22/2021 0841    AST 22 02/22/2021 0841    ALT 27 02/22/2021 0841    BILITOT 1.2 02/22/2021 0841          Lab Results   Component Value Date/Time    WBC 6.2 09/26/2024 0953    HGB 13.1 (L) 09/26/2024 0953    HCT 39.4 (L) 09/26/2024 0953     09/26/2024 0953     Lab Results   Component Value Date/Time    PROTIME 12.5 09/26/2024 0953    INR 1.1 09/26/2024 0953     Encounter Date: 08/14/24   ECG 12 lead (Clinic Performed)    Narrative    NSR at 62/min., WNL     No results found for this or any previous visit from the past 1095 days.       Physical Exam    Airway  Mallampati: II  TM  distance: <3 FB  Neck ROM: full     Cardiovascular - normal exam     Dental        Pulmonary - normal exam     Abdominal   (+) obese  Abdomen: soft  Bowel sounds: normal     Other findings: Beard               Anesthesia Plan    History of general anesthesia?: yes  History of complications of general anesthesia?: no    ASA 3     general     The patient is not a current smoker.  Patient was not previously instructed to abstain from smoking on day of procedure.  Patient did not smoke on day of procedure.    intravenous induction   Postoperative administration of opioids is intended.  Trial extubation is planned.        GA ETT Glidescope  Additional large bore PIV and arterial line after induction  Possible prolonged postop mechanical ventilation/ICU

## 2024-10-09 NOTE — OP NOTE
T9 Transpedicular Decompression, T8-10 Fusion, L3-5 Laminectomy. (B), Fusion Spine Posterior Thoracic with Navigation ( 21610 X1, 22614 X3, 05438, 63048 X2, 95509, 12963, 07317, 68644) (B) Operative Note     Date: 10/9/2024  OR Location: [facility] OR    Name: Misael Lopez, : 1950, Age: 74 y.o., MRN: [id number], Sex: male    Diagnosis  Pre-op Diagnosis      * Lumbar stenosis with neurogenic claudication [M48.062]     * Intervertebral thoracic disc disorder with myelopathy, thoracic region [M51.04] Post-op Diagnosis     * Lumbar stenosis with neurogenic claudication [M48.062]     * Intervertebral thoracic disc disorder with myelopathy, thoracic region [M51.04]     Procedures  T9 Transpedicular Decompression, T8-10 Fusion, L3-5 Laminectomy.  96898 - [place] TRANSPEDICULAR DCMPRN SPINAL CORD 1 SEG THORACIC    Fusion Spine Posterior Thoracic with Navigation ( 21610 X1, 22614 X3, 93781, 63048 X2, 53659, 91620, 38584, 32452)  83913 - [place] [facility]      Surgeons      * Radha Ching - Primary    Resident/Fellow/Other Assistant:  Surgeons and Role:     * Ruby Lubin MD - Resident - Assisting     * iPetro Law MD - Resident - Assisting    Procedure Summary  Anesthesia: General  ASA: III  Anesthesia Staff: Anesthesiologist: Clark Lara MD; Noelle Smallwood MD  C-AA: MAE Peres Capp; MAE Baumann  ALFONSO: Roberto Carlos Rodas  Estimated Blood Loss: 710 mL  Intra-op Medications: Administrations occurring from 10/09/24 1727 to 10/10/24 1727:  * No intraprocedure medications in log *           Anesthesia Record               Intraprocedure I/O Totals          Intake    Remifentanil Drip 0.00 mL    The total shown is the total volume documented since Anesthesia Start was filed.    Phenylephrine Drip 0.00 mL    The total shown is the total volume documented since Anesthesia Start was filed.    lactated Ringer's infusion 800.00 mL    Total Intake 800 mL       Output    Urine 1005 mL     Est. Blood Loss 710 mL    Total Output 1715 mL       Net    Net Volume -915 mL          Specimen: No specimens collected     Staff:   Circulator: Kizzy Benoitub Person: Tan Chaney Scrub: Annita Chaney Circulator: Annita         Drains and/or Catheters:   Closed/Suction Drain Right;Superior Back Accordion 10 Fr. (Active)       Closed/Suction Drain Left Back Accordion 10 Fr. (Active)       Urethral Catheter Non-latex 16 Fr. (Active)       Tourniquet Times:         Implants:  Implants       Type Name Action Serial No.      Screw SCREW, RELINE-O, 6.5X45MM 2S POLYAXIAL - [id number] Implanted      Neuro Interventional Implant SCREW, RELINE LOCK, 5.5MM OPEN TULIP - [id number] Implanted      Screw JOSE, RELINE-O, 5.5 X 110MM, LORDOTIC - [id number] Implanted      Graft GRAFT MATRIX, DURAL, DURAGEN PLUS 1X1 - [id number] Implanted               Findings: good placement of hardware on fluoro, calcified disc herniation    Indications: Misael Lopez is an 74 y.o. male who is having surgery for Lumbar stenosis with neurogenic claudication [M48.062]  Intervertebral thoracic disc disorder with myelopathy, thoracic region [M51.04].     The patient was seen in the preoperative area. The risks, benefits, complications, treatment options, non-operative alternatives, expected recovery and outcomes were discussed with the patient. The possibilities of reaction to medication, pulmonary aspiration, injury to surrounding structures, bleeding, recurrent infection, the need for additional procedures, failure to diagnose a condition, and creating a complication requiring transfusion or operation were discussed with the patient. The patient concurred with the proposed plan, giving informed consent.  The site of surgery was properly noted/marked if necessary per policy. The patient has been actively warmed in preoperative area. Preoperative antibiotics have been ordered and given within 1 hours of incision. Venous thrombosis prophylaxis have  been ordered including bilateral sequential compression devices    Procedure Details: The patient was brought to the operating room theater. A verbal huddle was performed confirming the patient by name, date of birth, medical record number, site of surgery. After all team members were in agreement, they underwent anesthesia induction without complication. Two large bore IVs were placed as well as endotracheal tube. Perioperative antibiotic administration was confirmed. The patient was flipped prone onto a vernell table and their back was prepped and draped in a sterile fashion. Using lateral fluoroscopy we confirmed our levels of interest with a spinal needle and an incision was marked out in the midline covering T8-T10 and L3-5. Neuromonitoring was used throughout the duration of the case both before and after flipping with good MEPs and SSEPs, both of which were trackable and remained at baseline throughout the case.     The skin was then infiltrated with local anesthetic and we then began the procedure by opening the skin with a 10 blade and using combination of Bovie electrocautery and blunt dissection we exposed the spinous process, pars, lamina, and joints. We then used lateral fluoroscopy again to confirm our levels of interest T8-T10 after total exposure and a self-retaining retractor was placed into the incision.      A spinous process clamp was attached for navigation and the O arm brought into the room. An intraoperative CT scan was performed of the thoracic spine. Stereotactic navigation was then used to confirm our levels again. We then cannulated the pedicles bilaterally with a navigated drill and tap from T8-T10. We placed 6.5mm screws at each segment. We skipped T9 on the right side.      Next we turned our attention to the decompression. To begin a leksell rongeur and a high speed drill was used to remove the spinous process and lamina exposing the underlying ligamentum flavum and thecal sac from  T8-9. We then completed total facetectomies across T8-9 on the right by removing the inferior and superior articulating processes of the joint. We then performed a transpedicular approach to get circumferential access to the thecal sac by removing the medial and superior aspect of the T9 pedicle. After removal of the bone we used kerrison rongeurs, curettes, a high speed drill, the sonopet with a bone cutting attachment, and blunt dissection to remove the calcified disc herniation. Ultrasound was used at the beginning after the laminectomy and throughout the case to confirm adequate decompression.     Next we completed our segmental instrumentation with rods and locking caps from T8-T10. Posterolateral arthrodesis and fusion was performed by decorticating the remaining joints and bone graft was placed along the edges for fusion.    Our attention was then turned to the lumbar laminectomy. Incision was marked, and 1% lidocaine with epinephrine was used to infiltrate skin under marked incision.    A combination of sharp and blunt electrocautery was used to expose spinous process, and lamina out to facet joint capsule with care taken to not violate this capsule with the bovey. Once adequate exposure was obtained, clamps were used to confirm the L3-5 levels using X-ray. The laminectomy was carried out by first biting of the spinous process and posterior tension band with a Luis Antonio rongeur and Leksell rongeur down to the level of the pars.    A high-speed matchstick drill was used to further thin bone overlying thecal sac until ligamentum flavum was exposed for the karly-caudal extent of the laminectomy. A combination of curettes and kerrasin rongeurs were used to complete the laminectomies and removal of ligamentum flavum. AnchorFree dental tool was used to confirm good decompression in karly-caudal dimensions and of lateral recesses. A second X-ray was taken with instruments marking the rostral and caudal aspects of the  decompression to confirm that we were across the levels of the patient's known stenosis. During the decompression at the top level on the right in the lateral recess, significant scarring was encountered and a partial thickness durotomy was made. This was covered with duragen and duraseal prior to closure as primary repair was not possible given the location.    After completion of all portions above, both incisions were irrigated with antibiotic impregnated normal saline and hemostasis was achieved. A 10-round drain was placed in the subfascial space for both incisions and tunneled out laterally. For both incisions, 0-vicryl suture was used to close the muscle over the laminectomy defect, followed by another layer of 0-vicryl suture was used to close the entirety of the linear fascial defect. 2-0 vicryls were used to approximate the skin edges, 3-0 stratafix for the skin, and mesh-based skin glue system (EXOPHIN FUSION) was used to close skin. Both drains were secured with a 2-0 silk suture.     The patient was carefully flipped into supine position on patient cart, and turned over to anesthesia to extubate. All counts were correct at end of case without any obvious complication.     Complications:  None; patient tolerated the procedure well.    Disposition: PACU - hemodynamically stable.  Condition: stable         Additional Details: none    Attending Attestation:     Radha Ching  Phone Number: [phone]

## 2024-10-09 NOTE — ANESTHESIA POSTPROCEDURE EVALUATION
Patient: Misael Lopez    Procedure Summary       Date: 10/09/24 Room / Location: [facility] OR 24 / [facility]    Anesthesia Start: 0828 Anesthesia Stop: 1619    Procedures:       T9 Transpedicular Decompression, T8-10 Fusion, L3-5 Laminectomy. (Bilateral: Back)      Fusion Spine Posterior Thoracic with Navigation ( 21610 X1, 22614 X3, 25797, 63048 X2, 91122, 56939, 63659, 33382) (Bilateral: Back) Diagnosis:       Lumbar stenosis with neurogenic claudication      Intervertebral thoracic disc disorder with myelopathy, thoracic region      (Lumbar stenosis with neurogenic claudication [M48.062])      (Intervertebral thoracic disc disorder with myelopathy, thoracic region [M51.04])    Surgeons: Radha Ching MD Responsible Provider: Clark Lara MD    Anesthesia Type: general ASA Status: 3            Anesthesia Type: general    Vitals Value Taken Time   /56 10/09/24 1615   Temp 36.5 10/09/24 1619   Pulse 93 10/09/24 1616   Resp 18 10/09/24 1616   SpO2 98 % 10/09/24 1616   Vitals shown include unfiled device data.    Anesthesia Post Evaluation    Patient location during evaluation: PACU  Patient participation: complete - patient participated  Level of consciousness: awake and alert  Pain management: adequate  Airway patency: patent  Cardiovascular status: acceptable and hemodynamically stable  Respiratory status: acceptable and face mask  Hydration status: acceptable  Postoperative Nausea and Vomiting: none        No notable events documented.

## 2024-10-09 NOTE — ANESTHESIA PROCEDURE NOTES
Arterial Line:    Date/Time: 10/9/2024 9:41 AM    Staffing  Performed: attending and ALFONSO   Authorized by: Noelle Smallwood MD    Performed by: MAE Peres Capp    An arterial line was placed. Procedure performed using ultrasound guidance.in the OR for the following indication(s): continuous blood pressure monitoring and blood sampling needed.    A 20 gauge (size), 12 cm (length), Angiocath (type) catheter was placed into the Right axillary artery, secured by Tegaderm,   Seldinger technique used.  Events:  patient tolerated procedure well with no complications.      Additional notes:  Baseline poor radial pulses in preop but R>L. After induction left weak, right still palpable with adequate vitals. Sterile prep and gloves for first attempt to right radial with ultrasound. Small caliber and supeficial artery. Unable to thread microwire.  Switch to right axillary with U/S.  Sterile procedure (gloves, arterial line kit with field drape, gauze, probe cover, etc), CHG dressing placed. Good waveform and blood return when aspirated.        images stored in chart

## 2024-10-09 NOTE — ANESTHESIA PROCEDURE NOTES
Peripheral IV  Date/Time: 10/9/2024 8:55 AM  Inserted by: Noelle Smallwood MD    Placement  Needle size: 16 G  Laterality: left  Location: hand  Local anesthetic: none  Site prep: alcohol  Attempts: 1

## 2024-10-09 NOTE — ANESTHESIA PROCEDURE NOTES
Peripheral IV  Date/Time: 10/9/2024 9:55 AM  Inserted by: MAE Peres Capp    Placement  Needle size: 20 G  Laterality: right  Location: hand  Local anesthetic: none  Site prep: alcohol  Attempts: 3

## 2024-10-10 LAB
ANION GAP SERPL CALC-SCNC: 15 MMOL/L (ref 10–20)
BUN SERPL-MCNC: 18 MG/DL (ref 6–23)
CALCIUM SERPL-MCNC: 8.9 MG/DL (ref 8.6–10.6)
CHLORIDE SERPL-SCNC: 103 MMOL/L (ref 98–107)
CO2 SERPL-SCNC: 26 MMOL/L (ref 21–32)
CREAT SERPL-MCNC: 0.89 MG/DL (ref 0.5–1.3)
EGFRCR SERPLBLD CKD-EPI 2021: 90 ML/MIN/1.73M*2
ERYTHROCYTE [DISTWIDTH] IN BLOOD BY AUTOMATED COUNT: 12.5 % (ref 11.5–14.5)
GLUCOSE BLD MANUAL STRIP-MCNC: 104 MG/DL (ref 74–99)
GLUCOSE BLD MANUAL STRIP-MCNC: 116 MG/DL (ref 74–99)
GLUCOSE BLD MANUAL STRIP-MCNC: 135 MG/DL (ref 74–99)
GLUCOSE BLD MANUAL STRIP-MCNC: 141 MG/DL (ref 74–99)
GLUCOSE SERPL-MCNC: 135 MG/DL (ref 74–99)
HCT VFR BLD AUTO: 29.4 % (ref 41–52)
HGB BLD-MCNC: 10.3 G/DL (ref 13.5–17.5)
MCH RBC QN AUTO: 33.1 PG (ref 26–34)
MCHC RBC AUTO-ENTMCNC: 35 G/DL (ref 32–36)
MCV RBC AUTO: 95 FL (ref 80–100)
NRBC BLD-RTO: 0 /100 WBCS (ref 0–0)
PLATELET # BLD AUTO: 175 X10*3/UL (ref 150–450)
POTASSIUM SERPL-SCNC: 5.1 MMOL/L (ref 3.5–5.3)
RBC # BLD AUTO: 3.11 X10*6/UL (ref 4.5–5.9)
SODIUM SERPL-SCNC: 139 MMOL/L (ref 136–145)
WBC # BLD AUTO: 12.5 X10*3/UL (ref 4.4–11.3)

## 2024-10-10 PROCEDURE — 2500000005 HC RX 250 GENERAL PHARMACY W/O HCPCS: Performed by: STUDENT IN AN ORGANIZED HEALTH CARE EDUCATION/TRAINING PROGRAM

## 2024-10-10 PROCEDURE — 99024 POSTOP FOLLOW-UP VISIT: CPT | Performed by: STUDENT IN AN ORGANIZED HEALTH CARE EDUCATION/TRAINING PROGRAM

## 2024-10-10 PROCEDURE — 2500000001 HC RX 250 WO HCPCS SELF ADMINISTERED DRUGS (ALT 637 FOR MEDICARE OP): Performed by: STUDENT IN AN ORGANIZED HEALTH CARE EDUCATION/TRAINING PROGRAM

## 2024-10-10 PROCEDURE — 2500000002 HC RX 250 W HCPCS SELF ADMINISTERED DRUGS (ALT 637 FOR MEDICARE OP, ALT 636 FOR OP/ED): Performed by: STUDENT IN AN ORGANIZED HEALTH CARE EDUCATION/TRAINING PROGRAM

## 2024-10-10 PROCEDURE — 80048 BASIC METABOLIC PNL TOTAL CA: CPT | Performed by: STUDENT IN AN ORGANIZED HEALTH CARE EDUCATION/TRAINING PROGRAM

## 2024-10-10 PROCEDURE — 1200000002 HC GENERAL ROOM WITH TELEMETRY DAILY

## 2024-10-10 PROCEDURE — 2500000004 HC RX 250 GENERAL PHARMACY W/ HCPCS (ALT 636 FOR OP/ED): Performed by: STUDENT IN AN ORGANIZED HEALTH CARE EDUCATION/TRAINING PROGRAM

## 2024-10-10 PROCEDURE — 36415 COLL VENOUS BLD VENIPUNCTURE: CPT | Performed by: STUDENT IN AN ORGANIZED HEALTH CARE EDUCATION/TRAINING PROGRAM

## 2024-10-10 PROCEDURE — 97165 OT EVAL LOW COMPLEX 30 MIN: CPT | Mod: GO

## 2024-10-10 PROCEDURE — 97161 PT EVAL LOW COMPLEX 20 MIN: CPT | Mod: GP

## 2024-10-10 PROCEDURE — 82947 ASSAY GLUCOSE BLOOD QUANT: CPT

## 2024-10-10 PROCEDURE — 85027 COMPLETE CBC AUTOMATED: CPT | Performed by: STUDENT IN AN ORGANIZED HEALTH CARE EDUCATION/TRAINING PROGRAM

## 2024-10-10 PROCEDURE — 97530 THERAPEUTIC ACTIVITIES: CPT | Mod: GP

## 2024-10-10 RX ORDER — POLYETHYLENE GLYCOL 3350 17 G/17G
17 POWDER, FOR SOLUTION ORAL DAILY
Qty: 14 EACH | Refills: 0 | Status: CANCELLED | OUTPATIENT
Start: 2024-10-10

## 2024-10-10 RX ORDER — LIDOCAINE 560 MG/1
1 PATCH PERCUTANEOUS; TOPICAL; TRANSDERMAL DAILY
Qty: 14 PATCH | Refills: 0 | Status: CANCELLED | OUTPATIENT
Start: 2024-10-10

## 2024-10-10 ASSESSMENT — COGNITIVE AND FUNCTIONAL STATUS - GENERAL
CLIMB 3 TO 5 STEPS WITH RAILING: A LITTLE
MOBILITY SCORE: 21
STANDING UP FROM CHAIR USING ARMS: A LITTLE
DAILY ACTIVITIY SCORE: 23
MOVING TO AND FROM BED TO CHAIR: A LITTLE
MOBILITY SCORE: 20
WALKING IN HOSPITAL ROOM: A LITTLE
HELP NEEDED FOR BATHING: A LITTLE
WALKING IN HOSPITAL ROOM: A LITTLE
STANDING UP FROM CHAIR USING ARMS: A LITTLE
CLIMB 3 TO 5 STEPS WITH RAILING: A LITTLE

## 2024-10-10 ASSESSMENT — PAIN - FUNCTIONAL ASSESSMENT
PAIN_FUNCTIONAL_ASSESSMENT: 0-10

## 2024-10-10 ASSESSMENT — PAIN DESCRIPTION - ORIENTATION
ORIENTATION: POSTERIOR
ORIENTATION: POSTERIOR

## 2024-10-10 ASSESSMENT — PAIN SCALES - GENERAL
PAINLEVEL_OUTOF10: 5 - MODERATE PAIN
PAINLEVEL_OUTOF10: 6
PAINLEVEL_OUTOF10: 0 - NO PAIN
PAINLEVEL_OUTOF10: 4
PAINLEVEL_OUTOF10: 3
PAINLEVEL_OUTOF10: 2
PAINLEVEL_OUTOF10: 4
PAINLEVEL_OUTOF10: 3
PAINLEVEL_OUTOF10: 3
PAINLEVEL_OUTOF10: 1
PAINLEVEL_OUTOF10: 2
PAINLEVEL_OUTOF10: 3

## 2024-10-10 ASSESSMENT — PAIN DESCRIPTION - LOCATION
LOCATION: BACK

## 2024-10-10 ASSESSMENT — ACTIVITIES OF DAILY LIVING (ADL)
BATHING_ASSISTANCE: STAND BY
ADL_ASSISTANCE: INDEPENDENT
LACK_OF_TRANSPORTATION: NO

## 2024-10-10 NOTE — PROGRESS NOTES
"Misael Lopez is a 74 y.o. male on day 1 of admission presenting with Lumbar stenosis with neurogenic claudication.    Subjective   No acute events overnight, post op pain well controlled       Objective     Physical Exam  Awake  Ox3  BUE 5/5  BLE 5/5  Incision c/d/i    Last Recorded Vitals  Blood pressure 97/71, pulse 69, temperature 36 °C (96.8 °F), temperature source Temporal, resp. rate 11, height 1.702 m (5' 7\"), weight 97.9 kg (215 lb 13.3 oz), SpO2 97%.  Intake/Output last 3 Shifts:  I/O last 3 completed shifts:  In: 800 (8.2 mL/kg) [I.V.:800 (8.2 mL/kg)]  Out: 1910 (19.5 mL/kg) [Urine:1130 (0.3 mL/kg/hr); Drains:70; Blood:710]  Weight: 97.9 kg       Assessment/Plan   Assessment & Plan  Lumbar stenosis with neurogenic claudication    Intervertebral thoracic disc disorder with myelopathy, thoracic region    Peripheral neuropathy    PTSD (post-traumatic stress disorder)  Pt is a 75 yo M w/ h/o CAD w/ MI s/p stent on ASA, bradycardia w/ heart block, cardiac murmur, HTN, JOSE, HLD, GERD, BPH, depression, cervical myelopathy 4/15/2024 s/p C3-C7 ACDF (Dr. Mauricio), p/w thoracic myelopathy and BLE pain and neurogenic claudication, w/ R T8-T9 thoracic calcified disc herniation, cord compression and associated cord signal changes, L1-L2 and L3-L5 lumbar stenosis    S/p T8-T9 lami w/ R T9 transpedicular decompression and discectomy, T8-T10 posterolateral instrumented fusion, L3-L5 lami    Plan  Floor  Maintain drains  ASA restart POD7  PTOT             George Rogel MD      "

## 2024-10-10 NOTE — PROGRESS NOTES
Occupational Therapy    Evaluation    Patient Name: Misael Lopez  MRN: [id number]  Today's Date: 10/10/2024  Room: [address] [place] [address]  Time Calculation  Start Time: 0938  Stop Time: 0952  Time Calculation (min): 14 min    Assessment  IP OT Assessment  OT Assessment: Pt presents s/p elective T8-10 fusion and L3-5 laminectomy w/ new spinal precautions. Pt appears to be functioning close to PLOF and reports no concerns w/ ability to complete ADLs. Continued skilled OT services not indicated at this time.  Prognosis: Good  Barriers to Discharge: None  Evaluation/Treatment Tolerance: Patient tolerated treatment well  End of Session Communication: Bedside nurse  End of Session Patient Position: Up in chair, Alarm on  Plan:  Inpatient Plan  No Skilled OT: No acute OT goals identified  OT Frequency: OT eval only  OT Discharge Recommendations: No further acute OT, No OT needed after discharge  OT - OK to Discharge: Yes  OT Assessment  OT Assessment Results: Decreased endurance  Prognosis: Good  Barriers to Discharge: None  Evaluation/Treatment Tolerance: Patient tolerated treatment well  Strengths: Attitude of self, Ability to acquire knowledge, Capable of completing ADLs semi/independent, Housing layout, Support and attitude of living partners, Premorbid level of function  Barriers to Participation: Comorbidities    Subjective   Current Problem:  1. Lumbar stenosis with neurogenic claudication  Insert and maintain peripheral IV    Saline lock IV    Type And Screen    Full code    Place in outpatient/hospital ambulatory surgery    Insert and maintain peripheral IV    Saline lock IV    Type And Screen    Full code    Place in outpatient/hospital ambulatory surgery    Inpatient consult to Respiratory Care    Inpatient consult to Respiratory Care    DISCONTINUED: lactated Ringer's infusion    DISCONTINUED: ceFAZolin (Ancef) 2 g in dextrose (iso)  mL    CANCELED: NPO Diet Except: Sips with meds; Effective now    CANCELED: Height  and weight    CANCELED: NPO Diet Except: Sips with meds; Effective now    CANCELED: Height and weight      2. Intervertebral thoracic disc disorder with myelopathy, thoracic region  Insert and maintain peripheral IV    Saline lock IV    Type And Screen    Full code    Place in outpatient/hospital ambulatory surgery    Insert and maintain peripheral IV    Saline lock IV    Type And Screen    Full code    Place in outpatient/hospital ambulatory surgery    Inpatient consult to Respiratory Care    Inpatient consult to Respiratory Care    DISCONTINUED: lactated Ringer's infusion    DISCONTINUED: ceFAZolin (Ancef) 2 g in dextrose (iso)  mL    CANCELED: NPO Diet Except: Sips with meds; Effective now    CANCELED: Height and weight    CANCELED: NPO Diet Except: Sips with meds; Effective now    CANCELED: Height and weight        General:  Reason for Referral: This 73 y/o male w/ lumbar spinal stenosis w/ neurogenic claudication and intervertebral thoracic disc d/o w/ muelopathy presented 10/9 for elective T9 Transpedicular Decompression, T8-10 Fusion, L3-5 Laminectomy.  Past Medical History Relevant to Rehab: PMHx: anxiety, depression, osteoarthritis, lumbar spinal stenosis with neurogenic claudication, intervertebral thoracic disc disorder with myelopathy, CAD status post MI with PCI and stent placement, GERD, hypertension, hyperlipidemia, nephrolithiasis, obesity status post gastric bypass, sleep apnea compliant with CPAP  Prior to Session Communication: Bedside nurse  Patient Position Received: Up in chair, Alarm on  Family/Caregiver Present: No  General Comment: Pt seated in chair on approach. Pleasant and agreeable to OT session.   Precautions:  Medical Precautions: Fall precautions  Post-Surgical Precautions: Spinal precautions  Precautions Comment: Continued edu on spinal precautions and use of log roll technique w/ pt able to verbalize understanding    Pain:  Pain Assessment  Pain Assessment: 0-10  0-10  (Numeric) Pain Score: 4  Pain Type: Surgical pain  Pain Location: Back  Lines/Tubes/Drains:  Closed/Suction Drain Right;Superior Back Accordion 10 Fr. (Active)   Number of days: 0       Closed/Suction Drain Left Back Accordion 10 Fr. (Active)   Number of days: 0         Objective   Cognition:  Overall Cognitive Status: Within Functional Limits  Arousal/Alertness: Appropriate responses to stimuli  Orientation Level: Oriented X4  Following Commands: Follows all commands and directions without difficulty  Safety Judgment: Good awareness of safety precautions  Home Living:  Type of Home: House  Lives With: Spouse  Home Adaptive Equipment: None (Pt reports no equipment of his own, but does have access to any equipment needed through his spouse who is a [place] nurse)  Home Layout: One level  Home Access: Stairs to enter with rails  Entrance Stairs-Number of Steps: 3 MEGAN home and 2-3 steps down to bedrooms  Bathroom Shower/Tub: Walk-in shower  Bathroom Equipment: Grab bars in shower, Built-in shower seat, Grab bars around toilet   Prior Function:  Level of [place]: Independent with ADLs and functional transfers, Independent with homemaking with ambulation  ADL Assistance: Independent  Homemaking Assistance: Independent  Ambulatory Assistance: Independent (w/o AD)  Vocational: Retired ()  Hand Dominance: Right  IADL History:  Current License: Yes  Mode of Transportation: Car  ADL:  Eating Assistance: Independent  Grooming Assistance: Independent  Bathing Assistance: Stand by  UE Dressing Assistance: Independent  LE Dressing Assistance: Independent  Toileting Assistance with Device: Independent  Activity Tolerance:  Endurance: Endurance does not limit participation in activity  Bed Mobility/Transfers: Functional Mobility  Functional Mobility Performed: Yes   and Transfers  Transfer: Yes  Transfer 1  Transfer From 1: Sit to  Transfer to 1: Stand  Technique 1: Sit to stand, Stand to sit  Transfer Device 1:   (No AD)  Transfer Level of Assistance 1: Distant supervision  IADL's:   Current License: Yes  Mode of Transportation: Car  Vision: Vision - Basic Assessment  Current Vision: Wears glasses only for reading  Sensation:  Sensation Comment: No apparent deficits  Perception:  Inattention/Neglect: Appears intact  Coordination:  Movements are Fluid and Coordinated: Yes   Hand Function:  Hand Function  Gross Grasp: Functional  Coordination: Functional  Extremities: RUE   RUE : Within Functional Limits, LUE   LUE: Within Functional Limits  Outcome Measures: [facility] Daily Activity  Putting on and taking off regular lower body clothing: None  Bathing (including washing, rinsing, drying): A little  Putting on and taking off regular upper body clothing: None  Toileting, which includes using toilet, bedpan or urinal: None  Taking care of personal grooming such as brushing teeth: None  Eating Meals: None  Daily Activity - Total Score: 23         ,     OT Adult Other Outcome Measures  4AT: 0    Education Documentation  Body Mechanics, taught by Lala Blackwood OT at 10/10/2024 11:32 AM.  Learner: Patient  Readiness: Acceptance  Method: Explanation  Response: Verbalizes Understanding    Precautions, taught by Lala Blackwood OT at 10/10/2024 11:32 AM.  Learner: Patient  Readiness: Acceptance  Method: Explanation  Response: Verbalizes Understanding    ADL Training, taught by Lala Blackwood OT at 10/10/2024 11:32 AM.  Learner: Patient  Readiness: Acceptance  Method: Explanation  Response: Verbalizes Understanding    Education Comments  No comments found.      10/10/24 at 11:32 AM   LALA BLACKWOOD OT   Rehab Office: [phone]

## 2024-10-10 NOTE — CARE PLAN
Problem: Pain  Goal: Turns in bed with improved pain control throughout the shift  Outcome: Progressing  Goal: Free from opioid side effects throughout the shift  Outcome: Progressing     Problem: Safety - Adult  Goal: Free from fall injury  Outcome: Progressing   The patient's goals for the shift include being able to rest with an adequate pain level.     The clinical goals for the shift include Pt will remain HDS throughout the shift.    Over the shift, the patient did make progress and had an adequate pain level throughout the night. Turning and repositioning helped with pain control.

## 2024-10-10 NOTE — PROGRESS NOTES
Physical Therapy    Physical Therapy Evaluation & Treatment    Patient Name: Misael Lopez  MRN: [id number]  Department: [facility] [address]  Room: [address] [place] [address]  Today's Date: 10/10/2024   Time Calculation  Start Time: 0820  Stop Time: 0849  Time Calculation (min): 29 min    Assessment/Plan   PT Assessment  PT Assessment Results: Impaired balance, Decreased mobility, Pain, Orthopedic restrictions  Rehab Prognosis: Excellent  Barriers to Discharge: drains  Medical Staff Made Aware: Yes  Strengths: Attitude of self, Housing layout, Support of Caregivers, Premorbid level of function  End of Session Communication: Bedside nurse  Assessment Comment: 75 yo male, typ Ind with no AD.  S/p T/L spinal surgery, limited by pain and deficits in balance, requirin close supv to ambulate in [place]. Would benefit from cont PT while in hosp to address deficits and facilitate return to PLOF.  End of Session Patient Position: Up in chair, Alarm on   IP OR SWING BED PT PLAN  Inpatient or Swing Bed: Inpatient  PT Plan  Treatment/Interventions: Transfer training, Gait training, Stair training, Balance training, Therapeutic activity, Home exercise program  PT Plan: Ongoing PT  PT Frequency: Daily  PT Discharge Recommendations: No PT needed after discharge  PT Recommended Transfer Status: Assist x1, Stand by assist  PT - OK to Discharge: Yes (Meaning pt has been evaluated and d/c recc is in place)      Subjective     General Visit Information:  General  Reason for Referral: s/p T8-T9 lami w/ R T9 transpedicular decompression and discectomy, T8-T10 PLIF, L3-L5 lami  Past Medical History Relevant to Rehab: CAD w/ MI s/p stent on ASA, bradycardia w/ heart block, cardiac murmur, HTN, JOSE, HLD, GERD, BPH, depression, cervical myelopathy 4/15/2024 s/p C3-C7 ACDF, p/w thoracic myelopathy and BLE pain and neurogenic claudication, w/ R T8-T9 thoracic calcified disc herniation, cord compression and associated cord signal changes, L1-L2 and L3-L5 lumbar  stenosis  Family/Caregiver Present: No  Prior to Session Communication: Bedside nurse  Patient Position Received: Bed, 3 rail up, Alarm on  General Comment: Supine.  Pt pleasant, cooperative, agreeable to PT.  Needing to go to bathroom, and then able to ambulate in hallway.  Tolerated well.  Home Living:  Home Living  Type of Home: House (ranch)  Lives With: Spouse  Home Adaptive Equipment: None (Pt reports no equipment of his own, but has access to any equipment through his wife who is a [place] nurse)  Home Layout: One level  Home Access: Stairs to enter with rails  Bathroom Shower/Tub: Walk-in shower  Bathroom Equipment: Grab bars around toilet  Prior Level of Function:  Prior Function Per Pt/Caregiver Report  Level of [place]: Independent with ADLs and functional transfers  Ambulatory Assistance: Independent (community ambulator with no AD)  Vocational: Retired (was )  Precautions:  Precautions  Medical Precautions: Fall precautions  Post-Surgical Precautions: Spinal precautions  Precautions Comment: Issued and reviewed HOs for log roll and L/T spinal precautions    Objective   Pain:  Pain Assessment  Pain Assessment: 0-10  0-10 (Numeric) Pain Score: 3  Pain Type: Surgical pain  Pain Location: Back  Pain Interventions: Ambulation/increased activity, Relaxation technique, Repositioned  Cognition:  Cognition  Overall Cognitive Status: Within Functional Limits  Arousal/Alertness: Appropriate responses to stimuli  Orientation Level: Oriented X4  Following Commands: Follows all commands and directions without difficulty  Safety Judgment: Good awareness of safety precautions    General Assessments:    Activity Tolerance  Endurance: Endurance does not limit participation in activity    Sensation  Light Touch: No apparent deficits    Strength  Strength Comments: BLE WFL  Strength  Strength Comments: BLE WFL    Perception  Inattention/Neglect: Appears intact    Coordination  Movements are Fluid and  Coordinated: Yes    Postural Control  Postural Control: Within Functional Limits    Static Sitting Balance  Static Sitting-Level of Assistance: Distant supervision  Dynamic Sitting Balance  Dynamic Sitting-Level of Assistance: Close supervision    Static Standing Balance  Static Standing-Level of Assistance: Close supervision  Dynamic Standing Balance  Dynamic Standing-Level of Assistance: Close supervision  Functional Assessments:     Bed Mobility  Bed Mobility: Yes  Bed Mobility 1  Bed Mobility 1: Supine to sitting, Log roll  Level of Assistance 1: Modified independent ((already familiar from previous c-spine surgery))    Transfers  Transfer: Yes  Transfer 1  Transfer From 1: Sit to, Stand to  Transfer to 1: Sit  Transfer Level of Assistance 1: Close supervision, Minimal verbal cues  Transfers 2  Transfer From 2: Stand to, Toilet to  Transfer to 2: Stand  Transfer Level of Assistance 2: Close supervision, Minimal verbal cues    Ambulation/Gait Training  Ambulation/Gait Training Performed: Yes  Ambulation/Gait Training 1  Surface 1: Level tile, Carpet  Device 1: No device  Assistance 1: Close supervision, Minimal verbal cues  Quality of Gait 1: Wide base of support (Occasional lateral sway to R and path deviation but no LOB)  Comments/Distance (ft) 1: 15 feet, seated break, 200 feet    Stairs  Stairs: No    Extremity/Trunk Assessments:    RLE   RLE : Within Functional Limits  LLE   LLE : Within Functional Limits  Treatments:    Bed Mobility  Bed Mobility: Yes  Bed Mobility 1  Bed Mobility 1: Supine to sitting, Log roll  Level of Assistance 1: Modified independent ((already familiar from previous c-spine surgery))    Ambulation/Gait Training  Ambulation/Gait Training Performed: Yes  Ambulation/Gait Training 1  Surface 1: Level tile, Carpet  Device 1: No device  Assistance 1: Close supervision, Minimal verbal cues  Quality of Gait 1: Wide base of support (Occasional lateral sway to R and path deviation but no  LOB)  Comments/Distance (ft) 1: 15 feet, seated break, 200 feet  Transfers  Transfer: Yes  Transfer 1  Transfer From 1: Sit to, Stand to  Transfer to 1: Sit  Transfer Level of Assistance 1: Close supervision, Minimal verbal cues  Transfers 2  Transfer From 2: Stand to, Toilet to  Transfer to 2: Stand  Transfer Level of Assistance 2: Close supervision, Minimal verbal cues    Stairs  Stairs: No    Outcome Measures:    [facility] Basic Mobility  Turning from your back to your side while in a flat bed without using bedrails: None  Moving from lying on your back to sitting on the side of a flat bed without using bedrails: None  Moving to and from bed to chair (including a wheelchair): A little  Standing up from a chair using your arms (e.g. wheelchair or bedside chair): A little  To walk in hospital room: A little  Climbing 3-5 steps with railing: A little  Basic Mobility - Total Score: 20    Encounter Problems       Encounter Problems (Active)       Balance       Tinetti>24 to reflect improvement in balance and decreased falls risk  (Progressing)       Start:  10/10/24    Expected End:  10/24/24               Mobility       Ambulate >300 feet, no device, Ind  (Progressing)       Start:  10/10/24    Expected End:  10/24/24            Up/dn 3 stairs, Ind (Progressing)       Start:  10/10/24    Expected End:  10/24/24               PT Transfers       sit<>stand, bed<>chair, no device, Ind  (Progressing)       Start:  10/10/24    Expected End:  10/24/24               Pain - Adult              Education Documentation  Handouts, taught by George Turpin, PT at 10/10/2024  9:59 AM.  Learner: Patient  Readiness: Eager  Method: Explanation, Demonstration, Handout  Response: Verbalizes Understanding    Precautions, taught by George Turpin, PT at 10/10/2024  9:59 AM.  Learner: Patient  Readiness: Eager  Method: Explanation, Demonstration, Handout  Response: Verbalizes Understanding    Body Mechanics, taught by George Turpin,  PT at 10/10/2024  9:59 AM.  Learner: Patient  Readiness: Eager  Method: Explanation, Demonstration, Handout  Response: Verbalizes Understanding    Mobility Training, taught by George Turpin, PT at 10/10/2024  9:59 AM.  Learner: Patient  Readiness: Eager  Method: Explanation, Demonstration, Handout  Response: Verbalizes Understanding    Education Comments  No comments found.

## 2024-10-10 NOTE — PROGRESS NOTES
10/10/24 1300   Discharge Planning   Living Arrangements Spouse/significant other   Support Systems Spouse/significant other   Assistance Needed none   Type of Residence Private residence   Number of Stairs to Enter Residence 0   Number of Stairs Within Residence 3   Do you have animals or pets at home? No   Who is requesting discharge planning? Other (Comment)  (TCC assessment)   Home or Post Acute Services Other (Comment)  (OP therapy)   Does the patient need discharge transport arranged? No   Financial Resource Strain   How hard is it for you to pay for the very basics like food, housing, medical care, and heating? Not very   Housing Stability   In the last 12 months, was there a time when you were not able to pay the mortgage or rent on time? N   In the past 12 months, how many times have you moved where you were living? 1   At any time in the past 12 months, were you homeless or living in a shelter (including now)? N   Transportation Needs   In the past 12 months, has lack of transportation kept you from medical appointments or from getting medications? no   In the past 12 months, has lack of transportation kept you from meetings, work, or from getting things needed for daily living? No     Transitional Care Coordinator Note: Met with patient to discuss discharge planning s/p admission.  Patient lives home with  spouse([place] as listed).  Independent in ADL's. Requires no assist devices for ambulation.  Patient denies active home care or home care needs.  Demographics and contact information confirmed.  Will continue to monitor patient for all home going needs.  Davida Hart RN TCC via Epic.    Falls- 2yrs ago  Equipment- none   HC- none, but patient prefers OP therapy at [facility]-  facility   DM-none   Dialysis- none   O2/Cpap- cpap at bedside unsure of DME company   SW- none  PCP- Dr. Gibbs private practice  Pharm- [facility] [place]  Transport at [facility]- spouse

## 2024-10-10 NOTE — ASSESSMENT & PLAN NOTE
Pt is a 75 yo M w/ h/o CAD w/ MI s/p stent on ASA, bradycardia w/ heart block, cardiac murmur, HTN, JOSE, HLD, GERD, BPH, depression, cervical myelopathy 4/15/2024 s/p C3-C7 ACDF (Dr. Mauricio), p/w thoracic myelopathy and BLE pain and neurogenic claudication, w/ R T8-T9 thoracic calcified disc herniation, cord compression and associated cord signal changes, L1-L2 and L3-L5 lumbar stenosis    S/p T8-T9 lami w/ R T9 transpedicular decompression and discectomy, T8-T10 posterolateral instrumented fusion, L3-L5 lami    Plan  Floor  Maintain drains  ASA restart POD7  PTOT

## 2024-10-11 LAB
ALBUMIN SERPL BCP-MCNC: 4.1 G/DL (ref 3.4–5)
ANION GAP SERPL CALC-SCNC: 14 MMOL/L (ref 10–20)
BUN SERPL-MCNC: 20 MG/DL (ref 6–23)
CALCIUM SERPL-MCNC: 9.3 MG/DL (ref 8.6–10.6)
CHLORIDE SERPL-SCNC: 102 MMOL/L (ref 98–107)
CO2 SERPL-SCNC: 26 MMOL/L (ref 21–32)
CREAT SERPL-MCNC: 0.99 MG/DL (ref 0.5–1.3)
EGFRCR SERPLBLD CKD-EPI 2021: 80 ML/MIN/1.73M*2
GLUCOSE SERPL-MCNC: 112 MG/DL (ref 74–99)
PHOSPHATE SERPL-MCNC: 2.7 MG/DL (ref 2.5–4.9)
POTASSIUM SERPL-SCNC: 4.2 MMOL/L (ref 3.5–5.3)
SODIUM SERPL-SCNC: 138 MMOL/L (ref 136–145)

## 2024-10-11 PROCEDURE — 36415 COLL VENOUS BLD VENIPUNCTURE: CPT | Performed by: STUDENT IN AN ORGANIZED HEALTH CARE EDUCATION/TRAINING PROGRAM

## 2024-10-11 PROCEDURE — 2500000004 HC RX 250 GENERAL PHARMACY W/ HCPCS (ALT 636 FOR OP/ED)

## 2024-10-11 PROCEDURE — 2500000005 HC RX 250 GENERAL PHARMACY W/O HCPCS: Performed by: STUDENT IN AN ORGANIZED HEALTH CARE EDUCATION/TRAINING PROGRAM

## 2024-10-11 PROCEDURE — 1200000002 HC GENERAL ROOM WITH TELEMETRY DAILY

## 2024-10-11 PROCEDURE — 99024 POSTOP FOLLOW-UP VISIT: CPT | Performed by: STUDENT IN AN ORGANIZED HEALTH CARE EDUCATION/TRAINING PROGRAM

## 2024-10-11 PROCEDURE — 80069 RENAL FUNCTION PANEL: CPT

## 2024-10-11 PROCEDURE — 2500000002 HC RX 250 W HCPCS SELF ADMINISTERED DRUGS (ALT 637 FOR MEDICARE OP, ALT 636 FOR OP/ED): Performed by: STUDENT IN AN ORGANIZED HEALTH CARE EDUCATION/TRAINING PROGRAM

## 2024-10-11 PROCEDURE — 97530 THERAPEUTIC ACTIVITIES: CPT | Mod: GP,CQ

## 2024-10-11 PROCEDURE — 2500000001 HC RX 250 WO HCPCS SELF ADMINISTERED DRUGS (ALT 637 FOR MEDICARE OP)

## 2024-10-11 PROCEDURE — 2500000001 HC RX 250 WO HCPCS SELF ADMINISTERED DRUGS (ALT 637 FOR MEDICARE OP): Performed by: STUDENT IN AN ORGANIZED HEALTH CARE EDUCATION/TRAINING PROGRAM

## 2024-10-11 PROCEDURE — 2500000004 HC RX 250 GENERAL PHARMACY W/ HCPCS (ALT 636 FOR OP/ED): Performed by: STUDENT IN AN ORGANIZED HEALTH CARE EDUCATION/TRAINING PROGRAM

## 2024-10-11 RX ORDER — BISACODYL 5 MG
5 TABLET, DELAYED RELEASE (ENTERIC COATED) ORAL DAILY PRN
Status: DISCONTINUED | OUTPATIENT
Start: 2024-10-11 | End: 2024-10-12 | Stop reason: HOSPADM

## 2024-10-11 RX ORDER — AMOXICILLIN 250 MG
1 CAPSULE ORAL DAILY
Status: DISCONTINUED | OUTPATIENT
Start: 2024-10-11 | End: 2024-10-12 | Stop reason: HOSPADM

## 2024-10-11 ASSESSMENT — PAIN SCALES - GENERAL
PAINLEVEL_OUTOF10: 3
PAINLEVEL_OUTOF10: 1
PAINLEVEL_OUTOF10: 1
PAINLEVEL_OUTOF10: 0 - NO PAIN
PAINLEVEL_OUTOF10: 5 - MODERATE PAIN
PAINLEVEL_OUTOF10: 0 - NO PAIN
PAINLEVEL_OUTOF10: 3

## 2024-10-11 ASSESSMENT — COGNITIVE AND FUNCTIONAL STATUS - GENERAL
HELP NEEDED FOR BATHING: A LITTLE
STANDING UP FROM CHAIR USING ARMS: A LITTLE
WALKING IN HOSPITAL ROOM: A LITTLE
DAILY ACTIVITIY SCORE: 22
STANDING UP FROM CHAIR USING ARMS: A LITTLE
STANDING UP FROM CHAIR USING ARMS: A LITTLE
CLIMB 3 TO 5 STEPS WITH RAILING: A LITTLE
MOBILITY SCORE: 20
MOBILITY SCORE: 21
MOBILITY SCORE: 21
WALKING IN HOSPITAL ROOM: A LITTLE
MOVING TO AND FROM BED TO CHAIR: A LITTLE
CLIMB 3 TO 5 STEPS WITH RAILING: A LITTLE
CLIMB 3 TO 5 STEPS WITH RAILING: A LITTLE
WALKING IN HOSPITAL ROOM: A LITTLE
DRESSING REGULAR LOWER BODY CLOTHING: A LITTLE

## 2024-10-11 ASSESSMENT — PAIN - FUNCTIONAL ASSESSMENT
PAIN_FUNCTIONAL_ASSESSMENT: 0-10

## 2024-10-11 NOTE — PROGRESS NOTES
"Misael Lopez is a 74 y.o. male on day 2 of admission presenting with Lumbar stenosis with neurogenic claudication.    Subjective   No acute events overnight, walking around during the day, passing gas    Objective     Physical Exam  Awake  Ox3  BUE 5/5  BLE 5/5  Incision c/d/i    Last Recorded Vitals  Blood pressure 134/77, pulse 70, temperature 36.1 °C (97 °F), temperature source Temporal, resp. rate 18, height 1.702 m (5' 7\"), weight 97.9 kg (215 lb 13.3 oz), SpO2 95%.  Intake/Output last 3 Shifts:  I/O last 3 completed shifts:  In: 1620 (16.5 mL/kg) [P.O.:820; I.V.:800 (8.2 mL/kg)]  Out: 3675 (37.5 mL/kg) [Urine:2380 (0.7 mL/kg/hr); Drains:585; Blood:710]  Weight: 97.9 kg       Assessment/Plan   Assessment & Plan  Lumbar stenosis with neurogenic claudication    Intervertebral thoracic disc disorder with myelopathy, thoracic region    Peripheral neuropathy    PTSD (post-traumatic stress disorder)  Pt is a 75 yo M w/ h/o CAD w/ MI s/p stent on ASA, bradycardia w/ heart block, cardiac murmur, HTN, JOSE, HLD, GERD, BPH, depression, cervical myelopathy 4/15/2024 s/p C3-C7 ACDF (Dr. Mauricio), p/w thoracic myelopathy and BLE pain and neurogenic claudication, w/ R T8-T9 thoracic calcified disc herniation, cord compression and associated cord signal changes, L1-L2 and L3-L5 lumbar stenosis    S/p T8-T9 lami w/ R T9 transpedicular decompression and discectomy, T8-T10 posterolateral instrumented fusion, L3-L5 lami    Plan  Floor  Maintain drains  AM labs  ASA restart POD7  PTOT-NN        Jeffrey Jordan MD      "

## 2024-10-11 NOTE — CARE PLAN
The patient's goals for the shift include      The clinical goals for the shift include patient will have a bowel movement with miralax administration and move to heal initiative within 48 hours    Over the shift, the patient did not make progress toward the following goals. Barriers to progression include recent surgery (and anesthesia administration), decreased walking in hospital environment, narcotic administration for pain control. Recommendations to address these barriers include promote moving to heal, miralax administration, nutritional supplements, and encouraging fluid and food intake.

## 2024-10-11 NOTE — PROGRESS NOTES
Physical Therapy    Physical Therapy Treatment    Patient Name: Misael Lopez  MRN: [id number]  Department: [facility] [address]  Room: [address] [place] [address]  Today's Date: 10/11/2024  Time Calculation  Start Time: 0949  Stop Time: 1003  Time Calculation (min): 14 min         Assessment/Plan   PT Assessment  End of Session Communication: Bedside nurse  Assessment Comment: Pt tolerated seated exercises and returning to supine from seated up in chair, however limited by feeling of dizziness/lightheadedness/distorted vision, RN notified, Will re-attempt stairs and mobility tomorrow. Pt continues to remain appropriate for No further PT needs upon D/C from hospital.  End of Session Patient Position: Bed, 3 rail up, Alarm off, not on at start of session  PT Plan  Inpatient/Swing Bed or Outpatient: Inpatient  PT Plan  Treatment/Interventions: Transfer training, Gait training, Stair training, Balance training, Therapeutic activity, Home exercise program  PT Plan: Ongoing PT  PT Frequency: Daily  PT Discharge Recommendations: No PT needed after discharge  PT Recommended Transfer Status: Stand by assist, Contact guard  PT - OK to Discharge: Yes (Meaning pt has been evaluated and d/c recc is in place)      General Visit Information:   PT  Visit  PT Received On: 10/11/24  General  Missed Visit: No  Missed Visit Reason:  (n/a)  Family/Caregiver Present: No  Prior to Session Communication: Bedside nurse (Mobility Aide)  Patient Position Received: Up in chair, Alarm off, not on at start of session  General Comment: Prior to entering pt's room, spoke with mobility aide who reports about to walk in hallway however increased dizziness, had sit in chair. Pt seated up in chair on arrival, agreeable to PT however reports feeling dizzy/lightheaded while seated in chair, vitals monitored.    Subjective   Precautions:  Precautions  Medical Precautions: Fall precautions  Post-Surgical Precautions: Spinal precautions       10/11/24 0949 10/11/24 1000   Vital Signs    Vitals Session Pre PT Post PT   Heart Rate 102 98   SpO2 94 % 95 %   /70 110/71       Objective   Pain:  Pain Assessment  Pain Assessment: 0-10  0-10 (Numeric) Pain Score: 0 - No pain  Cognition:  Cognition  Overall Cognitive Status: Within Functional Limits  Orientation Level: Oriented X4    Activity Tolerance:  Activity Tolerance  Endurance: Endurance does not limit participation in activity (Pt limited by dizziness/lightheadedness)  Treatments:  Therapeutic Exercise  Therapeutic Exercise Performed: Yes  Therapeutic Exercise Activity 1: Seated: Heel/Toe Raises, LAQ, Hip Flexion, Hip ADD x15 BLE    Therapeutic Activity  Therapeutic Activity Performed: Yes  Therapeutic Activity 1: Pt with continued lightheadness, assisted back to supine in bed, re-attempt at 12:01 however pt reports feeling as though vision is still distorted and making him feel dizzy/lightheaded.    Bed Mobility  Bed Mobility: Yes  Bed Mobility 1  Bed Mobility 1: Sitting to supine  Level of Assistance 1: Modified independent    Ambulation/Gait Training  Ambulation/Gait Training Performed: Yes  Ambulation/Gait Training 1  Surface 1: Level tile  Device 1: Rolling walker  Assistance 1: Contact guard  Quality of Gait 1: Wide base of support (Decreased jamison,)  Comments/Distance (ft) 1: x4ft    Transfers  Transfer: Yes  Transfer 1  Transfer From 1: Chair with arms to  Transfer to 1: Stand  Technique 1: Sit to stand  Transfer Device 1: Walker  Transfer Level of Assistance 1: Contact guard  Trials/Comments 1: x1 trial,  Transfers 2  Transfer From 2: Stand to  Transfer to 2: Bed  Technique 2: Stand to sit  Transfer Device 2: Walker  Transfer Level of Assistance 2: Close supervision  Trials/Comments 2: x1 trial    Stairs  Stairs: No (Deferred d/t increased dizziness/lightheadedness)    Outcome Measures:  [facility] Basic Mobility  Turning from your back to your side while in a flat bed without using bedrails: None  Moving from lying on your back to  sitting on the side of a flat bed without using bedrails: None  Moving to and from bed to chair (including a wheelchair): A little  Standing up from a chair using your arms (e.g. wheelchair or bedside chair): A little  To walk in hospital room: A little  Climbing 3-5 steps with railing: A little  Basic Mobility - Total Score: 20    Education Documentation  Precautions, taught by Leslie Price PTA at 10/11/2024 12:39 PM.  Learner: Patient  Readiness: Acceptance  Method: Explanation  Response: Verbalizes Understanding    Body Mechanics, taught by Leslie Price PTA at 10/11/2024 12:39 PM.  Learner: Patient  Readiness: Acceptance  Method: Explanation  Response: Verbalizes Understanding    Mobility Training, taught by Leslie Price PTA at 10/11/2024 12:39 PM.  Learner: Patient  Readiness: Acceptance  Method: Explanation  Response: Verbalizes Understanding    Education Comments  No comments found.        OP EDUCATION:       Encounter Problems       Encounter Problems (Active)       Balance       Tinetti>24 to reflect improvement in balance and decreased falls risk  (Progressing)       Start:  10/10/24    Expected End:  10/24/24               Mobility       Ambulate >300 feet, no device, Ind  (Progressing)       Start:  10/10/24    Expected End:  10/24/24            Up/dn 3 stairs, Ind (Progressing)       Start:  10/10/24    Expected End:  10/24/24               PT Transfers       sit<>stand, bed<>chair, no device, Ind  (Progressing)       Start:  10/10/24    Expected End:  10/24/24               Pain - Adult            10/11/24 at 12:41 PM   Leslie Price PTA   Rehab Office: [phone]

## 2024-10-11 NOTE — CARE PLAN
The patient's goals for the shift include  sleep    The clinical goals for the shift include Patient will remain safe and free from injury overnight.    Over the shift, the patient was safe and free from injury, and had a stable exam. No acute events overnight.

## 2024-10-11 NOTE — ASSESSMENT & PLAN NOTE
Pt is a 73 yo M w/ h/o CAD w/ MI s/p stent on ASA, bradycardia w/ heart block, cardiac murmur, HTN, JOSE, HLD, GERD, BPH, depression, cervical myelopathy 4/15/2024 s/p C3-C7 ACDF (Dr. Mauricio), p/w thoracic myelopathy and BLE pain and neurogenic claudication, w/ R T8-T9 thoracic calcified disc herniation, cord compression and associated cord signal changes, L1-L2 and L3-L5 lumbar stenosis    S/p T8-T9 lami w/ R T9 transpedicular decompression and discectomy, T8-T10 posterolateral instrumented fusion, L3-L5 lami    Plan  Floor  Maintain drains  AM labs  ASA restart POD7  PTOT-NN

## 2024-10-12 VITALS
DIASTOLIC BLOOD PRESSURE: 79 MMHG | WEIGHT: 215.83 LBS | RESPIRATION RATE: 20 BRPM | BODY MASS INDEX: 33.88 KG/M2 | TEMPERATURE: 97.9 F | HEART RATE: 76 BPM | OXYGEN SATURATION: 99 % | SYSTOLIC BLOOD PRESSURE: 130 MMHG | HEIGHT: 67 IN

## 2024-10-12 LAB
GLUCOSE BLD MANUAL STRIP-MCNC: 105 MG/DL (ref 74–99)
GLUCOSE BLD MANUAL STRIP-MCNC: 89 MG/DL (ref 74–99)

## 2024-10-12 PROCEDURE — 97116 GAIT TRAINING THERAPY: CPT | Mod: GP,CQ

## 2024-10-12 PROCEDURE — 2500000001 HC RX 250 WO HCPCS SELF ADMINISTERED DRUGS (ALT 637 FOR MEDICARE OP): Performed by: STUDENT IN AN ORGANIZED HEALTH CARE EDUCATION/TRAINING PROGRAM

## 2024-10-12 PROCEDURE — 2500000001 HC RX 250 WO HCPCS SELF ADMINISTERED DRUGS (ALT 637 FOR MEDICARE OP)

## 2024-10-12 PROCEDURE — 2500000005 HC RX 250 GENERAL PHARMACY W/O HCPCS: Performed by: STUDENT IN AN ORGANIZED HEALTH CARE EDUCATION/TRAINING PROGRAM

## 2024-10-12 PROCEDURE — 2500000002 HC RX 250 W HCPCS SELF ADMINISTERED DRUGS (ALT 637 FOR MEDICARE OP, ALT 636 FOR OP/ED): Performed by: STUDENT IN AN ORGANIZED HEALTH CARE EDUCATION/TRAINING PROGRAM

## 2024-10-12 PROCEDURE — 97530 THERAPEUTIC ACTIVITIES: CPT | Mod: GP,CQ

## 2024-10-12 PROCEDURE — 2500000004 HC RX 250 GENERAL PHARMACY W/ HCPCS (ALT 636 FOR OP/ED): Performed by: STUDENT IN AN ORGANIZED HEALTH CARE EDUCATION/TRAINING PROGRAM

## 2024-10-12 PROCEDURE — 82947 ASSAY GLUCOSE BLOOD QUANT: CPT

## 2024-10-12 RX ORDER — POLYETHYLENE GLYCOL 3350 17 G/17G
17 POWDER, FOR SOLUTION ORAL 3 TIMES DAILY
Qty: 21 PACKET | Refills: 0 | Status: SHIPPED | OUTPATIENT
Start: 2024-10-12 | End: 2024-10-19

## 2024-10-12 RX ORDER — BISACODYL 10 MG/1
10 SUPPOSITORY RECTAL DAILY
Status: DISCONTINUED | OUTPATIENT
Start: 2024-10-12 | End: 2024-10-12 | Stop reason: HOSPADM

## 2024-10-12 RX ORDER — LIDOCAINE 560 MG/1
1 PATCH PERCUTANEOUS; TOPICAL; TRANSDERMAL DAILY
Qty: 7 PATCH | Refills: 0 | Status: SHIPPED | OUTPATIENT
Start: 2024-10-12 | End: 2024-10-19

## 2024-10-12 RX ORDER — OXYCODONE HYDROCHLORIDE 5 MG/1
5 TABLET ORAL EVERY 6 HOURS PRN
Qty: 28 TABLET | Refills: 0 | Status: SHIPPED | OUTPATIENT
Start: 2024-10-12

## 2024-10-12 RX ORDER — ASPIRIN 81 MG/1
81 TABLET ORAL DAILY
Start: 2024-10-12

## 2024-10-12 RX ORDER — AMOXICILLIN 250 MG
1 CAPSULE ORAL DAILY
Qty: 7 TABLET | Refills: 0 | Status: SHIPPED | OUTPATIENT
Start: 2024-10-12 | End: 2024-10-19

## 2024-10-12 ASSESSMENT — PAIN - FUNCTIONAL ASSESSMENT
PAIN_FUNCTIONAL_ASSESSMENT: 0-10

## 2024-10-12 ASSESSMENT — PAIN SCALES - GENERAL
PAINLEVEL_OUTOF10: 4
PAINLEVEL_OUTOF10: 0 - NO PAIN
PAINLEVEL_OUTOF10: 3
PAINLEVEL_OUTOF10: 0 - NO PAIN

## 2024-10-12 ASSESSMENT — COGNITIVE AND FUNCTIONAL STATUS - GENERAL
WALKING IN HOSPITAL ROOM: A LITTLE
TURNING FROM BACK TO SIDE WHILE IN FLAT BAD: A LITTLE
MOVING FROM LYING ON BACK TO SITTING ON SIDE OF FLAT BED WITH BEDRAILS: A LITTLE
STANDING UP FROM CHAIR USING ARMS: A LITTLE
CLIMB 3 TO 5 STEPS WITH RAILING: A LITTLE
MOVING TO AND FROM BED TO CHAIR: A LITTLE
MOBILITY SCORE: 18

## 2024-10-12 ASSESSMENT — PAIN DESCRIPTION - LOCATION: LOCATION: BACK

## 2024-10-12 NOTE — NURSING NOTE
Discharge instructions, RX and follow up reviewed with pt and spouse. IV heplocks removed without incident, pt domingo well. Pt off unit in wheelchair, discharged home instable condition with wife.

## 2024-10-12 NOTE — CARE PLAN
Problem: Pain  Goal: Performs ADL's with improved pain control throughout shift  Outcome: Progressing  Goal: Participates in PT with improved pain control throughout the shift  Outcome: Progressing     Problem: Discharge Planning  Goal: Discharge to home or other facility with appropriate resources  Outcome: Progressing     Problem: Chronic Conditions and Co-morbidities  Goal: Patient's chronic conditions and co-morbidity symptoms are monitored and maintained or improved  Outcome: Progressing     Problem: Skin  Goal: Decreased wound size/increased tissue granulation at next dressing change  Outcome: Progressing  Goal: Participates in plan/prevention/treatment measures  Outcome: Progressing  Goal: Prevent/manage excess moisture  Outcome: Progressing  Goal: Prevent/minimize sheer/friction injuries  Outcome: Progressing  Goal: Promote/optimize nutrition  Outcome: Progressing  Goal: Promote skin healing  Outcome: Progressing   The patient's goals for the shift include      The clinical goals for the shift include Patient will remain safe and free from injury overnight.

## 2024-10-12 NOTE — CARE PLAN
The patient's goals for the shift include  sleep    The clinical goals for the shift include Patient will remain safe and free from injury overnight.    Over the shift, the patient was safe and free from injury, and had a stable exam. Minimal pain overnight.  Problem: Pain - Adult  Goal: Verbalizes/displays adequate comfort level or baseline comfort level  Outcome: Progressing     Problem: Discharge Planning  Goal: Discharge to home or other facility with appropriate resources  Outcome: Progressing     Problem: Chronic Conditions and Co-morbidities  Goal: Patient's chronic conditions and co-morbidity symptoms are monitored and maintained or improved  Outcome: Progressing     Problem: Pain  Goal: Walks with improved pain control throughout the shift  Outcome: Met

## 2024-10-12 NOTE — ASSESSMENT & PLAN NOTE
Pt is a 75 yo M w/ h/o CAD w/ MI s/p stent on ASA, bradycardia w/ heart block, cardiac murmur, HTN, JOSE, HLD, GERD, BPH, depression, cervical myelopathy 4/15/2024 s/p C3-C7 ACDF (Dr. Mauricio), p/w thoracic myelopathy and BLE pain and neurogenic claudication, w/ R T8-T9 thoracic calcified disc herniation, cord compression and associated cord signal changes, L1-L2 and L3-L5 lumbar stenosis    S/p T8-T9 lami w/ R T9 transpedicular decompression and discectomy, T8-T10 posterolateral instrumented fusion, L3-L5 lami    Plan  Floor  Maintain drains, will likely dc this AM   AM labs  ASA restart POD7  PTOT-NN

## 2024-10-12 NOTE — DISCHARGE SUMMARY
Discharge Diagnosis  Lumbar stenosis with neurogenic claudication    Issues Requiring Follow-Up  Incision check  Scheduled follow up    Test Results Pending At Discharge  Pending Labs       Order Current Status    CBC Collected (10/11/24 1253)            Hospital Course  Misael Lopez is a 74 year old male with a past medical history of CAD with MI s/p stent on ASA, bradycardia with heart block, cardiac murmur, HTN, JOSE, HLD, GERD, BPH, depression and cervical myelopathy 4/15/2024 s/p C3-C7 ACDF (Dr. Mauricio) who presented with thoracic myelopathy and BLE pain and neurogenic claudication. Noted R T8-T9 thoracic calcified disc herniation, cord compression and associated cord signal changes, L1-L2 and L3-L5 lumbar stenosis. Admitted to neurosurgery for further evaluation and treatment.     10/9 S/p T8-T9 lami w/ R T9 transpedicular decompression and discectomy, T8-T10 posterolateral instrumented fusion, L3-L5 lami  10/11 drain off suction d/t higher output, complaints of feeling lightheaded and mild hypotension, improved with fluids.     PT/OT eval and recommended no further therapy at time of discharge.    On the day of discharge, the patient was seen and evaluated by the neurosurgery team and deemed suitable for discharge home. There were no significant events overnight. Vitals were reviewed and within normal limits. Labs were stable at discharge. On day of discharge the patient was tolerating a diet, pain was controlled on PO pain medication, was ambulating well and voiding spontaneously. The patient was given detailed discharge instructions and were scheduled to follow up as an outpatient.           Pertinent Physical Exam At Time of Discharge  Physical Exam  Awake, alert  Ox3  BUE 5/5  BLE 5/5  Incision c/d/i  Home Medications     Medication List      START taking these medications     lidocaine 4 % patch; Place 1 patch over 12 hours on the skin once daily   for 7 days. Remove & discard patch within 12 hours or as  directed by MD.   oxyCODONE 5 mg immediate release tablet; Commonly known as: Roxicodone;   Take 1 tablet (5 mg) by mouth every 6 hours if needed for severe pain (7 -   10).   polyethylene glycol 17 gram packet; Commonly known as: Glycolax,   Miralax; Take 17 g by mouth 3 times a day for 7 days.   sennosides-docusate sodium 8.6-50 mg tablet; Commonly known as:   Catie-Colace; Take 1 tablet by mouth once daily for 7 days.     CHANGE how you take these medications     aspirin 81 mg EC tablet; Take 1 tablet (81 mg) by mouth once daily. DO   NOT RESUME UNTIL POST OP DAY 7 (10/16/24); What changed: additional   instructions     CONTINUE taking these medications     acetaminophen 325 mg tablet; Commonly known as: Tylenol; Take 3 tablets   (975 mg) by mouth every 8 hours if needed for mild pain (1 - 3) (post op   pain).   atorvastatin 20 mg tablet; Commonly known as: Lipitor; Take 1 tablet (20   mg) by mouth once daily.   bromocriptine 5 mg capsule; Commonly known as: Parlodel   calcium acetate 667 mg tablet; Commonly known as: Phoslo   cyanocobalamin 100 mcg tablet; Commonly known as: Vitamin B-12   cyclobenzaprine 10 mg tablet; Commonly known as: Flexeril; Take 1 tablet   (10 mg) by mouth 3 times a day as needed for muscle spasms (post op pain).   FiberCon 625 mg tablet; Generic drug: calcium polycarbophiL   gabapentin 100 mg capsule; Commonly known as: Neurontin; Take 1 capsule   (100 mg) by mouth 3 times a day.   mirtazapine 15 mg tablet; Commonly known as: Remeron   multivitamin with minerals tablet   pantoprazole 40 mg EC tablet; Commonly known as: ProtoNix   PROzac 20 mg capsule; Generic drug: FLUoxetine   ranolazine 500 mg 12 hr tablet; Commonly known as: Ranexa; Take 1 tablet   (500 mg) by mouth every 12 hours.   tamsulosin 0.4 mg 24 hr capsule; Commonly known as: Flomax     STOP taking these medications     chlorhexidine 0.12 % solution; Commonly known as: Peridex       Outpatient Follow-Up  Future Appointments    Date Time Provider Department [place]   10/30/2024  2:30 PM Tan Thomas PA-C [id number] [place]   12/5/2024  1:30 PM Davonte Valdez MD [id number] [place]   1/6/2025 10:00 AM Radha Ching MD [id number] [place]       Tristan Gregory MD

## 2024-10-12 NOTE — PROGRESS NOTES
Physical Therapy    Physical Therapy Treatment    Patient Name: Misael Lopez  MRN: [id number]  Department: [place] [address]  Room: [address] [place] [address]  Today's Date: 10/12/2024  Time Calculation  Start Time: 0920  Stop Time: 0944  Time Calculation (min): 24 min         Assessment/Plan   PT Assessment  PT Assessment Results: Decreased strength, Decreased endurance, Impaired balance  Rehab Prognosis: Excellent  Barriers to Discharge: x2 hemovacs (spoke to MD who will be pulling later today)  Evaluation/Treatment Tolerance: Patient tolerated treatment well  Medical Staff Made Aware: Yes  Strengths: Ability to acquire knowledge, Attitude of self, Support of Caregivers  End of Session Communication: Bedside nurse  Assessment Comment: Patient making great progress towards stated therapy goals and remains safe for d/c home with family assist.  End of Session Patient Position: Up in chair, Alarm off, not on at start of session  PT Plan  Inpatient/Swing Bed or Outpatient: Inpatient  PT Plan  Treatment/Interventions: Transfer training, Gait training, Stair training, Balance training, Therapeutic activity, Home exercise program  PT Plan: Ongoing PT  PT Frequency: Daily  PT Discharge Recommendations: No PT needed after discharge  PT Recommended Transfer Status: Stand by assist, Contact guard  PT - OK to Discharge: Yes (Meaning pt has been evaluated and d/c recc is in place)      General Visit Information:   PT  Visit  PT Received On: 10/12/24  Response to Previous Treatment: Patient with no complaints from previous session.  General  Family/Caregiver Present: No  Prior to Session Communication: Bedside nurse  Patient Position Received: Up in chair, Alarm off, not on at start of session  Preferred Learning Style: visual, verbal  General Comment: Patient pleasant and willing to participate in therapy session . Excited for potential d/c home today.    Subjective   Precautions:  Precautions  Medical Precautions: Fall precautions  Post-Surgical  Precautions: Spinal precautions  Precautions Comment: x2 hemovacs      Objective   Pain:  Pain Assessment  Pain Assessment: 0-10  0-10 (Numeric) Pain Score: 3  Pain Type: Surgical pain  Pain Location: Back  Cognition:  Cognition  Orientation Level: Oriented X4  Coordination:     Postural Control:  Static Standing Balance  Static Standing-Level of Assistance: Close supervision  Dynamic Standing Balance  Dynamic Standing-Level of Assistance: Close supervision  Dynamic Standing-Balance: Turning    Activity Tolerance:  Activity Tolerance  Endurance: Endurance does not limit participation in activity  Treatments:       Ambulation/Gait Training  Ambulation/Gait Training Performed: Yes  Ambulation/Gait Training 1  Surface 1: Level tile  Device 1: Rolling walker  Assistance 1: Close supervision  Quality of Gait 1: Decreased step length  Comments/Distance (ft) 1: 100 ft x 2  Transfers  Transfer: Yes  Transfer 1  Transfer From 1: Sit to, Stand to  Transfer to 1: Stand, Sit  Technique 1: Sit to stand, Stand to sit  Transfer Device 1: Walker  Transfer Level of Assistance 1: Contact guard    Stairs  Stairs: Yes  Stairs  Rails 1: Right  Curb Step 1: No  Device 1: Railing  Assistance 1: Contact guard  Comment/Number of Steps 1: 3 steps    Outcome Measures:  [facility] Basic Mobility  Turning from your back to your side while in a flat bed without using bedrails: A little  Moving from lying on your back to sitting on the side of a flat bed without using bedrails: A little  Moving to and from bed to chair (including a wheelchair): A little  Standing up from a chair using your arms (e.g. wheelchair or bedside chair): A little  To walk in hospital room: A little  Climbing 3-5 steps with railing: A little  Basic Mobility - Total Score: 18    Education Documentation  Handouts, taught by María Webb PTA at 10/12/2024 11:44 AM.  Learner: Patient  Readiness: Acceptance  Method: Explanation  Response: Verbalizes Understanding  Comment:  Reviewed spinal precautions with good understanding    Precautions, taught by María Webb PTA at 10/12/2024 11:44 AM.  Learner: Patient  Readiness: Acceptance  Method: Explanation  Response: Verbalizes Understanding  Comment: Reviewed spinal precautions with good understanding    Body Mechanics, taught by María Webb PTA at 10/12/2024 11:44 AM.  Learner: Patient  Readiness: Acceptance  Method: Explanation  Response: Verbalizes Understanding  Comment: Reviewed spinal precautions with good understanding    Mobility Training, taught by María Webb PTA at 10/12/2024 11:44 AM.  Learner: Patient  Readiness: Acceptance  Method: Explanation  Response: Verbalizes Understanding  Comment: Reviewed spinal precautions with good understanding    Education Comments  No comments found.        OP EDUCATION:       Encounter Problems       Encounter Problems (Active)       Balance       Tinetti>24 to reflect improvement in balance and decreased falls risk  (Progressing)       Start:  10/10/24    Expected End:  10/24/24               Mobility       Ambulate >300 feet, no device, Ind  (Progressing)       Start:  10/10/24    Expected End:  10/24/24            Up/dn 3 stairs, Ind (Progressing)       Start:  10/10/24    Expected End:  10/24/24               PT Transfers       sit<>stand, bed<>chair, no device, Ind  (Progressing)       Start:  10/10/24    Expected End:  10/24/24               Pain - Adult

## 2024-10-12 NOTE — PROGRESS NOTES
"Misael Lopez is a 74 y.o. male on day 3 of admission presenting with Lumbar stenosis with neurogenic claudication.    Subjective   No acute events overnight, wants to go home     Objective     Physical Exam  Awake  Ox3  BUE 5/5  BLE 5/5  Incision c/d/i    Last Recorded Vitals  Blood pressure 128/80, pulse 66, temperature 35.7 °C (96.3 °F), temperature source Temporal, resp. rate 15, height 1.702 m (5' 7\"), weight 97.9 kg (215 lb 13.3 oz), SpO2 98%.  Intake/Output last 3 Shifts:  I/O last 3 completed shifts:  In: 2120 (21.7 mL/kg) [P.O.:820; I.V.:300 (3.1 mL/kg); IV Piggyback:1000]  Out: 2715 (27.7 mL/kg) [Urine:2025 (0.6 mL/kg/hr); Drains:690]  Weight: 97.9 kg       Assessment/Plan   Assessment & Plan  Lumbar stenosis with neurogenic claudication    Intervertebral thoracic disc disorder with myelopathy, thoracic region    Peripheral neuropathy    PTSD (post-traumatic stress disorder)  Pt is a 75 yo M w/ h/o CAD w/ MI s/p stent on ASA, bradycardia w/ heart block, cardiac murmur, HTN, JOSE, HLD, GERD, BPH, depression, cervical myelopathy 4/15/2024 s/p C3-C7 ACDF (Dr. Mauricio), p/w thoracic myelopathy and BLE pain and neurogenic claudication, w/ R T8-T9 thoracic calcified disc herniation, cord compression and associated cord signal changes, L1-L2 and L3-L5 lumbar stenosis    S/p T8-T9 lami w/ R T9 transpedicular decompression and discectomy, T8-T10 posterolateral instrumented fusion, L3-L5 lami    Plan  Floor  Maintain drains, will likely dc this AM   AM labs  ASA restart POD7  PTOT-NN    Likely dispo this AM       Elizabeth Poole MD      "

## 2024-10-24 DIAGNOSIS — L08.9 SKIN INFECTION: Primary | ICD-10-CM

## 2024-10-24 RX ORDER — SULFAMETHOXAZOLE AND TRIMETHOPRIM 800; 160 MG/1; MG/1
1 TABLET ORAL 2 TIMES DAILY
Qty: 14 TABLET | Refills: 0 | Status: SHIPPED | OUTPATIENT
Start: 2024-10-24 | End: 2024-10-31

## 2024-10-24 NOTE — PROGRESS NOTES
Images sent of incisions via messenger. Discussed monitoring for receding of redness for one day but did sent Rx for Bactrim to pharmacy in event there is no improvement.

## 2024-10-30 ENCOUNTER — OFFICE VISIT (OUTPATIENT)
Dept: NEUROSURGERY | Facility: CLINIC | Age: 74
End: 2024-10-30
Payer: MEDICARE

## 2024-10-30 DIAGNOSIS — Z98.1 S/P FUSION OF THORACIC SPINE: Primary | ICD-10-CM

## 2024-10-30 PROCEDURE — 99024 POSTOP FOLLOW-UP VISIT: CPT | Performed by: PHYSICIAN ASSISTANT

## 2024-10-30 PROCEDURE — 1111F DSCHRG MED/CURRENT MED MERGE: CPT | Performed by: PHYSICIAN ASSISTANT

## 2024-11-01 ENCOUNTER — TELEPHONE (OUTPATIENT)
Dept: NEUROLOGY | Facility: CLINIC | Age: 74
End: 2024-11-01
Payer: MEDICARE

## 2024-11-01 DIAGNOSIS — G95.89 MYELOMALACIA OF CERVICAL CORD: ICD-10-CM

## 2024-11-01 DIAGNOSIS — M47.12 CERVICAL SPONDYLOSIS WITH MYELOPATHY: Primary | ICD-10-CM

## 2024-11-07 PROBLEM — Z98.84 HISTORY OF BARIATRIC SURGERY: Status: RESOLVED | Noted: 2024-05-13 | Resolved: 2024-11-07

## 2024-11-07 PROBLEM — M79.645 PAIN OF LEFT THUMB: Status: RESOLVED | Noted: 2019-10-03 | Resolved: 2024-11-07

## 2024-11-12 ENCOUNTER — OFFICE VISIT (OUTPATIENT)
Facility: CLINIC | Age: 74
End: 2024-11-12
Payer: MEDICARE

## 2024-11-12 VITALS
DIASTOLIC BLOOD PRESSURE: 80 MMHG | HEART RATE: 71 BPM | BODY MASS INDEX: 33.27 KG/M2 | HEIGHT: 67 IN | SYSTOLIC BLOOD PRESSURE: 120 MMHG | TEMPERATURE: 97.2 F | WEIGHT: 212 LBS

## 2024-11-12 DIAGNOSIS — T81.30XA WOUND DEHISCENCE: Primary | ICD-10-CM

## 2024-11-12 RX ORDER — SULFAMETHOXAZOLE AND TRIMETHOPRIM 800; 160 MG/1; MG/1
1 TABLET ORAL 2 TIMES DAILY
Qty: 14 TABLET | Refills: 0 | Status: SHIPPED | OUTPATIENT
Start: 2024-11-12 | End: 2024-11-19

## 2024-11-12 ASSESSMENT — PAIN SCALES - GENERAL: PAINLEVEL_OUTOF10: 3

## 2024-11-12 ASSESSMENT — PATIENT HEALTH QUESTIONNAIRE - PHQ9
SUM OF ALL RESPONSES TO PHQ9 QUESTIONS 1 & 2: 0
2. FEELING DOWN, DEPRESSED OR HOPELESS: NOT AT ALL
1. LITTLE INTEREST OR PLEASURE IN DOING THINGS: NOT AT ALL

## 2024-11-12 NOTE — PROGRESS NOTES
[facility] [place]  Department of Neurological Surgery  Post Operative Patient Visit      History of Present Illness:  Misael Lopez is a 74 y.o. year old male who presents to the spine clinic in a post operative visit for a wound check.  He underwent T9 Transpedicular Decompression, T8-10 Fusion, L3-5 Laminectomy at [facility] on 10/9/24 by Dr. Ching. He was discharged home on 10/12/24. I saw him in a post-operative visit on 10/30/24 and at that time he was doing well but there was some erythema along the incision line for which I recommended he complete a course of Bactrim. He has since completed the course of Bactrim. Denies any fevers, chills or wound drainage at home. Wife reports following his shower today there was a scab the fell off and now there is a small area of dehiscence.   Otherwise, he reports his pain is decreasing and he is continuing to improve after surgery.         Anticoagulation: ASA    On exam:  A&O x 3, speech clear / fluent  Respirations even / unlabored  [place] without any focal deficit  SURGICAL INCISION is: Small < 0.5 cm area of dehiscence. Otherwise healing well. No edema or drainage noted. (Photo uploaded in media)  Gait is steady    Misael Lopez continues to progress well post-operatively, however there is a small area of dehiscence. Will place a referral to wound clinic for further recommendations. Will continue Bactrim for an additional week. He agrees to follow up with Dr. Ching as previously scheduled, 1/6/25, with thoracic spine x-rays prior to visit, or as needed before that time.        The above clinical summary has been dictated with voice recognition software. It has not been proofread for grammatical errors, typographical mistakes, or other semantic inconsistencies.     Thank you for visiting our office today. It was our pleasure to take part in your healthcare.      Do not hesitate to call with any questions regarding your plan of care after leaving at  [phone] M-F 8am-4pm.      To clinicians, thank you very much for this kind referral. It is a privilege to partner with you in the care of your patients. My office would be delighted to assist you with any further consultations or with questions regarding the plan of care outlined. Do not hesitate to call the office or contact me directly.         Sincerely,          [place] [address]     Phone: [phone]  Fax: [phone]

## 2024-11-15 ENCOUNTER — OFFICE VISIT (OUTPATIENT)
Dept: WOUND CARE | Facility: CLINIC | Age: 74
End: 2024-11-15
Payer: MEDICARE

## 2024-11-15 PROCEDURE — 99213 OFFICE O/P EST LOW 20 MIN: CPT

## 2024-11-15 PROCEDURE — 17250 CHEM CAUT OF GRANLTJ TISSUE: CPT

## 2024-11-22 ENCOUNTER — OFFICE VISIT (OUTPATIENT)
Dept: WOUND CARE | Facility: CLINIC | Age: 74
End: 2024-11-22
Payer: MEDICARE

## 2024-11-22 PROCEDURE — 99213 OFFICE O/P EST LOW 20 MIN: CPT

## 2024-11-29 ENCOUNTER — OFFICE VISIT (OUTPATIENT)
Dept: WOUND CARE | Facility: CLINIC | Age: 74
End: 2024-11-29
Payer: MEDICARE

## 2024-12-03 DIAGNOSIS — E78.5 HYPERLIPIDEMIA, UNSPECIFIED: ICD-10-CM

## 2024-12-03 DIAGNOSIS — D64.9 ANEMIA, UNSPECIFIED: Primary | ICD-10-CM

## 2024-12-05 ENCOUNTER — OFFICE VISIT (OUTPATIENT)
Dept: CARDIOLOGY | Facility: CLINIC | Age: 74
End: 2024-12-05
Payer: MEDICARE

## 2024-12-05 ENCOUNTER — LAB (OUTPATIENT)
Dept: LAB | Facility: LAB | Age: 74
End: 2024-12-05
Payer: MEDICARE

## 2024-12-05 VITALS
BODY MASS INDEX: 33.9 KG/M2 | OXYGEN SATURATION: 95 % | HEART RATE: 65 BPM | WEIGHT: 216 LBS | SYSTOLIC BLOOD PRESSURE: 118 MMHG | DIASTOLIC BLOOD PRESSURE: 74 MMHG | HEIGHT: 67 IN

## 2024-12-05 DIAGNOSIS — E66.811 CLASS 1 OBESITY WITHOUT SERIOUS COMORBIDITY WITH BODY MASS INDEX (BMI) OF 33.0 TO 33.9 IN ADULT, UNSPECIFIED OBESITY TYPE: ICD-10-CM

## 2024-12-05 DIAGNOSIS — D64.9 ANEMIA, UNSPECIFIED: ICD-10-CM

## 2024-12-05 DIAGNOSIS — Z98.890 BACK PAIN WITH HISTORY OF SPINAL SURGERY: ICD-10-CM

## 2024-12-05 DIAGNOSIS — E78.5 HYPERLIPIDEMIA, UNSPECIFIED: ICD-10-CM

## 2024-12-05 DIAGNOSIS — N40.1 BENIGN PROSTATIC HYPERPLASIA WITH LOWER URINARY TRACT SYMPTOMS: ICD-10-CM

## 2024-12-05 DIAGNOSIS — I47.29 NSVT (NONSUSTAINED VENTRICULAR TACHYCARDIA) (MULTI): ICD-10-CM

## 2024-12-05 DIAGNOSIS — I25.10 CORONARY ARTERY DISEASE INVOLVING NATIVE CORONARY ARTERY OF NATIVE HEART WITHOUT ANGINA PECTORIS: ICD-10-CM

## 2024-12-05 DIAGNOSIS — Z98.84 S/P GASTRIC BYPASS: ICD-10-CM

## 2024-12-05 DIAGNOSIS — Z95.5 HISTORY OF CORONARY ARTERY STENT PLACEMENT: ICD-10-CM

## 2024-12-05 DIAGNOSIS — I50.32 CHRONIC HEART FAILURE WITH PRESERVED EJECTION FRACTION: ICD-10-CM

## 2024-12-05 DIAGNOSIS — E29.1 TESTICULAR HYPOFUNCTION: Primary | ICD-10-CM

## 2024-12-05 DIAGNOSIS — I10 HYPERTENSION, BENIGN: Primary | ICD-10-CM

## 2024-12-05 DIAGNOSIS — M54.9 BACK PAIN WITH HISTORY OF SPINAL SURGERY: ICD-10-CM

## 2024-12-05 LAB
ATRIAL RATE: 63 BPM
CHOLEST SERPL-MCNC: 132 MG/DL (ref 0–199)
CHOLESTEROL/HDL RATIO: 2.3
HDLC SERPL-MCNC: 57.6 MG/DL
LDLC SERPL CALC-MCNC: 64 MG/DL
NON HDL CHOLESTEROL: 74 MG/DL (ref 0–149)
P AXIS: 53 DEGREES
P OFFSET: 178 MS
P ONSET: 132 MS
PR INTERVAL: 174 MS
PSA SERPL-MCNC: 2.2 NG/ML
Q ONSET: 219 MS
QRS COUNT: 10 BEATS
QRS DURATION: 80 MS
QT INTERVAL: 410 MS
QTC CALCULATION(BAZETT): 419 MS
QTC FREDERICIA: 416 MS
R AXIS: 43 DEGREES
T AXIS: 49 DEGREES
T OFFSET: 424 MS
TESTOST SERPL-MCNC: 338 NG/DL (ref 240–1000)
TRIGL SERPL-MCNC: 51 MG/DL (ref 0–149)
VENTRICULAR RATE: 63 BPM
VLDL: 10 MG/DL (ref 0–40)

## 2024-12-05 PROCEDURE — 99214 OFFICE O/P EST MOD 30 MIN: CPT | Performed by: INTERNAL MEDICINE

## 2024-12-05 PROCEDURE — 1159F MED LIST DOCD IN RCRD: CPT | Performed by: INTERNAL MEDICINE

## 2024-12-05 PROCEDURE — 3008F BODY MASS INDEX DOCD: CPT | Performed by: INTERNAL MEDICINE

## 2024-12-05 PROCEDURE — 84403 ASSAY OF TOTAL TESTOSTERONE: CPT

## 2024-12-05 PROCEDURE — 3074F SYST BP LT 130 MM HG: CPT | Performed by: INTERNAL MEDICINE

## 2024-12-05 PROCEDURE — 80061 LIPID PANEL: CPT

## 2024-12-05 PROCEDURE — 84153 ASSAY OF PSA TOTAL: CPT

## 2024-12-05 PROCEDURE — 3078F DIAST BP <80 MM HG: CPT | Performed by: INTERNAL MEDICINE

## 2024-12-05 PROCEDURE — 93005 ELECTROCARDIOGRAM TRACING: CPT | Performed by: INTERNAL MEDICINE

## 2024-12-05 PROCEDURE — 36415 COLL VENOUS BLD VENIPUNCTURE: CPT

## 2024-12-05 NOTE — PROGRESS NOTES
"  Subjective  Misael Lopez  is a 74 y.o. year old male who presents for ASHD  Blood pressure 118/74, pulse 65, height 1.702 m (5' 7\"), weight 98 kg (216 lb), SpO2 95%.;  Had back surgery 10/9/24.  No chest pain, no dyspnea, no palpitations, no edema.  Nitroglycerin  Past Medical History:   Diagnosis Date    Anxiety     Arthritis     ASHD (arteriosclerotic heart disease)     Coronary artery disease     Depression     GERD (gastroesophageal reflux disease)     HL (hearing loss)     Hyperlipidemia     Hypertension     Lumbar radiculopathy     Myocardial infarction (Multi)     Nephrolithiasis     NSVT (nonsustained ventricular tachycardia) (Multi)     Obesity     S/P gastric bypass    Sleep apnea     Spinal stenosis     Thoracic myelopathy      Past Surgical History:   Procedure Laterality Date    BREAST LUMPECTOMY      BUNIONECTOMY      CARDIAC CATHETERIZATION  11/02/2020    CARPAL TUNNEL RELEASE      CERVICAL SPINE SURGERY  04/15/2024    ACDFP    COLONOSCOPY      GASTRIC BYPASS      KNEE ARTHROSCOPY W/ DEBRIDEMENT      LITHOTRIPSY      Renal Lithotripsy    LUMBAR LAMINECTOMY      NASAL SEPTUM SURGERY      OTHER SURGICAL HISTORY  09/16/2015    Hemilaminectomy    SHOULDER SURGERY      UPPER GASTROINTESTINAL ENDOSCOPY      VASECTOMY       Family History   Problem Relation Name Age of Onset    Coronary artery disease Mother      Coronary artery disease Father       @SOC    Current Outpatient Medications   Medication Sig Dispense Refill    acetaminophen (Tylenol) 325 mg tablet Take 3 tablets (975 mg) by mouth every 8 hours if needed for mild pain (1 - 3) (post op pain).      aspirin 81 mg EC tablet Take 1 tablet (81 mg) by mouth once daily. DO NOT RESUME UNTIL POST OP DAY 7 (10/16/24)      atorvastatin (Lipitor) 20 mg tablet Take 1 tablet (20 mg) by mouth once daily. 90 tablet 3    bromocriptine (Parlodel) 5 mg capsule Take 1 capsule (5 mg) by mouth once daily.      calcium acetate (Phoslo) 667 mg tablet Take 1 tablet " (667 mg) by mouth once daily.      calcium polycarbophiL (FiberCon) 625 mg tablet Take 1 tablet (625 mg) by mouth once daily.      cyanocobalamin (Vitamin B-12) 100 mcg tablet Take 1 tablet (100 mcg) by mouth once daily.      cyclobenzaprine (Flexeril) 10 mg tablet Take 1 tablet (10 mg) by mouth 3 times a day as needed for muscle spasms (post op pain). 30 tablet 1    mirtazapine (Remeron) 15 mg tablet Take 1 tablet (15 mg) by mouth once daily at bedtime.      multivitamin with minerals (multivit-min-iron fum-folic ac) tablet Take 1 tablet by mouth once daily.      pantoprazole (ProtoNix) 40 mg EC tablet Take 1 tablet (40 mg) by mouth once daily in the morning. Take before meals.      PROzac 20 mg capsule Take 3 capsules (60 mg) by mouth once daily.      ranolazine (Ranexa) 500 mg 12 hr tablet Take 1 tablet (500 mg) by mouth every 12 hours. 180 tablet 3    tamsulosin (Flomax) 0.4 mg 24 hr capsule Take 1 capsule (0.4 mg) by mouth 2 times a day.       No current facility-administered medications for this visit.        ROS  Review of Systems   All other systems reviewed and are negative.      Physical Exam  Physical Exam  Constitutional:       Appearance: He is obese.   HENT:      Head: Normocephalic and atraumatic.   Cardiovascular:      Rate and Rhythm: Normal rate and regular rhythm.   Pulmonary:      Effort: Pulmonary effort is normal.      Breath sounds: Normal breath sounds.   Musculoskeletal:      Right lower leg: No edema.      Left lower leg: No edema.   Skin:     General: Skin is warm and dry.   Neurological:      General: No focal deficit present.      Mental Status: He is alert and oriented to person, place, and time.   Psychiatric:         Mood and Affect: Mood normal.         Behavior: Behavior normal.          EKG  Encounter Date: 12/05/24   ECG 12 Lead   Result Value    Ventricular Rate 63    Atrial Rate 63    VT Interval 174    QRS Duration 80    QT Interval 410    QTC Calculation(Bazett) 419    P Axis  53    R Axis 43    T Axis 49    QRS Count 10    Q Onset 219    P Onset 132    P Offset 178    T Offset 424    QTC Fredericia 416    Narrative    Normal sinus rhythm  Normal ECG  When compared with ECG of 28-FEB-2022 09:40,  No significant change was found       Problem List Items Addressed This Visit       Back pain with history of spinal surgery     10/9/24 [facility]         Chronic heart failure with preserved ejection fraction    Coronary artery disease    Relevant Orders    Follow Up In Cardiology    History of coronary artery stent placement    Hypertension, benign - Primary    Relevant Orders    ECG 12 Lead (Completed)    Class 1 obesity with body mass index (BMI) of 33.0 to 33.9 in adult    NSVT (nonsustained ventricular tachycardia) (Multi)    S/P gastric bypass    Relevant Orders    Follow Up In Cardiology         Same meds with EKG 3 months      Davonte Valdez MD

## 2024-12-06 ENCOUNTER — APPOINTMENT (OUTPATIENT)
Dept: WOUND CARE | Facility: CLINIC | Age: 74
End: 2024-12-06
Payer: MEDICARE

## 2024-12-18 ENCOUNTER — TELEPHONE (OUTPATIENT)
Dept: NEUROSURGERY | Facility: CLINIC | Age: 74
End: 2024-12-18
Payer: MEDICARE

## 2024-12-18 DIAGNOSIS — M51.04 INTERVERTEBRAL THORACIC DISC DISORDER WITH MYELOPATHY, THORACIC REGION: ICD-10-CM

## 2024-12-18 DIAGNOSIS — Z98.1 S/P FUSION OF THORACIC SPINE: Primary | ICD-10-CM

## 2024-12-30 ENCOUNTER — APPOINTMENT (OUTPATIENT)
Dept: NEUROSURGERY | Facility: CLINIC | Age: 74
End: 2024-12-30
Payer: MEDICARE

## 2024-12-31 ENCOUNTER — HOSPITAL ENCOUNTER (OUTPATIENT)
Dept: RADIOLOGY | Facility: HOSPITAL | Age: 74
Discharge: HOME | End: 2024-12-31
Payer: MEDICARE

## 2024-12-31 DIAGNOSIS — Z98.1 S/P FUSION OF THORACIC SPINE: ICD-10-CM

## 2024-12-31 DIAGNOSIS — M47.12 CERVICAL SPONDYLOSIS WITH MYELOPATHY: ICD-10-CM

## 2024-12-31 DIAGNOSIS — G95.89 MYELOMALACIA OF CERVICAL CORD: ICD-10-CM

## 2024-12-31 PROCEDURE — 72040 X-RAY EXAM NECK SPINE 2-3 VW: CPT | Performed by: RADIOLOGY

## 2024-12-31 PROCEDURE — 72070 X-RAY EXAM THORAC SPINE 2VWS: CPT

## 2024-12-31 PROCEDURE — 72040 X-RAY EXAM NECK SPINE 2-3 VW: CPT

## 2025-01-06 ENCOUNTER — APPOINTMENT (OUTPATIENT)
Dept: NEUROSURGERY | Facility: CLINIC | Age: 75
End: 2025-01-06
Payer: MEDICARE

## 2025-01-06 VITALS
WEIGHT: 206 LBS | HEART RATE: 65 BPM | SYSTOLIC BLOOD PRESSURE: 124 MMHG | DIASTOLIC BLOOD PRESSURE: 80 MMHG | HEIGHT: 67 IN | BODY MASS INDEX: 32.33 KG/M2

## 2025-01-06 DIAGNOSIS — Z98.1 S/P FUSION OF THORACIC SPINE: Primary | ICD-10-CM

## 2025-01-06 DIAGNOSIS — M48.062 LUMBAR STENOSIS WITH NEUROGENIC CLAUDICATION: Primary | ICD-10-CM

## 2025-01-06 DIAGNOSIS — Z98.1 S/P FUSION OF THORACIC SPINE: ICD-10-CM

## 2025-01-06 PROCEDURE — 1036F TOBACCO NON-USER: CPT | Performed by: STUDENT IN AN ORGANIZED HEALTH CARE EDUCATION/TRAINING PROGRAM

## 2025-01-06 PROCEDURE — 3079F DIAST BP 80-89 MM HG: CPT | Performed by: STUDENT IN AN ORGANIZED HEALTH CARE EDUCATION/TRAINING PROGRAM

## 2025-01-06 PROCEDURE — 1125F AMNT PAIN NOTED PAIN PRSNT: CPT | Performed by: STUDENT IN AN ORGANIZED HEALTH CARE EDUCATION/TRAINING PROGRAM

## 2025-01-06 PROCEDURE — 3074F SYST BP LT 130 MM HG: CPT | Performed by: STUDENT IN AN ORGANIZED HEALTH CARE EDUCATION/TRAINING PROGRAM

## 2025-01-06 PROCEDURE — 3008F BODY MASS INDEX DOCD: CPT | Performed by: STUDENT IN AN ORGANIZED HEALTH CARE EDUCATION/TRAINING PROGRAM

## 2025-01-06 PROCEDURE — 1159F MED LIST DOCD IN RCRD: CPT | Performed by: STUDENT IN AN ORGANIZED HEALTH CARE EDUCATION/TRAINING PROGRAM

## 2025-01-06 PROCEDURE — 99024 POSTOP FOLLOW-UP VISIT: CPT | Performed by: STUDENT IN AN ORGANIZED HEALTH CARE EDUCATION/TRAINING PROGRAM

## 2025-01-06 ASSESSMENT — ENCOUNTER SYMPTOMS
DEPRESSION: 0
OCCASIONAL FEELINGS OF UNSTEADINESS: 1
LOSS OF SENSATION IN FEET: 0

## 2025-01-06 ASSESSMENT — PAIN SCALES - GENERAL: PAINLEVEL_OUTOF10: 4

## 2025-01-06 NOTE — PROGRESS NOTES
Misael Lopez is a 74 y.o. year old male s/p T9 Transpedicular Decompression, T8-10 Fusion, L3-5 Laminectomy at [facility] on 10/9/24     Past Surgical History:   Procedure Laterality Date    BREAST LUMPECTOMY      BUNIONECTOMY      CARDIAC CATHETERIZATION  11/02/2020    CARPAL TUNNEL RELEASE      CERVICAL SPINE SURGERY  04/15/2024    ACDFP    COLONOSCOPY      GASTRIC BYPASS      KNEE ARTHROSCOPY W/ DEBRIDEMENT      LITHOTRIPSY      Renal Lithotripsy    LUMBAR LAMINECTOMY      NASAL SEPTUM SURGERY      OTHER SURGICAL HISTORY  09/16/2015    Hemilaminectomy    SHOULDER SURGERY      UPPER GASTROINTESTINAL ENDOSCOPY      VASECTOMY             Current Outpatient Medications:     acetaminophen (Tylenol) 325 mg tablet, Take 3 tablets (975 mg) by mouth every 8 hours if needed for mild pain (1 - 3) (post op pain)., Disp: , Rfl:     aspirin 81 mg EC tablet, Take 1 tablet (81 mg) by mouth once daily. DO NOT RESUME UNTIL POST OP DAY 7 (10/16/24), Disp: , Rfl:     atorvastatin (Lipitor) 20 mg tablet, Take 1 tablet (20 mg) by mouth once daily., Disp: 90 tablet, Rfl: 3    bromocriptine (Parlodel) 5 mg capsule, Take 1 capsule (5 mg) by mouth once daily., Disp: , Rfl:     calcium acetate (Phoslo) 667 mg tablet, Take 1 tablet (667 mg) by mouth once daily., Disp: , Rfl:     calcium polycarbophiL (FiberCon) 625 mg tablet, Take 1 tablet (625 mg) by mouth once daily., Disp: , Rfl:     cyanocobalamin (Vitamin B-12) 100 mcg tablet, Take 1 tablet (100 mcg) by mouth once daily., Disp: , Rfl:     cyclobenzaprine (Flexeril) 10 mg tablet, Take 1 tablet (10 mg) by mouth 3 times a day as needed for muscle spasms (post op pain)., Disp: 30 tablet, Rfl: 1    mirtazapine (Remeron) 15 mg tablet, Take 1 tablet (15 mg) by mouth once daily at bedtime., Disp: , Rfl:     multivitamin with minerals (multivit-min-iron fum-folic ac) tablet, Take 1 tablet by mouth once daily., Disp: , Rfl:     pantoprazole (ProtoNix) 40 mg EC tablet, Take 1 tablet (40 mg) by mouth  once daily in the morning. Take before meals., Disp: , Rfl:     PROzac 20 mg capsule, Take 3 capsules (60 mg) by mouth once daily., Disp: , Rfl:     ranolazine (Ranexa) 500 mg 12 hr tablet, Take 1 tablet (500 mg) by mouth every 12 hours., Disp: 180 tablet, Rfl: 3    tamsulosin (Flomax) 0.4 mg 24 hr capsule, Take 1 capsule (0.4 mg) by mouth 2 times a day., Disp: , Rfl:       Vitals:    01/06/25 1004   BP: 124/80   Pulse: 65     Objective   Aox3  PERRL, EOMI   5/5 x 4   Incision well healed     Relevant Results    Thoracic upright xrays with hardware in good position    Assessment and Plan:   Misael Lopez is a very nice 74 y.o. year old patient s/p  T9 Transpedicular Decompression, T8-10 Fusion, L3-5 Laminectomy at [facility] on 10/9/24 here for a post-op visit. The patient is doing fairly well. His back pain has resolved post op. He continues to experience unsteady gait but is improving (walker after surgery to no assistive device currently). He will be starting PT this week.     We will see the patient again in 6 months with repeat Xrays.       aRdha Ching MD    of Neurosurgery   [facility] [place]   [facility]   Office: [phone]  Fax: [phone]     Scribe Attestation  By signing my name below, I, Janak Kehinde, Scribe, attest that this documentation has been prepared under the direction and in the presence of Radha Ching MD.

## 2025-01-09 ENCOUNTER — EVALUATION (OUTPATIENT)
Dept: PHYSICAL THERAPY | Facility: CLINIC | Age: 75
End: 2025-01-09
Payer: MEDICARE

## 2025-01-09 DIAGNOSIS — Z98.1 S/P FUSION OF THORACIC SPINE: ICD-10-CM

## 2025-01-09 DIAGNOSIS — M51.04 INTERVERTEBRAL THORACIC DISC DISORDER WITH MYELOPATHY, THORACIC REGION: ICD-10-CM

## 2025-01-09 PROCEDURE — 97110 THERAPEUTIC EXERCISES: CPT | Mod: GP

## 2025-01-09 PROCEDURE — 97161 PT EVAL LOW COMPLEX 20 MIN: CPT | Mod: GP

## 2025-01-09 NOTE — PROGRESS NOTES
Physical Therapy    Physical Therapy Evaluation    Patient Name: Misael Lopez  MRN: [id number]  Today's Date: 1/9/2025  Time Calculation  Start Time: 1630  Stop Time: 1515  Time Calculation (min): 1365 min     Problem List Items Addressed This Visit             ICD-10-CM    Intervertebral thoracic disc disorder with myelopathy, thoracic region M51.04    Relevant Orders    Follow Up In Physical Therapy     Other Visit Diagnoses         Codes    S/P fusion of thoracic spine     Z98.1    Relevant Orders    Follow Up In Physical Therapy            Insurance:  Visit number: 1 [facility]  Insurance Type: Payor: [employer] MEDICARE / Plan: [employer] MEDICARE / Product Type: *No Product type* /   Authorization or Plan of Care date Range:    General:  Reason for visit: s/p T9 Transpedicular Decompression, T8-10 Fusion, L3-5 Laminectomy at [facility] on 10/9/24    Referred by: Dr. Radha Muñoz MD appt:  7/11/25     Precautions:  none  Fall Risk: Low due to recent surgery      Assessment:   Misael is a 73 yo male who presents 3 months s/p T9 decompression, T8-T10 fusion, L3-L5 laminectomy with expected surgical deficits. His exam is significant for decreased ROM, decreased flexibility, impaired posture, some mild weakness, decreased core strength, and decreased balance. He presents with functional deficits with stairs and balance. He is unsure how to self manage his symptoms. He would benefit from skilled PT services to address the above stated deficits and to restore normal balance and function. Skilled PT services to address fear of falling as well.     Clinical Presentation: Stable and/or uncomplicated characteristics    Impairments: Pain, Active ROM, Passive ROM, Strength, Balance, Activity limitations, Fall risk, Flexibility, Motor function/control, Posture, and Decreased functional level    Functional Limitations: Reaching, Lifting, Dressing, Sleep is interrupted., Stair negotiation, and Walking > 15 minutes    Recommended  "Treatment:  Therapeutic exercise, Manual therapy, Gait training, Home program instruction and progression, Neuromuscular re-education, Therapeutic activities, and Self care and home management, modalities PRN       Plan:  Plan of care was developed with input and agreement by the patient.  1 x 8 weeks    Rehab Potential:   Excellent to achieve goals.    Goals:  Patient will demonstrate bilateral SL balance at least 10 seconds to indicate decreased fall risk.   Patient will demonstrate 5x sit<> stand < 12 seconds without use of hands to indicate decreased fall risk.   Patient will demonstrate bilateral hip extension strength at least +/5 to ascend / descend stairs.   Patient will demonstrate ability to ascend / descend stairs with reciprocal gait pattern without use of railing.   Patient will be independent in HEP focused on ROM, strength, flexibility, balance, and overall function for self management of symptoms.   ABC score < 40% to indicate decreased fear of falling.     Subjective:  CC:  Patient reports that he underwent thoracic fusion and lumbar laminectomy due to pain and decreasing function. He underwent elective T8-T10 fusion and L3-L5 laminectomy. Overall is feeling much better. He notes some soreness in his thoracic area. Some occasional tingling in big toes on both his right and left foot, but it only happens \"once in a blue moon.\"  He notes that he still has some balance deficits and some weakness. He does note that he is fearful of falling. He does note some weakness in his left leg compared to right. He notes he is mostly losing his balance when he is turning or on stairs.     NEY:  gradual onset of symptoms;   DOI: 10/9/24  PAIN - Location: thoracic spine Worst(past 24 hours): 4  Least(past 24 hours): 1  Pain Quality: aching, dull, and tightness  PAIN - Alleviating: rest  Aggravating: lifting, reaching, forward bending, standing, walking > 15 minutes, sitting > minutes, and twisting,   MEDICAL " "MANAGEMENT: surgery  PLOF: Chronic pain significantly affecting function  FUNCTIONAL LIMITATIONS:  notes difficulty with climbing stairs, balance difficulty   walking about 15 minutes   LIVING ENVIRONMENT: lives with spouse, 3 stairs to get into home, 10 stairs to loft but has not been up there since surgery  WORK: retired  EXERCISE:  water aerobics   Patient Stated Goal:  more movement    Medical History Form: Reviewed (scanned into chart)    Objective:     Observations: presents in no apparent distress  Incision: well healed thoracic and lumbar incisions    Posture: slight forward head, mild rounded shoulders, increased thoracic kyphosis, decreased lumbar lordosis    Gait: mildly antalgic, with slight wide JULES    Lumbar AROM   Flexion 90  Extension 20  R SB  20   L SB 20    LE MMT  R/L   ??/5  Hip Flexion 5 / 4+   Hip Abduction 4 / 4  Hip Extension 4 / 4  Knee Extension 5 / 5  Knee Flexion 4 / 4  Ankle DF 5 / 5    Flexibility R/L  Upper Trap mod limit / mod limit  Levator mod limit / mod limit  Pec Major / Minor mod limit / mod limit  Hip Flexor mod limit / mod limit  Hamstring mod limit / mod limit     5x sit <> stand : 10.2 seconds, with use of UE   SL Balance R: 5 seconds L: unable     Outcome Measures:  Other Measures  Oswestry Disablity Index (ANCELMO): 4     Modified Falls   ABC: 64%    Treatment Performed: (\"NP\" = Not Performed)     Therapeutic Exercise:     15 minutes  Home exercise program instructed and issued. - below exercises provided for HEP   Postural Band Series - bilateral ER, horz abd/add, D2 flexion   Pec Doorway stretch 3 x 30\"  Tandem stand 3 x 15\"    Manual Therapy:       minutes      Neuromuscular Re-education:      minutes      Gait Training:            minutes      Aquatics:            minutes      Therapeutic Activity:      minutes      Modalities:       Vasopneumatic Device       minutes  Electrical Stimulation          minutes  Ultrasound            minutes  Iontophoresis                    "  minutes  Cold Pack            minutes  Mechanical Traction           minutes      Evaluation Complexity: Low: 30 minutes; Moderate   minutes; Complex PT Evaluation Time Entry: 30;  minutes    Re-Evaluation:   minutes

## 2025-01-14 ENCOUNTER — TREATMENT (OUTPATIENT)
Dept: PHYSICAL THERAPY | Facility: CLINIC | Age: 75
End: 2025-01-14
Payer: MEDICARE

## 2025-01-14 DIAGNOSIS — M51.04 INTERVERTEBRAL THORACIC DISC DISORDER WITH MYELOPATHY, THORACIC REGION: ICD-10-CM

## 2025-01-14 DIAGNOSIS — Z98.1 S/P FUSION OF THORACIC SPINE: ICD-10-CM

## 2025-01-14 PROCEDURE — 97113 AQUATIC THERAPY/EXERCISES: CPT | Mod: GP

## 2025-01-14 NOTE — PROGRESS NOTES
PHYSICAL THERAPY TREATMENT NOTE    Patient Name:  Misael Lopez   Patient MRN: [id number]  Date: 01/14/25  Time Calculation  Start Time: 1050  Stop Time: 1128  Time Calculation (min): 38 min      Insurance:  Visit number: 2 of 20  Insurance Type: [employer]  Authorization or Plan of Care date Range: 1/9/26    Therapy diagnoses:   1. S/P fusion of thoracic spine  Follow Up In Physical Therapy      2. Intervertebral thoracic disc disorder with myelopathy, thoracic region  Follow Up In Physical Therapy           General:  Reason for visit: s/p thoracic spine fusion      Assessment:  Patient is a 74 year old with a diagnosis of s/p thoracic spine fusion.   BASELINES: pain, decreased ROM, decreased strength, decreased balance, poor posture   TREATMENT STRATEGIES: LE strengthening, back strengthening, core strengthening, balance  RESPONSE TO TX: Pt tolerated tx well. No complaints of pain or discomfort with any exercises on this date. Tolerated addition of UE exercises well.     Plan:  Continue to strengthen in aquatic therapy     Precautions:  N/A  Fall Risk: low- post surgical     Subjective:   Patient reports that he was in a neuro study last night and di not get much sleep so he has more discomfort today. Has been feeling good overall.    Pain (0-10): 3   HEP adherence / understanding: going well, no concerns.     Objective:  Antalgic gait       Treatment Performed: * indicates new exercises for HEP     Aquatic:   - walking x 4 laps  - marching alt x 20 ea   - 3 way hip x10 ea B  - squats 2x10  - step ups on red step fwd x 15 B, lateral x 15 B    Purple Dbs-  Rows x 15   Flex/ext x 15  Abd/add x 15   Horizontal abd/add x 15     Deep end-  Hanging x 5 min   Bicycle x 2 min   Scissor x 2 min   Skier x 2 min

## 2025-01-15 ENCOUNTER — APPOINTMENT (OUTPATIENT)
Dept: PHYSICAL THERAPY | Facility: CLINIC | Age: 75
End: 2025-01-15
Payer: MEDICARE

## 2025-01-16 ENCOUNTER — TELEPHONE (OUTPATIENT)
Dept: NEUROSURGERY | Facility: CLINIC | Age: 75
End: 2025-01-16
Payer: MEDICARE

## 2025-01-28 ENCOUNTER — TREATMENT (OUTPATIENT)
Dept: PHYSICAL THERAPY | Facility: CLINIC | Age: 75
End: 2025-01-28
Payer: MEDICARE

## 2025-01-28 DIAGNOSIS — M48.062 LUMBAR STENOSIS WITH NEUROGENIC CLAUDICATION: ICD-10-CM

## 2025-01-28 PROCEDURE — 97113 AQUATIC THERAPY/EXERCISES: CPT | Mod: GP

## 2025-01-28 NOTE — PROGRESS NOTES
PHYSICAL THERAPY TREATMENT NOTE    Patient Name:  Misael Lopez   Patient MRN: [id number]  Date: 01/28/25  Time Calculation  Start Time: 1355  Stop Time: 1440  Time Calculation (min): 45 min      Insurance:  Visit number: 3 of 20  Insurance Type: [employer]  Authorization or Plan of Care date Range: 1/9/26    Therapy diagnoses:   1. Lumbar stenosis with neurogenic claudication  Follow Up In Physical Therapy           General:  Reason for visit: s/p thoracic spine fusion      Assessment:  Patient is a 74 year old with a diagnosis of s/p thoracic spine fusion.   BASELINES: pain, decreased ROM, decreased strength, decreased balance, poor posture   TREATMENT STRATEGIES: LE strengthening, back strengthening, core strengthening, balance  RESPONSE TO TX: Pt tolerated tx well. No complaints of pain or discomfort with any exercises on this date. Feels good in deep end.      Plan:  Continue to strengthen in aquatic therapy     Precautions:  N/A  Fall Risk: low- post surgical     Subjective:   Patient reports that he is sore coming in today. States that he was at [place] the past week and was walking a lot plus standing in lines. Got a scooter which helped some.    Pain (0-10): 3   HEP adherence / understanding: going well, no concerns.     Objective:  Antalgic gait       Treatment Performed: * indicates new exercises for HEP     Aquatic:   - walking x 4 laps  - heel raises/toe raises x 20 ea   - marching alt x 20 ea with small noodle  - 3 way hip x20 ea B with small noodle   - squats 2x10  - step ups on red step fwd x 15 B, lateral x 15 B- NP    Purple Dbs-  Rows x 15   Flex/ext x 15  Abd/add x 15   Horizontal abd/add x 15     Deep end-  Hanging x 5 min   Bicycle x 2 min   Scissor x 2 min   Skier x 2 min   HS curls x 2 min

## 2025-02-06 ENCOUNTER — TREATMENT (OUTPATIENT)
Dept: PHYSICAL THERAPY | Facility: CLINIC | Age: 75
End: 2025-02-06
Payer: MEDICARE

## 2025-02-06 DIAGNOSIS — M48.062 LUMBAR STENOSIS WITH NEUROGENIC CLAUDICATION: Primary | ICD-10-CM

## 2025-02-06 PROCEDURE — 97110 THERAPEUTIC EXERCISES: CPT | Mod: GP

## 2025-02-06 NOTE — PROGRESS NOTES
"PHYSICAL THERAPY TREATMENT NOTE    Patient Name:  Misael Lopez   Patient MRN: [id number]  Date: 02/06/25  Time Calculation  Start Time: 1430  Stop Time: 1515  Time Calculation (min): 45 min      Insurance:  Visit number: 4 of 20  Insurance Type: [employer]  Authorization or Plan of Care date Range: 1/9/26    Therapy diagnoses:   1. Lumbar stenosis with neurogenic claudication               General:  Reason for visit: s/p thoracic spine fusion      Assessment:  Patient is a 74 year old with a diagnosis of s/p thoracic spine fusion.   BASELINES: pain, decreased ROM, decreased strength, decreased balance, poor posture   TREATMENT STRATEGIES: LE strengthening, back strengthening, core strengthening, balance  RESPONSE TO TX: Able to complete all activities on land without difficulty. Fatigued with step-ups. Verbal cues for core activation with supine stabilization activities. Patient perfers to be in pool , and would like to have next visits completed in pool. Continues to have difficulty with completing stairs due to balance.     Plan:  Continue to strengthen in aquatic therapy     Precautions:  N/A  Fall Risk: low- post surgical     Subjective:  Reports that he is feeling ok today. Reports that he is still having a hard time with going up an ddown stairs.   Pain (0-10): 3   HEP adherence / understanding: going well, no concerns.     Objective:  Antalgic gait       Treatment Performed: * indicates new exercises for HEP     Therapeutic Exercise: 40 minutes, 3 units  - NuStep 5 min L2  - Seated: Horz abd / add yellow 2 x 10   - Seated: Bilateral ER yellow 2 x 10   - Seated: D2 flexion yellow 2 x 10 R/L  - Seated: pelvic tilts 5\" x 10   - Seated SKTC 3 x 10\" R/L    - Standing : rows blue 2 x 10   - standing: low rows blue 2 x 10   - standing: anti rotation press yellow 2 x 10   - standing: stir the pot CW/CCW yellow 2 x 10 R/L  - standing: hip flexion, abd, ext 1.5 lbs 2 x 10 R/L    - 6\" lateral step ups 2 x 10 R/L  - 6\" forward " step ups 2 x 10 R/L    Aquatic:  not perofrmed  - walking x 4 laps  - heel raises/toe raises x 20 ea   - marching alt x 20 ea with small noodle  - 3 way hip x20 ea B with small noodle   - squats 2x10  - step ups on red step fwd x 15 B, lateral x 15 B- NP    Purple Dbs-  Rows x 15   Flex/ext x 15  Abd/add x 15   Horizontal abd/add x 15     Deep end-  Hanging x 5 min   Bicycle x 2 min   Scissor x 2 min   Skier x 2 min   HS curls x 2 min

## 2025-02-13 ENCOUNTER — TREATMENT (OUTPATIENT)
Dept: PHYSICAL THERAPY | Facility: CLINIC | Age: 75
End: 2025-02-13
Payer: MEDICARE

## 2025-02-13 DIAGNOSIS — M48.062 LUMBAR STENOSIS WITH NEUROGENIC CLAUDICATION: ICD-10-CM

## 2025-02-13 PROCEDURE — 97113 AQUATIC THERAPY/EXERCISES: CPT | Mod: GP

## 2025-02-14 NOTE — PROGRESS NOTES
"PHYSICAL THERAPY TREATMENT NOTE    Patient Name:  Misael Lopez   Patient MRN: [id number]  Date: 02/13/25  Time Calculation  Start Time: 1430  Stop Time: 1515  Time Calculation (min): 45 min      Insurance:  Visit number: 5 of 20  Insurance Type: A[employer]na  Authorization or Plan of Care date Range: 1/9/26    Therapy diagnoses:   1. Lumbar stenosis with neurogenic claudication  Follow Up In Physical Therapy             General:  Reason for visit: s/p thoracic spine fusion      Assessment:  Patient is a 74 year old with a diagnosis of s/p thoracic spine fusion.   BASELINES: pain, decreased ROM, decreased strength, decreased balance, poor posture   TREATMENT STRATEGIES: LE strengthening, back strengthening, core strengthening, balance,aquatic  RESPONSE TO TX: Patient prefers to complete therapy in pool vs. Land. Progressed exercises with kick board for resisted multidirectional walking. Also able to progress LE strengthening to include multidirectional lunges. Balance was challenged with additional of dumbbell circles. Verbal cues for posture and core engagement throughout session. Continue to progress balance, strength, and gait activities without difficulty.    Plan:  Continue to strengthen in aquatic therapy     Precautions:  N/A  Fall Risk: low- post surgical     Subjective:  Reports that he is feeling ok today. Reports that he is still having a hard time with going up an ddown stairs.   Pain (0-10): 3   HEP adherence / understanding: going well, no concerns.     Objective:  Antalgic gait       Treatment Performed: * indicates new exercises for HEP     Therapeutic Exercise:     Aquatic:   45 minutes, 3 units  - walking fwd / bkwd with kickboard submerged x 4 laps  - heel raises/toe raises x 20 ea   - marching alt x 20 ea with small noodle  - 3 way hip x20 ea B with small noodle   - squats 2x10  - posterior lunge 2x10  - lateral lunge 2x10  - SL balance 3 x 30\"  - SL Balance with blue dumbell CW/CCW 2 x 10 ea   - step ups " on red step fwd x 15 B, lateral x 15 B- NP    Blue Dbs-  Rows x 15   Flex/ext x 15  Abd/add x 15   Horizontal abd/add x 15     Deep end-  Hanging x 5 min   Bicycle x 2 min   Scissor x 2 min   Skier x 2 min   HS curls x 2 min

## 2025-02-17 PROBLEM — F32.A DEPRESSION: Status: RESOLVED | Noted: 2024-01-03 | Resolved: 2025-02-17

## 2025-02-20 ENCOUNTER — TREATMENT (OUTPATIENT)
Dept: PHYSICAL THERAPY | Facility: CLINIC | Age: 75
End: 2025-02-20
Payer: MEDICARE

## 2025-02-20 DIAGNOSIS — Z98.1 S/P FUSION OF THORACIC SPINE: ICD-10-CM

## 2025-02-20 DIAGNOSIS — M51.04 INTERVERTEBRAL THORACIC DISC DISORDER WITH MYELOPATHY, THORACIC REGION: ICD-10-CM

## 2025-02-20 PROCEDURE — 97113 AQUATIC THERAPY/EXERCISES: CPT | Mod: GP

## 2025-02-20 NOTE — PROGRESS NOTES
PHYSICAL THERAPY REASSESSMENT, TREATMENT, DISCHARGE NOTE    Patient Name:  Misael Lopez   Patient MRN: [id number]  Date: 02/20/25  Time Calculation  Start Time: 1435  Stop Time: 1520  Time Calculation (min): 45 min      Insurance:  Visit number: 6 of 20  Insurance Type: Aetna  Authorization or Plan of Care date Range: 1/9/26    Therapy diagnoses:   1. S/P fusion of thoracic spine  Follow Up In Physical Therapy      2. Intervertebral thoracic disc disorder with myelopathy, thoracic region  Follow Up In Physical Therapy             General:  Reason for visit: s/p thoracic spine fusion      Assessment:  Patient is a 74 year old with a diagnosis of s/p thoracic spine fusion.   Misael has been seen for a total of 6 visits with focus on establishing aquatic HEP. Patient prefers to complete exercises in pool. Patient demonstrates overall improvements in his ROM, strength, flexibility, balance, and overall function. Continues to have some remaining balance difficulties which I expect to continue to resolve with time. He is independent in his home program.     Able to initiate prone floats with flutter kick for posterior chain strengthening as well as modified push-up in pool today. No pain with activities, but did fatigue quickly. Progressed HEP to include additional exercises.       Plan:  Discharge to independent HEP.     Patient will demonstrate bilateral SL balance at least 10 seconds to indicate decreased fall risk.   - MET  Patient will demonstrate 5x sit<> stand < 12 seconds without use of hands to indicate decreased fall risk.  - MET   Patient will demonstrate bilateral hip extension strength at least +/5 to ascend / descend stairs.  - MET  Patient will demonstrate ability to ascend / descend stairs with reciprocal gait pattern without use of railing.  - MET  Patient will be independent in HEP focused on ROM, strength, flexibility, balance, and overall function for self management of symptoms.  - MET  ABC score < 40%  "to indicate decreased fear of falling.     Precautions:  N/A  Fall Risk: low- post surgical     Subjective:  Reports that overall his back is feeling pretty good. Reports he is still having some difficulty with going up and down stairs, but feels like that is because of his balance. He notes that he \"has to get his inner ear straight\". He feels confident in his HEP and is ready to be discharged to independent HEP.    Pain (0-10):  .5 / 10   HEP adherence / understanding: going well, no concerns.     Objective:  Observations: presents in no apparent distress  Incision: well healed thoracic and lumbar incisions     Posture: slight forward head, mild rounded shoulders, increased thoracic kyphosis, decreased lumbar lordosis     Gait: mildly antalgic, with slight wide JULES     Lumbar AROM   Flexion 90  --> 100  Extension 20 --> 25  R SB  20  --> 30   L SB 20 --> 30      LE MMT  R/L   ??/5  Hip Flexion 5 / 4+  --> 5/ 5  Hip Abduction 4 / 4 --> 4+  / 4+  Hip Extension 4 / 4 --> 4+ / 4+  Knee Extension 5 / 5 --> 5 / 5  Knee Flexion 4 / 4 --> 4+ / 4+   Ankle DF 5 / 5 --> 5 / 5     Flexibility R/L  Upper Trap mod limit / mod limit  --> min limit / min limit  Levator mod limit / mod limit --> min limit / min limit  Pec Major / Minor mod limit / mod limit --> min limit / min limit   Hip Flexor mod limit / mod limit --> mod limit / mod limit   Hamstring mod limit / mod limit  --> mod limit / mod limit      5x sit <> stand : 10.2 seconds, with use of UE   SL Balance R: 5 seconds L: unable --> R 15 seconds in water / L 15 seconds in water      Outcome Measures:  Other Measures  Oswestry Disablity Index (ANCELMO): 4      Modified Falls   ABC: 64%      Treatment Performed: * indicates new exercises for HEP     Therapeutic Exercise:     Aquatic:   45 minutes, 3 units  - walking fwd / bkwd with kickboard submerged x 4 laps  - heel raises/toe raises x 20 ea   - marching alt x 20 ea with small noodle  - 3 way hip x20 ea B with small noodle " "  - squats 2x10  - posterior lunge 2x10  - lateral lunge 2x10  - SL balance 3 x 30\"  - SL Balance with blue dumbell CW/CCW 2 x 10 ea   - SL balance with running arms x 30\" x 3  - step ups on red step fwd x 15 B, lateral x 15 B- NP  - prone float with flutter kick 2 x 45\"  - modified push-up 3 x 10     Blue Dbs-  Rows x 15   Flex/ext x 15  Abd/add x 15   Horizontal abd/add x 15     Deep end-  Hanging x 5 min   Bicycle x 2 min   Scissor x 2 min   Skier x 2 min   HS curls x 2 min          "

## 2025-03-10 ENCOUNTER — APPOINTMENT (OUTPATIENT)
Dept: CARDIOLOGY | Facility: CLINIC | Age: 75
End: 2025-03-10
Payer: MEDICARE

## 2025-03-13 ENCOUNTER — OFFICE VISIT (OUTPATIENT)
Dept: CARDIOLOGY | Facility: CLINIC | Age: 75
End: 2025-03-13
Payer: MEDICARE

## 2025-03-13 VITALS
WEIGHT: 216.6 LBS | SYSTOLIC BLOOD PRESSURE: 120 MMHG | DIASTOLIC BLOOD PRESSURE: 80 MMHG | BODY MASS INDEX: 34 KG/M2 | HEIGHT: 67 IN | HEART RATE: 78 BPM

## 2025-03-13 DIAGNOSIS — E78.00 PURE HYPERCHOLESTEROLEMIA: ICD-10-CM

## 2025-03-13 DIAGNOSIS — M54.9 BACK PAIN WITH HISTORY OF SPINAL SURGERY: ICD-10-CM

## 2025-03-13 DIAGNOSIS — I50.32 CHRONIC HEART FAILURE WITH PRESERVED EJECTION FRACTION: ICD-10-CM

## 2025-03-13 DIAGNOSIS — Z98.890 BACK PAIN WITH HISTORY OF SPINAL SURGERY: ICD-10-CM

## 2025-03-13 DIAGNOSIS — G47.33 OBSTRUCTIVE SLEEP APNEA SYNDROME: ICD-10-CM

## 2025-03-13 DIAGNOSIS — I10 HYPERTENSION, BENIGN: ICD-10-CM

## 2025-03-13 DIAGNOSIS — I47.29 NSVT (NONSUSTAINED VENTRICULAR TACHYCARDIA) (MULTI): ICD-10-CM

## 2025-03-13 DIAGNOSIS — I25.10 CORONARY ARTERY DISEASE INVOLVING NATIVE CORONARY ARTERY OF NATIVE HEART WITHOUT ANGINA PECTORIS: Primary | ICD-10-CM

## 2025-03-13 DIAGNOSIS — Z95.5 HISTORY OF CORONARY ARTERY STENT PLACEMENT: ICD-10-CM

## 2025-03-13 DIAGNOSIS — Z98.84 S/P GASTRIC BYPASS: ICD-10-CM

## 2025-03-13 LAB
ATRIAL RATE: 76 BPM
P AXIS: 50 DEGREES
P OFFSET: 187 MS
P ONSET: 141 MS
PR INTERVAL: 164 MS
Q ONSET: 223 MS
QRS COUNT: 12 BEATS
QRS DURATION: 76 MS
QT INTERVAL: 372 MS
QTC CALCULATION(BAZETT): 418 MS
QTC FREDERICIA: 402 MS
R AXIS: 42 DEGREES
T AXIS: 49 DEGREES
T OFFSET: 409 MS
VENTRICULAR RATE: 76 BPM

## 2025-03-13 PROCEDURE — 1159F MED LIST DOCD IN RCRD: CPT | Performed by: INTERNAL MEDICINE

## 2025-03-13 PROCEDURE — 3079F DIAST BP 80-89 MM HG: CPT | Performed by: INTERNAL MEDICINE

## 2025-03-13 PROCEDURE — 3074F SYST BP LT 130 MM HG: CPT | Performed by: INTERNAL MEDICINE

## 2025-03-13 PROCEDURE — 1036F TOBACCO NON-USER: CPT | Performed by: INTERNAL MEDICINE

## 2025-03-13 PROCEDURE — 99214 OFFICE O/P EST MOD 30 MIN: CPT | Performed by: INTERNAL MEDICINE

## 2025-03-13 PROCEDURE — 1160F RVW MEDS BY RX/DR IN RCRD: CPT | Performed by: INTERNAL MEDICINE

## 2025-03-13 PROCEDURE — 3008F BODY MASS INDEX DOCD: CPT | Performed by: INTERNAL MEDICINE

## 2025-03-13 PROCEDURE — 93005 ELECTROCARDIOGRAM TRACING: CPT | Performed by: INTERNAL MEDICINE

## 2025-03-13 NOTE — PROGRESS NOTES
"  Subjective  Misael Lopez  is a 74 y.o. year old male who presents for F/U ASHD S/P PCI.  No chest pain, no palpitations, no dyspnea, no edema    Blood pressure 150/80, pulse 78, height 1.702 m (5' 7\"), weight 98.2 kg (216 lb 9.6 oz).   Nitroglycerin  Past Medical History:   Diagnosis Date    Anxiety     Arthritis     ASHD (arteriosclerotic heart disease)     Coronary artery disease     Depression     GERD (gastroesophageal reflux disease)     HL (hearing loss)     Hyperlipidemia     Hypertension     Lumbar radiculopathy     Myocardial infarction (Multi)     Nephrolithiasis     NSVT (nonsustained ventricular tachycardia) (Multi)     Obesity     S/P gastric bypass    Sleep apnea     Spinal stenosis     Thoracic myelopathy      Past Surgical History:   Procedure Laterality Date    BREAST LUMPECTOMY      BUNIONECTOMY      CARDIAC CATHETERIZATION  11/02/2020    CARPAL TUNNEL RELEASE      CERVICAL SPINE SURGERY  04/15/2024    ACDFP    COLONOSCOPY      GASTRIC BYPASS      KNEE ARTHROSCOPY W/ DEBRIDEMENT      LITHOTRIPSY      Renal Lithotripsy    LUMBAR LAMINECTOMY      NASAL SEPTUM SURGERY      OTHER SURGICAL HISTORY  09/16/2015    Hemilaminectomy    SHOULDER SURGERY      UPPER GASTROINTESTINAL ENDOSCOPY      VASECTOMY       Family History   Problem Relation Name Age of Onset    Coronary artery disease Mother      Coronary artery disease Father       @SOC    Current Outpatient Medications   Medication Sig Dispense Refill    acetaminophen (Tylenol) 325 mg tablet Take 3 tablets (975 mg) by mouth every 8 hours if needed for mild pain (1 - 3) (post op pain).      aspirin 81 mg EC tablet Take 1 tablet (81 mg) by mouth once daily. DO NOT RESUME UNTIL POST OP DAY 7 (10/16/24)      atorvastatin (Lipitor) 20 mg tablet Take 1 tablet (20 mg) by mouth once daily. 90 tablet 3    bromocriptine (Parlodel) 5 mg capsule Take 1 capsule (5 mg) by mouth once daily.      calcium acetate (Phoslo) 667 mg tablet Take 1 tablet (667 mg) by mouth " once daily.      calcium polycarbophiL (FiberCon) 625 mg tablet Take 1 tablet (625 mg) by mouth once daily.      cyanocobalamin (Vitamin B-12) 100 mcg tablet Take 1 tablet (100 mcg) by mouth once daily.      cyclobenzaprine (Flexeril) 10 mg tablet Take 1 tablet (10 mg) by mouth 3 times a day as needed for muscle spasms (post op pain). 30 tablet 1    mirtazapine (Remeron) 15 mg tablet Take 1 tablet (15 mg) by mouth once daily at bedtime.      multivitamin with minerals (multivit-min-iron fum-folic ac) tablet Take 1 tablet by mouth once daily.      pantoprazole (ProtoNix) 40 mg EC tablet Take 1 tablet (40 mg) by mouth once daily in the morning. Take before meals.      PROzac 20 mg capsule Take 3 capsules (60 mg) by mouth once daily.      ranolazine (Ranexa) 500 mg 12 hr tablet Take 1 tablet (500 mg) by mouth every 12 hours. 180 tablet 3    tamsulosin (Flomax) 0.4 mg 24 hr capsule Take 1 capsule (0.4 mg) by mouth 2 times a day.       No current facility-administered medications for this visit.        ROS  Review of Systems   All other systems reviewed and are negative.      Physical Exam  Physical Exam  Constitutional:       Appearance: He is obese.   HENT:      Head: Normocephalic and atraumatic.   Cardiovascular:      Rate and Rhythm: Normal rate and regular rhythm.   Pulmonary:      Effort: Pulmonary effort is normal.      Breath sounds: Normal breath sounds.   Abdominal:      Palpations: Abdomen is soft.   Musculoskeletal:      Right lower leg: No edema.      Left lower leg: No edema.   Skin:     General: Skin is warm and dry.   Neurological:      General: No focal deficit present.      Mental Status: He is alert and oriented to person, place, and time.   Psychiatric:         Mood and Affect: Mood normal.         Behavior: Behavior normal.          EKG  Encounter Date: 03/13/25   ECG 12 Lead   Result Value    Ventricular Rate 76    Atrial Rate 76    NM Interval 164    QRS Duration 76    QT Interval 372    QTC  Calculation(Bazett) 418    P Axis 50    R Axis 42    T Axis 49    QRS Count 12    Q Onset 223    P Onset 141    P Offset 187    T Offset 409    QTC Fredericia 402    Narrative    Normal sinus rhythm  Normal ECG  When compared with ECG of 05-DEC-2024 13:43,  No significant change was found       Problem List Items Addressed This Visit       Back pain with history of spinal surgery    Chronic heart failure with preserved ejection fraction    Coronary artery disease - Primary    Relevant Orders    ECG 12 Lead (Completed)    History of coronary artery stent placement    Hyperlipidemia     12/5/24 Tchol = 132, HDL = 57.6, LDL =  74, Trig = 51         Hypertension, benign    NSVT (nonsustained ventricular tachycardia) (Multi)    Obstructive sleep apnea syndrome    S/P gastric bypass     Same meds  Return 3 months with EKG      Davonte Valdez MD

## 2025-03-17 DIAGNOSIS — M47.12 CERVICAL SPONDYLOSIS WITH MYELOPATHY: Primary | ICD-10-CM

## 2025-03-25 ENCOUNTER — APPOINTMENT (OUTPATIENT)
Dept: NEUROSURGERY | Facility: CLINIC | Age: 75
End: 2025-03-25
Payer: MEDICARE

## 2025-03-26 ENCOUNTER — HOSPITAL ENCOUNTER (OUTPATIENT)
Dept: RADIOLOGY | Facility: HOSPITAL | Age: 75
Discharge: HOME | End: 2025-03-26
Payer: MEDICARE

## 2025-03-26 DIAGNOSIS — M47.12 CERVICAL SPONDYLOSIS WITH MYELOPATHY: ICD-10-CM

## 2025-03-26 PROCEDURE — 72040 X-RAY EXAM NECK SPINE 2-3 VW: CPT

## 2025-04-04 ENCOUNTER — APPOINTMENT (OUTPATIENT)
Dept: NEUROSURGERY | Facility: CLINIC | Age: 75
End: 2025-04-04
Payer: MEDICARE

## 2025-04-04 VITALS
RESPIRATION RATE: 16 BRPM | TEMPERATURE: 97.7 F | DIASTOLIC BLOOD PRESSURE: 82 MMHG | HEIGHT: 67 IN | HEART RATE: 68 BPM | WEIGHT: 218.6 LBS | SYSTOLIC BLOOD PRESSURE: 124 MMHG | BODY MASS INDEX: 34.31 KG/M2

## 2025-04-04 DIAGNOSIS — G95.89 MYELOMALACIA OF CERVICAL CORD: Primary | ICD-10-CM

## 2025-04-04 DIAGNOSIS — Z98.1 S/P CERVICAL SPINAL FUSION: ICD-10-CM

## 2025-04-04 DIAGNOSIS — Z98.890 STATUS POST LUMBAR SURGERY: ICD-10-CM

## 2025-04-04 PROCEDURE — 1036F TOBACCO NON-USER: CPT | Performed by: NEUROLOGICAL SURGERY

## 2025-04-04 PROCEDURE — 3079F DIAST BP 80-89 MM HG: CPT | Performed by: NEUROLOGICAL SURGERY

## 2025-04-04 PROCEDURE — 3074F SYST BP LT 130 MM HG: CPT | Performed by: NEUROLOGICAL SURGERY

## 2025-04-04 PROCEDURE — 1159F MED LIST DOCD IN RCRD: CPT | Performed by: NEUROLOGICAL SURGERY

## 2025-04-04 PROCEDURE — 3008F BODY MASS INDEX DOCD: CPT | Performed by: NEUROLOGICAL SURGERY

## 2025-04-04 PROCEDURE — 99214 OFFICE O/P EST MOD 30 MIN: CPT | Performed by: NEUROLOGICAL SURGERY

## 2025-04-04 PROCEDURE — 1125F AMNT PAIN NOTED PAIN PRSNT: CPT | Performed by: NEUROLOGICAL SURGERY

## 2025-04-04 ASSESSMENT — PATIENT HEALTH QUESTIONNAIRE - PHQ9
2. FEELING DOWN, DEPRESSED OR HOPELESS: NOT AT ALL
1. LITTLE INTEREST OR PLEASURE IN DOING THINGS: NOT AT ALL
SUM OF ALL RESPONSES TO PHQ9 QUESTIONS 1 AND 2: 0

## 2025-04-04 ASSESSMENT — PAIN SCALES - GENERAL: PAINLEVEL_OUTOF10: 1

## 2025-04-04 ASSESSMENT — ENCOUNTER SYMPTOMS
DEPRESSION: 0
OCCASIONAL FEELINGS OF UNSTEADINESS: 0
LOSS OF SENSATION IN FEET: 0

## 2025-04-04 NOTE — PROGRESS NOTES
[facility] [place]  Department of Neurological Surgery  Established Patient Visit    History of Present Illness  Misael Lopez is a 74 y.o. year old male who presents to the spine clinic in follow up with his wife to review his last set of x-rays of his cervical spine.  Now 1 year out from his 4 level anterior cervical disc excision with interbody fusion and plate fixation.  He continues to make progress with everything except for his spine motor coordination in his hands.  He is frustrated because he continues to drop objects.  He had significant myelomalacia on the preop studies.  I think it is reasonable at this point to get another MRI of his cervical spine sometime in the next 3 months.  He is scheduled to see my partner Dr. Ching in follow-up after her lumbar operation.  At that visit I hope she can review both the cervical MRI as well as discuss his lumbar condition.    Patient's BMI is Body mass index is 34.24 kg/m².    14/14 systems reviewed and negative other than what is listed in the history of present illness    Patient Active Problem List   Diagnosis    Abnormal stress test    Back pain with history of spinal surgery    Benign prostatic hyperplasia    Bradycardia    Calculus of kidney    Chronic heart failure with preserved ejection fraction    Chronic pain of left knee    Coronary artery disease    Episodic lightheadedness    Fatigue    Gastroesophageal reflux disease without esophagitis    Hearing decreased    Heart block    Heart murmur    History of coronary artery stent placement    Hyperlipidemia    Hypertension, benign    Degeneration of lumbar or lumbosacral intervertebral disc    Lumbosacral spondylosis    Class 1 obesity with body mass index (BMI) of 32.0 to 32.9 in adult    Myalgia    Angina pectoris    Myocardial infarct (Multi)    Near syncope    NSVT (nonsustained ventricular tachycardia) (Multi)    Obstructive sleep apnea syndrome    Osteoarthritis of carpometacarpal  (CMC) joint of left thumb    Pilonidal cyst with abscess    Primary osteoarthritis of left knee    PVC's (premature ventricular contractions)    Severe episode of recurrent major depressive disorder, without psychotic features (Multi)    Thumb joint stiffness    S/P gastric bypass    Myelomalacia of cervical cord    Cervical spondylosis with myelopathy    Lumbar stenosis with neurogenic claudication    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    Paresthesia of upper extremity    S/P cervical spinal fusion    Edema    Intervertebral thoracic disc disorder with myelopathy, thoracic region    Peripheral neuropathy    PTSD (post-traumatic stress disorder)    Status post lumbar surgery     Past Medical History:   Diagnosis Date    Anxiety     Arthritis     ASHD (arteriosclerotic heart disease)     Coronary artery disease     Depression     GERD (gastroesophageal reflux disease)     HL (hearing loss)     Hyperlipidemia     Hypertension     Lumbar radiculopathy     Myocardial infarction (Multi)     Nephrolithiasis     NSVT (nonsustained ventricular tachycardia) (Multi)     Obesity     S/P gastric bypass    Sleep apnea     Spinal stenosis     Thoracic myelopathy      Past Surgical History:   Procedure Laterality Date    BREAST LUMPECTOMY      BUNIONECTOMY      CARDIAC CATHETERIZATION  11/02/2020    CARPAL TUNNEL RELEASE      CERVICAL SPINE SURGERY  04/15/2024    ACDFP    COLONOSCOPY      GASTRIC BYPASS      KNEE ARTHROSCOPY W/ DEBRIDEMENT      LITHOTRIPSY      Renal Lithotripsy    LUMBAR DISC SURGERY      LUMBAR LAMINECTOMY      NASAL SEPTUM SURGERY      OTHER SURGICAL HISTORY  09/16/2015    Hemilaminectomy    SHOULDER SURGERY      THORACIC FUSION      UPPER GASTROINTESTINAL ENDOSCOPY      VASECTOMY       Social History     Tobacco Use    Smoking status: Never     Passive exposure: Never    Smokeless tobacco: Never   Substance Use Topics    Alcohol use: Yes     Comment: socially     family history  includes Coronary artery disease in his father and mother.    Current Outpatient Medications:     acetaminophen (Tylenol) 325 mg tablet, Take 3 tablets (975 mg) by mouth every 8 hours if needed for mild pain (1 - 3) (post op pain)., Disp: , Rfl:     aspirin 81 mg EC tablet, Take 1 tablet (81 mg) by mouth once daily. DO NOT RESUME UNTIL POST OP DAY 7 (10/16/24), Disp: , Rfl:     atorvastatin (Lipitor) 20 mg tablet, Take 1 tablet (20 mg) by mouth once daily., Disp: 90 tablet, Rfl: 3    bromocriptine (Parlodel) 5 mg capsule, Take 1 capsule (5 mg) by mouth once daily., Disp: , Rfl:     calcium acetate (Phoslo) 667 mg tablet, Take 1 tablet (667 mg) by mouth once daily., Disp: , Rfl:     calcium polycarbophiL (FiberCon) 625 mg tablet, Take 1 tablet (625 mg) by mouth once daily., Disp: , Rfl:     cyanocobalamin (Vitamin B-12) 100 mcg tablet, Take 1 tablet (100 mcg) by mouth once daily., Disp: , Rfl:     cyclobenzaprine (Flexeril) 10 mg tablet, Take 1 tablet (10 mg) by mouth 3 times a day as needed for muscle spasms (post op pain)., Disp: 30 tablet, Rfl: 1    mirtazapine (Remeron) 15 mg tablet, Take 1 tablet (15 mg) by mouth once daily at bedtime., Disp: , Rfl:     multivitamin with minerals (multivit-min-iron fum-folic ac) tablet, Take 1 tablet by mouth once daily., Disp: , Rfl:     pantoprazole (ProtoNix) 40 mg EC tablet, Take 1 tablet (40 mg) by mouth once daily in the morning. Take before meals., Disp: , Rfl:     PROzac 20 mg capsule, Take 3 capsules (60 mg) by mouth once daily., Disp: , Rfl:     ranolazine (Ranexa) 500 mg 12 hr tablet, Take 1 tablet (500 mg) by mouth every 12 hours., Disp: 180 tablet, Rfl: 3    tamsulosin (Flomax) 0.4 mg 24 hr capsule, Take 1 capsule (0.4 mg) by mouth 2 times a day., Disp: , Rfl:   Allergies   Allergen Reactions    Nitroglycerin Other     Arrhythmia / Heart Block       Physical Examination:    General: NAD, AOx 3,  no aphasia or dysarthria, normal fund of knowledge  Motor: 5/5  Throughout all extremities,    Sensation: SILT and PP throughout all extremities  His range of motion in his shoulders is significantly greater once prior to surgery.  His handgrip felt strong but he still feels he is dropping things too easily.    Results:  I personally reviewed and interpreted the imaging results which included plain x-rays the top 3 levels look solidly fused and the C6-7 level looks like it has becoming more solid since his December 2024 x-rays.  I will leave it we will go on to complete fusion if it has not already.    Assessment and Plan:      Misael Lopez is a 74 y.o. year old male who presents to the spine clinic in follow up with his wife to review his last set of x-rays of his cervical spine.  Now 1 year out from his 4 level anterior cervical disc excision with interbody fusion and plate fixation.  He continues to make progress with everything except for his spine motor coordination in his hands.  He is frustrated because he continues to drop objects.  He had significant myelomalacia on the preop studies.  I think it is reasonable at this point to get another MRI of his cervical spine sometime in the next 3 months.  He is scheduled to see my partner Dr. Ching in follow-up after her lumbar operation.  At that visit I hope she can review both the cervical MRI as well as discuss his lumbar condition.      I have reviewed all prior documentation and reviewed the electronic medical record since admission. I have personally have reviewed all advanced imaging not just the reports and used my interpretation as documented as the relevant findings. I have reviewed the risks and benefits of all treatment recommendations listed in this note with the patient and family.       The above clinical summary has been dictated with voice recognition software. It has not been proofread for grammatical errors, typographical mistakes, or other semantic inconsistencies.    Thank you for visiting our office today.  It was our pleasure to take part in your healthcare.     Do not hesitate to call with any questions regarding your plan of care after leaving. My office can be reached at [phone] M-F 8am-4pm.     To clinicians, thank you very much for this kind referral. It is a privilege to partner with you in the care of your patients. My office would be delighted to assist you with any further consultations or with questions regarding the plan of care outlined. Do not hesitate to call the office or contact me directly.     Sincerely,    Davonte Mauricio MD, FAANS, FACS  Board Certified Neurological Surgeon  , Department of Neurological Surgery  [facility]    [facility]  [address]  [facility] [address]  [address]  [address]  [address]  [place] [address]    Phone: [phone]  Fax: [phone]

## 2025-05-07 ENCOUNTER — HOSPITAL ENCOUNTER (OUTPATIENT)
Dept: RADIOLOGY | Facility: HOSPITAL | Age: 75
Discharge: HOME | End: 2025-05-07
Payer: MEDICARE

## 2025-05-07 DIAGNOSIS — Z98.1 S/P CERVICAL SPINAL FUSION: ICD-10-CM

## 2025-05-07 DIAGNOSIS — G95.89 MYELOMALACIA OF CERVICAL CORD: ICD-10-CM

## 2025-05-07 PROCEDURE — 72141 MRI NECK SPINE W/O DYE: CPT

## 2025-06-26 ENCOUNTER — APPOINTMENT (OUTPATIENT)
Dept: CARDIOLOGY | Facility: CLINIC | Age: 75
End: 2025-06-26
Payer: MEDICARE

## 2025-07-03 ENCOUNTER — PREP FOR PROCEDURE (OUTPATIENT)
Dept: CARDIOLOGY | Facility: HOSPITAL | Age: 75
End: 2025-07-03
Payer: MEDICARE

## 2025-07-03 DIAGNOSIS — R07.9 CHEST PAIN: Primary | ICD-10-CM

## 2025-07-07 PROBLEM — R07.9 CHEST PAIN: Status: ACTIVE | Noted: 2025-07-03

## 2025-07-11 ENCOUNTER — APPOINTMENT (OUTPATIENT)
Dept: NEUROSURGERY | Facility: CLINIC | Age: 75
End: 2025-07-11
Payer: MEDICARE

## 2025-07-11 VITALS
SYSTOLIC BLOOD PRESSURE: 122 MMHG | HEART RATE: 62 BPM | WEIGHT: 212 LBS | BODY MASS INDEX: 33.27 KG/M2 | DIASTOLIC BLOOD PRESSURE: 84 MMHG | HEIGHT: 67 IN

## 2025-07-11 DIAGNOSIS — Z98.1 S/P FUSION OF THORACIC SPINE: Primary | ICD-10-CM

## 2025-07-11 DIAGNOSIS — Z98.890 STATUS POST LUMBAR SURGERY: ICD-10-CM

## 2025-07-11 DIAGNOSIS — M51.04 HERNIATION OF INTERVERTEBRAL DISC OF THORACIC SPINE WITH MYELOPATHY: Primary | ICD-10-CM

## 2025-07-11 PROCEDURE — 3079F DIAST BP 80-89 MM HG: CPT | Performed by: STUDENT IN AN ORGANIZED HEALTH CARE EDUCATION/TRAINING PROGRAM

## 2025-07-11 PROCEDURE — 99215 OFFICE O/P EST HI 40 MIN: CPT | Performed by: STUDENT IN AN ORGANIZED HEALTH CARE EDUCATION/TRAINING PROGRAM

## 2025-07-11 PROCEDURE — 3008F BODY MASS INDEX DOCD: CPT | Performed by: STUDENT IN AN ORGANIZED HEALTH CARE EDUCATION/TRAINING PROGRAM

## 2025-07-11 PROCEDURE — 1036F TOBACCO NON-USER: CPT | Performed by: STUDENT IN AN ORGANIZED HEALTH CARE EDUCATION/TRAINING PROGRAM

## 2025-07-11 PROCEDURE — 3074F SYST BP LT 130 MM HG: CPT | Performed by: STUDENT IN AN ORGANIZED HEALTH CARE EDUCATION/TRAINING PROGRAM

## 2025-07-11 ASSESSMENT — ENCOUNTER SYMPTOMS
LOSS OF SENSATION IN FEET: 0
OCCASIONAL FEELINGS OF UNSTEADINESS: 1
DEPRESSION: 0

## 2025-07-11 NOTE — PROGRESS NOTES
Misael Lopez is a 74 y.o. year old male s/p T9 Transpedicular Decompression, T8-10 Fusion, L3-5 Laminectomy at [facility] on 10/9/24     Past Surgical History:   Procedure Laterality Date    ANTERIOR CERVICAL DISCECTOMY W/ FUSION  04/15/2024    BREAST LUMPECTOMY      BUNIONECTOMY      CARDIAC CATHETERIZATION  11/02/2020    CARPAL TUNNEL RELEASE      CERVICAL SPINE SURGERY  04/15/2024    ACDFP    COLONOSCOPY      GASTRIC BYPASS      KNEE ARTHROSCOPY W/ DEBRIDEMENT      LAMINECTOMY  2009    LITHOTRIPSY      Renal Lithotripsy    LUMBAR DISC SURGERY      LUMBAR LAMINECTOMY      L3-4-5    NASAL SEPTUM SURGERY      OTHER SURGICAL HISTORY  09/16/2015    Hemilaminectomy    SHOULDER SURGERY      THORACIC FUSION      UPPER GASTROINTESTINAL ENDOSCOPY      VASECTOMY             Current Outpatient Medications:     acetaminophen (Tylenol) 325 mg tablet, Take 3 tablets (975 mg) by mouth every 8 hours if needed for mild pain (1 - 3) (post op pain)., Disp: , Rfl:     aspirin 81 mg EC tablet, Take 1 tablet (81 mg) by mouth once daily. DO NOT RESUME UNTIL POST OP DAY 7 (10/16/24), Disp: , Rfl:     atorvastatin (Lipitor) 20 mg tablet, Take 1 tablet (20 mg) by mouth once daily., Disp: 90 tablet, Rfl: 3    bromocriptine (Parlodel) 5 mg capsule, Take 1 capsule (5 mg) by mouth once daily., Disp: , Rfl:     calcium acetate (Phoslo) 667 mg tablet, Take 1 tablet (667 mg) by mouth once daily., Disp: , Rfl:     calcium polycarbophiL (FiberCon) 625 mg tablet, Take 1 tablet (625 mg) by mouth once daily., Disp: , Rfl:     cyanocobalamin (Vitamin B-12) 100 mcg tablet, Take 1 tablet (100 mcg) by mouth once daily., Disp: , Rfl:     cyclobenzaprine (Flexeril) 10 mg tablet, Take 1 tablet (10 mg) by mouth 3 times a day as needed for muscle spasms (post op pain)., Disp: 30 tablet, Rfl: 1    mirtazapine (Remeron) 15 mg tablet, Take 1 tablet (15 mg) by mouth once daily at bedtime., Disp: , Rfl:     multivitamin with minerals (multivit-min-iron fum-folic ac)  tablet, Take 1 tablet by mouth once daily., Disp: , Rfl:     pantoprazole (ProtoNix) 40 mg EC tablet, Take 1 tablet (40 mg) by mouth once daily in the morning. Take before meals., Disp: , Rfl:     PROzac 20 mg capsule, Take 3 capsules (60 mg) by mouth once daily., Disp: , Rfl:     ranolazine (Ranexa) 500 mg 12 hr tablet, Take 1 tablet (500 mg) by mouth every 12 hours., Disp: 180 tablet, Rfl: 3    tamsulosin (Flomax) 0.4 mg 24 hr capsule, Take 1 capsule (0.4 mg) by mouth 2 times a day., Disp: , Rfl:       Vitals:    07/11/25 1005   BP: 122/84   Pulse: 62       Objective   Aox3  PERRL, EOMI   5/5 x 4   Incision well healed     Relevant Results    Upright xrays were obtained without full view of the hardware, but the inferior portion of hardware appears intact on AP view   MRI C-spine: prior myelomalacia without compression currently    Assessment and Plan:   Misael Lopez is a very nice 74 y.o. year old patient s/p  T9 Transpedicular Decompression, T8-10 Fusion, L3-5 Laminectomy at [facility] on 10/9/24 here for a post-op visit. The patient is doing fairly well. His back pain has resolved post op. He continues to experience unsteady gait but is improving. We discussed that some of his gait may be secondary to the myelomalacia we see at the cervical spine.      We reviewed X-rays and his MRI C-spine together today. On examination he is moving all four limbs with full strength and has intact neurological examination.     We will see the patient again in 8 months with repeat Xrays of the thoracic spine.       Radha Ching MD    of Neurosurgery   [facility] [place]   [facility]   Office: [phone]  Fax: [phone]     Scribe Attestation  By signing my name below, Jyoti BLANCO , Sharmila   attest that this documentation has been prepared under the direction and in the presence of Radha Ching MD.

## 2025-07-17 ENCOUNTER — HOSPITAL ENCOUNTER (OUTPATIENT)
Dept: CARDIOLOGY | Facility: HOSPITAL | Age: 75
Setting detail: OUTPATIENT SURGERY
Discharge: HOME | End: 2025-07-17
Payer: MEDICARE

## 2025-07-17 ENCOUNTER — HOSPITAL ENCOUNTER (OUTPATIENT)
Dept: CARDIOLOGY | Facility: HOSPITAL | Age: 75
Discharge: HOME | End: 2025-07-17
Payer: MEDICARE

## 2025-07-17 ENCOUNTER — HOSPITAL ENCOUNTER (OUTPATIENT)
Facility: HOSPITAL | Age: 75
Setting detail: OUTPATIENT SURGERY
Discharge: HOME | End: 2025-07-17
Attending: INTERNAL MEDICINE | Admitting: INTERNAL MEDICINE
Payer: MEDICARE

## 2025-07-17 VITALS
OXYGEN SATURATION: 98 % | HEART RATE: 51 BPM | RESPIRATION RATE: 16 BRPM | SYSTOLIC BLOOD PRESSURE: 142 MMHG | DIASTOLIC BLOOD PRESSURE: 78 MMHG | WEIGHT: 218.7 LBS | BODY MASS INDEX: 34.33 KG/M2 | HEIGHT: 67 IN | TEMPERATURE: 96.8 F

## 2025-07-17 DIAGNOSIS — Z01.818 PRE-OP TESTING: ICD-10-CM

## 2025-07-17 DIAGNOSIS — I25.10 CORONARY ARTERY DISEASE INVOLVING NATIVE HEART, UNSPECIFIED VESSEL OR LESION TYPE, UNSPECIFIED WHETHER ANGINA PRESENT: ICD-10-CM

## 2025-07-17 DIAGNOSIS — R07.9 CHEST PAIN: Primary | ICD-10-CM

## 2025-07-17 DIAGNOSIS — Z95.820 S/P PERCUTANEOUS TRANSLUMINAL ANGIOPLASTY (PTA) WITH STENT PLACEMENT: ICD-10-CM

## 2025-07-17 DIAGNOSIS — I25.112 ATHEROSCLEROTIC HEART DISEASE OF NATIVE CORONARY ARTERY WITH REFRACTORY ANGINA PECTORIS: ICD-10-CM

## 2025-07-17 LAB
ACT BLD: >397 SEC (ref 83–199)
ANION GAP SERPL CALC-SCNC: 11 MMOL/L (ref 10–20)
APTT PPP: 34 SECONDS (ref 26–36)
BUN SERPL-MCNC: 14 MG/DL (ref 6–23)
CALCIUM SERPL-MCNC: 8.7 MG/DL (ref 8.6–10.3)
CHLORIDE SERPL-SCNC: 107 MMOL/L (ref 98–107)
CO2 SERPL-SCNC: 27 MMOL/L (ref 21–32)
CREAT SERPL-MCNC: 0.99 MG/DL (ref 0.5–1.3)
EGFRCR SERPLBLD CKD-EPI 2021: 80 ML/MIN/1.73M*2
ERYTHROCYTE [DISTWIDTH] IN BLOOD BY AUTOMATED COUNT: 12.8 % (ref 11.5–14.5)
GLUCOSE SERPL-MCNC: 105 MG/DL (ref 74–99)
HCT VFR BLD AUTO: 39.3 % (ref 41–52)
HGB BLD-MCNC: 12.9 G/DL (ref 13.5–17.5)
INR PPP: 1.1 (ref 0.9–1.1)
MCH RBC QN AUTO: 32.7 PG (ref 26–34)
MCHC RBC AUTO-ENTMCNC: 32.8 G/DL (ref 32–36)
MCV RBC AUTO: 100 FL (ref 80–100)
NRBC BLD-RTO: 0 /100 WBCS (ref 0–0)
PLATELET # BLD AUTO: 174 X10*3/UL (ref 150–450)
POTASSIUM SERPL-SCNC: 3.9 MMOL/L (ref 3.5–5.3)
PROTHROMBIN TIME: 11.8 SECONDS (ref 9.8–12.4)
RBC # BLD AUTO: 3.95 X10*6/UL (ref 4.5–5.9)
SODIUM SERPL-SCNC: 141 MMOL/L (ref 136–145)
WBC # BLD AUTO: 5.2 X10*3/UL (ref 4.4–11.3)

## 2025-07-17 PROCEDURE — 7100000010 HC PHASE TWO TIME - EACH INCREMENTAL 1 MINUTE: Performed by: INTERNAL MEDICINE

## 2025-07-17 PROCEDURE — 80048 BASIC METABOLIC PNL TOTAL CA: CPT | Performed by: NURSE PRACTITIONER

## 2025-07-17 PROCEDURE — 7100000009 HC PHASE TWO TIME - INITIAL BASE CHARGE: Performed by: INTERNAL MEDICINE

## 2025-07-17 PROCEDURE — 85730 THROMBOPLASTIN TIME PARTIAL: CPT | Performed by: NURSE PRACTITIONER

## 2025-07-17 PROCEDURE — 99152 MOD SED SAME PHYS/QHP 5/>YRS: CPT | Performed by: INTERNAL MEDICINE

## 2025-07-17 PROCEDURE — 85027 COMPLETE CBC AUTOMATED: CPT | Performed by: NURSE PRACTITIONER

## 2025-07-17 PROCEDURE — 85610 PROTHROMBIN TIME: CPT | Performed by: NURSE PRACTITIONER

## 2025-07-17 PROCEDURE — 2780000003 HC OR 278 NO HCPCS: Performed by: INTERNAL MEDICINE

## 2025-07-17 PROCEDURE — 36415 COLL VENOUS BLD VENIPUNCTURE: CPT | Performed by: NURSE PRACTITIONER

## 2025-07-17 PROCEDURE — 2500000004 HC RX 250 GENERAL PHARMACY W/ HCPCS (ALT 636 FOR OP/ED): Performed by: INTERNAL MEDICINE

## 2025-07-17 PROCEDURE — 96373 THER/PROPH/DIAG INJ IA: CPT | Mod: CCI | Performed by: INTERNAL MEDICINE

## 2025-07-17 PROCEDURE — C1769 GUIDE WIRE: HCPCS | Performed by: INTERNAL MEDICINE

## 2025-07-17 PROCEDURE — 2500000001 HC RX 250 WO HCPCS SELF ADMINISTERED DRUGS (ALT 637 FOR MEDICARE OP): Performed by: NURSE PRACTITIONER

## 2025-07-17 PROCEDURE — 2500000005 HC RX 250 GENERAL PHARMACY W/O HCPCS: Performed by: INTERNAL MEDICINE

## 2025-07-17 PROCEDURE — C9600 PERC DRUG-EL COR STENT SING: HCPCS | Mod: LD | Performed by: INTERNAL MEDICINE

## 2025-07-17 PROCEDURE — 2720000007 HC OR 272 NO HCPCS: Performed by: INTERNAL MEDICINE

## 2025-07-17 PROCEDURE — 93005 ELECTROCARDIOGRAM TRACING: CPT

## 2025-07-17 PROCEDURE — 85347 COAGULATION TIME ACTIVATED: CPT

## 2025-07-17 PROCEDURE — C1887 CATHETER, GUIDING: HCPCS | Performed by: INTERNAL MEDICINE

## 2025-07-17 PROCEDURE — 93458 L HRT ARTERY/VENTRICLE ANGIO: CPT | Mod: 59 | Performed by: INTERNAL MEDICINE

## 2025-07-17 PROCEDURE — C1760 CLOSURE DEV, VASC: HCPCS | Performed by: INTERNAL MEDICINE

## 2025-07-17 PROCEDURE — 2550000001 HC RX 255 CONTRASTS: Performed by: INTERNAL MEDICINE

## 2025-07-17 PROCEDURE — 99153 MOD SED SAME PHYS/QHP EA: CPT | Performed by: INTERNAL MEDICINE

## 2025-07-17 PROCEDURE — 2500000001 HC RX 250 WO HCPCS SELF ADMINISTERED DRUGS (ALT 637 FOR MEDICARE OP): Performed by: INTERNAL MEDICINE

## 2025-07-17 PROCEDURE — C1725 CATH, TRANSLUMIN NON-LASER: HCPCS | Performed by: INTERNAL MEDICINE

## 2025-07-17 PROCEDURE — C1874 STENT, COATED/COV W/DEL SYS: HCPCS | Performed by: INTERNAL MEDICINE

## 2025-07-17 PROCEDURE — 2500000004 HC RX 250 GENERAL PHARMACY W/ HCPCS (ALT 636 FOR OP/ED): Performed by: NURSE PRACTITIONER

## 2025-07-17 PROCEDURE — C1894 INTRO/SHEATH, NON-LASER: HCPCS | Performed by: INTERNAL MEDICINE

## 2025-07-17 DEVICE — STENT ONYXNG30018UX ONYX 3.00X18RX
Type: IMPLANTABLE DEVICE | Site: CORONARY | Status: FUNCTIONAL
Brand: ONYX FRONTIER™

## 2025-07-17 RX ORDER — CLOPIDOGREL BISULFATE 75 MG/1
75 TABLET ORAL DAILY
Qty: 90 TABLET | Refills: 3 | Status: SHIPPED | OUTPATIENT
Start: 2025-07-18 | End: 2026-07-18

## 2025-07-17 RX ORDER — NAPROXEN SODIUM 220 MG/1
81 TABLET, FILM COATED ORAL ONCE
Status: DISCONTINUED | OUTPATIENT
Start: 2025-07-17 | End: 2025-07-17 | Stop reason: HOSPADM

## 2025-07-17 RX ORDER — VERAPAMIL HYDROCHLORIDE 2.5 MG/ML
INJECTION INTRAVENOUS AS NEEDED
Status: DISCONTINUED | OUTPATIENT
Start: 2025-07-17 | End: 2025-07-17 | Stop reason: HOSPADM

## 2025-07-17 RX ORDER — ACETAMINOPHEN 325 MG/1
650 TABLET ORAL EVERY 6 HOURS PRN
Status: DISCONTINUED | OUTPATIENT
Start: 2025-07-17 | End: 2025-07-17 | Stop reason: HOSPADM

## 2025-07-17 RX ORDER — CLOPIDOGREL BISULFATE 75 MG/1
75 TABLET ORAL DAILY
Status: DISCONTINUED | OUTPATIENT
Start: 2025-07-18 | End: 2025-07-17 | Stop reason: HOSPADM

## 2025-07-17 RX ORDER — ATORVASTATIN CALCIUM 40 MG/1
40 TABLET, FILM COATED ORAL DAILY
Qty: 90 TABLET | Refills: 3 | Status: SHIPPED | OUTPATIENT
Start: 2025-07-17 | End: 2026-07-17

## 2025-07-17 RX ORDER — NITROGLYCERIN 5 MG/ML
INJECTION, SOLUTION INTRAVENOUS AS NEEDED
Status: DISCONTINUED | OUTPATIENT
Start: 2025-07-17 | End: 2025-07-17 | Stop reason: HOSPADM

## 2025-07-17 RX ORDER — CLOPIDOGREL BISULFATE 300 MG/1
TABLET, FILM COATED ORAL AS NEEDED
Status: DISCONTINUED | OUTPATIENT
Start: 2025-07-17 | End: 2025-07-17 | Stop reason: HOSPADM

## 2025-07-17 RX ORDER — HEPARIN SODIUM 1000 [USP'U]/ML
INJECTION, SOLUTION INTRAVENOUS; SUBCUTANEOUS AS NEEDED
Status: DISCONTINUED | OUTPATIENT
Start: 2025-07-17 | End: 2025-07-17 | Stop reason: HOSPADM

## 2025-07-17 RX ORDER — MIDAZOLAM HYDROCHLORIDE 1 MG/ML
INJECTION, SOLUTION INTRAMUSCULAR; INTRAVENOUS AS NEEDED
Status: DISCONTINUED | OUTPATIENT
Start: 2025-07-17 | End: 2025-07-17 | Stop reason: HOSPADM

## 2025-07-17 RX ORDER — SODIUM CHLORIDE 9 MG/ML
100 INJECTION, SOLUTION INTRAVENOUS CONTINUOUS
Status: DISCONTINUED | OUTPATIENT
Start: 2025-07-17 | End: 2025-07-17 | Stop reason: HOSPADM

## 2025-07-17 RX ORDER — FENTANYL CITRATE 50 UG/ML
INJECTION, SOLUTION INTRAMUSCULAR; INTRAVENOUS AS NEEDED
Status: DISCONTINUED | OUTPATIENT
Start: 2025-07-17 | End: 2025-07-17 | Stop reason: HOSPADM

## 2025-07-17 RX ORDER — LIDOCAINE HYDROCHLORIDE 20 MG/ML
INJECTION, SOLUTION INFILTRATION; PERINEURAL AS NEEDED
Status: DISCONTINUED | OUTPATIENT
Start: 2025-07-17 | End: 2025-07-17 | Stop reason: HOSPADM

## 2025-07-17 RX ADMIN — ACETAMINOPHEN 650 MG: 325 TABLET, FILM COATED ORAL at 10:08

## 2025-07-17 ASSESSMENT — COLUMBIA-SUICIDE SEVERITY RATING SCALE - C-SSRS
2. HAVE YOU ACTUALLY HAD ANY THOUGHTS OF KILLING YOURSELF?: NO
1. IN THE PAST MONTH, HAVE YOU WISHED YOU WERE DEAD OR WISHED YOU COULD GO TO SLEEP AND NOT WAKE UP?: NO
1. IN THE PAST MONTH, HAVE YOU WISHED YOU WERE DEAD OR WISHED YOU COULD GO TO SLEEP AND NOT WAKE UP?: NO
6. HAVE YOU EVER DONE ANYTHING, STARTED TO DO ANYTHING, OR PREPARED TO DO ANYTHING TO END YOUR LIFE?: NO
6. HAVE YOU EVER DONE ANYTHING, STARTED TO DO ANYTHING, OR PREPARED TO DO ANYTHING TO END YOUR LIFE?: NO
2. HAVE YOU ACTUALLY HAD ANY THOUGHTS OF KILLING YOURSELF?: NO

## 2025-07-17 ASSESSMENT — PAIN SCALES - GENERAL
PAINLEVEL_OUTOF10: 0 - NO PAIN
PAINLEVEL_OUTOF10: 3

## 2025-07-17 NOTE — Clinical Note
Patient ID band present and verified. Patient contact is in waiting room. Contact(s) present: spouse. Contact name: Jess

## 2025-07-17 NOTE — Clinical Note
Angioplasty of the proximal left anterior descending lesion. Inflation 1: Pressure = 18 lindsey; Duration = 10 sec.

## 2025-07-17 NOTE — Clinical Note
Closure device placed in the right radial artery. Site closed by radial compression system. Deployed By: RT Suzi13cc of air in band

## 2025-07-17 NOTE — DISCHARGE INSTRUCTIONS
CARDIAC CATHETERIZATION DISCHARGE INSTRUCTIONS     FOR SUDDEN AND SEVERE CHEST PAIN, SHORTNESS OF BREATH, EXCESSIVE BLEEDING, SIGNS OF STROKE, OR CHANGES IN MENTAL STATUS YOU SHOULD CALL 911 IMMEDIATELY.     If your provider has prescribed aspirin and/or clopidogrel (Plavix), or prasugrel (Effient), or ticagrelor (Brilinta), DO NOT STOP THESE MEDICATIONS for any reason without talking to your cardiologist first. If any of these were prescribed, you must take them every day without missing a single dose. If you are getting low on these medications, contact your provider immediately for a refill.     FOR NEXT 24 HOURS  - Upon discharge, you should return home and rest for the remainder of the day and evening. You do not have to stay on bed rest but should not be very active.  It is recommended a responsible adult be with you for the first 24 hours after the procedure.    - No driving for 24 hours after procedure. Please arrange for someone to drive you home from the hospital today.     - Do not drive, operate machinery, or use power tools for 24 hours after your procedure.     - Do not make any legal decisions for 24 hours after your procedure.     - Do not drink alcoholic beverages for 24 hours after your procedure.    WOUND CARE   *FOR RADIAL (WRIST) ACCESS*  ·      No lifting anything with affected arm, no bending or flexing affected wrist for 24 hours - for example, treat your wrist as if it is sprained.        ·      No lifting greater that 5 pounds with your affected arm for 5 days.  ·      Do not engage in vigorous activities (tennis, golf, bowling, weights) for at least 5 days after the procedure.  ·      Do not submerge the wrist in water for 7 days after the procedure.  ·      You should expect mild tingling in your hand and tenderness at the puncture site for up to 3 days.    - The transparent dressing should be removed from the site 24 hours after the procedure.  Wash the site gently with soap and  water. Rinse well and pat dry. Keep the area clean and dry. You may apply a Band-Aid to the site. Avoid lotions, ointments, or powders until fully healed.     - You may shower the day after your procedure.      - It is normal to notice a small bruise around the puncture site and/or a small grape sized or smaller lump. Any large bruising or large lump warrants a call to the office.     - If bleeding should occur, lay down and apply pressure to the affected area for 10 minutes.  If the bleeding stops notify your physician.  If there is a large amount of bleeding or spurting of blood CALL 911 immediately.  DO NOT drive yourself to the hospital.    - You may experience some tenderness, bruising or minimal inflammation.  If you have any concerns, you may contact the Cath Lab or if any of these symptoms become excessive, contact your cardiologist or go to the emergency room.     OTHER INSTRUCTIONS  - You may take acetaminophen (Tylenol) as directed for discomfort.  If pain is not relieved with acetaminophen (Tylenol), contact your doctor.    - If you notice or experience any of the following, you should notify your doctor or seek medical attention  Chest pain or discomfort  Change in mental status or weakness in extremities.  Dizziness, light headedness, or feeling faint.  Change in the site where the procedure was performed, such as bleeding or an increased area of bruising or swelling.  Tingling, numbness, pain, or coolness in the leg/arm beyond the site where the procedure was performed.  Signs of infection (i.e. shaking chills, temperature > 100 degrees Fahrenheit, warmth, redness) in the leg/arm area where the procedure was performed.  Changes in urination   Bloody or black stools  Vomiting blood  Severe nose bleeds  Any excessive bleeding    - Please follow up with Dr. Sanders in 2-3 weeks, [phone].

## 2025-07-17 NOTE — H&P
History and Physical   Pre Surgical Review (< 30 days)      History & Physical Reviewed    I have reviewed the History and Physical dated:  7/11/25   History and Physical reviewed and relevant findings noted. Patient examined to review pertinent physical  findings.: No significant changes   Home Medications Reviewed: see medication list below   Allergies Reviewed: no changes noted      Home Medications  Current Outpatient Medications   Medication Instructions    acetaminophen (TYLENOL) 975 mg, oral, Every 8 hours PRN    aspirin 81 mg, oral, Daily, DO NOT RESUME UNTIL POST OP DAY 7 (10/16/24)    atorvastatin (LIPITOR) 20 mg, oral, Daily    bromocriptine (PARLODEL) 5 mg, Daily    calcium acetate (Phoslo) 667 mg tablet 1 tablet, Daily    calcium polycarbophiL (FIBERCON) 625 mg, Daily    cyanocobalamin (VITAMIN B-12) 100 mcg, Daily    cyclobenzaprine (FLEXERIL) 10 mg, oral, 3 times daily PRN    mirtazapine (REMERON) 15 mg, Nightly    multivitamin with minerals (multivit-min-iron fum-folic ac) tablet 1 tablet, Daily    pantoprazole (PROTONIX) 40 mg, Daily before breakfast    PROzac 60 mg, Daily    ranolazine (RANEXA) 500 mg, oral, Every 12 hours    tamsulosin (FLOMAX) 0.4 mg, 2 times daily        Allergies  RX Allergies[1]    Physical Exam  Physical Exam  Constitutional:       General: He is not in acute distress.    Cardiovascular:      Rate and Rhythm: Bradycardia present.      Comments: + pulses all extremities.  Pulmonary:      Effort: Pulmonary effort is normal. No respiratory distress.      Comments: Clear bilaterally.  Abdominal:      Comments: + BS.     Skin:     General: Skin is warm and dry.     Neurological:      General: No focal deficit present.      Mental Status: He is alert and oriented to person, place, and time.     Psychiatric:         Mood and Affect: Mood normal.         Behavior: Behavior normal.          Airway/Sedation Assessment     Assessment by anesthesia N/A     ·  Mouth Opening OK  yes      Neck Flexibility OK yes      Loose Teeth No   ·  Oropharyngeal Classification Grade II   ·  ASA PS Classification ASA III - Patient with severe systemic disease       Sedation Plan Moderate     Risks, benefits, and alternatives discussed with patient.     ERAS (Enhanced Recovery After Surgery):  ·  ERAS Patient: No      Consent:   COVID-19 Consent:  ·  COVID-19 Risk Consent Surgeon has reviewed key risks related to the risk of francisco COVID-19 and if they contract COVID-19 what the risks are.     Roxie Montoya, APRN-CNP          [1]   Allergies  Allergen Reactions    Nitroglycerin Other     Arrhythmia / Heart Block

## 2025-07-17 NOTE — Clinical Note
intact, no bleeding and no hematoma. Bed/Stretcher in lowest position, wheels locked, appropriate side rails in place/Call bell, personal items and telephone in reach/Instruct patient to call for assistance before getting out of bed/chair/stretcher/Non-slip footwear applied when patient is off stretcher/[place] to call system/Physically safe environment - no spills, clutter or unnecessary equipment/Purposeful proactive rounding/Room/bathroom lighting operational, light cord in reach

## 2025-07-17 NOTE — NURSING NOTE
Discharge instructions reviewed with patient and family, stent card in folder. Iv access removed. Site stable with no bleeding or hematoma observed. No complaint of nausea or pain. Belongings returned. VS stable for transport.

## 2025-07-17 NOTE — POST-PROCEDURE NOTE
Physician Transition of Care Summary  Invasive Cardiovascular Lab    Procedure Date: 7/17/2025  Attending:    * Aung Sanders - Primary  Resident/Fellow/Other Assistant: Surgeons and Role:  * No surgeons found with a matching role *    Indications:   Pre-op Diagnosis      * Chest pain [R07.9]    Post-procedure diagnosis:   Post-op Diagnosis     * Chest pain [R07.9]    Procedure(s):   [facility], With LV  93268 - CT CATH PLMT L HRT & ARTS W/NJX & ANGIO IMG S&I    PCI Angioplasty  22457 - CT PRQ TRLUML CORONARY ANGIOPLASTY ONE ART/BRANCH        Procedure Findings:   Left Main: No significant disease  LAD: Large vessel.  D1 proximal 70% stenosis.  Mid LAD focal 80% stenosis.  LCx: Small vessel.  No significant disease.  RCA: Large dominant vessel.  Stents in mid and distal RCA widely patent with excellent distal runoff.    Description of the Procedure:   PCI with stent to mid LAD 80% - 0%.  3.0 x 18 mm Tony, VENANCIO.  Postdilated 3.5 x 12 mm NC to 18 lindsey.    Complications:   None    Stents/Implants:   Implants          Stent          Stent, [place] Venancio, 3.00 X 18rx - [id number] - Implanted        Inventory item: STENT, TONY FRONTIER VENANCIO, 3.00 X 18RX Model/Cat number: [id number]    : [employer] Lot number: [id number]    Device identifier: [id number]        GUDID Information       Request status Successful        Brand name: [place]™ Version/Model: [id number]    Company name: [employer] MRI safety info as of 7/17/25: MR Conditional    Contains dry or latex rubber: No      GMDN P.T. name: Drug-eluting coronary artery stent, non-bioabsorbable-polymer-coated                As of 7/17/2025       Status: Implanted                              Anticoagulation/Antiplatelet Plan:   IV heparin  Aspirin, Plavix    Estimated Blood Loss:   5 mL    Anesthesia: Moderate Sedation Anesthesia Staff: No anesthesia staff entered.    Any Specimen(s) Removed:   Order Name Source Comment Collection Info Order  Time   COAGULATION SCREEN Blood, Venous  Collected By: Roseann Vazquez RN 7/17/2025  7:46 AM     [facility]   Immediate        BASIC METABOLIC PANEL Blood, Venous  Collected By: Roseann Vazquez RN 7/17/2025  7:46 AM     [facility]   Immediate        CBC Blood, Venous  Collected By: Roseann Vazquez RN 7/17/2025  7:46 AM     [facility]   Immediate            Disposition:   Same-day discharge      Electronically signed by: Aung Sanders MD, 7/17/2025 8:51 AM

## 2025-07-17 NOTE — Clinical Note
Angioplasty of the proximal left anterior descending lesion. Inflation 1: Pressure = 16 lindsey; Duration = 8 sec.

## 2025-07-22 ENCOUNTER — TELEPHONE (OUTPATIENT)
Dept: CARDIAC REHAB | Facility: CLINIC | Age: 75
End: 2025-07-22
Payer: MEDICARE

## 2025-07-22 NOTE — TELEPHONE ENCOUNTER
Misael Lopez  07/22/25    Called patient to schedule initial assessment appointment for cardiac rehab at [facility]. Left a voicemail to call our office to get scheduled upon earliest convenience at [phone].    Micky Barkley RN

## 2025-07-23 LAB
ATRIAL RATE: 51 BPM
ATRIAL RATE: 65 BPM
P AXIS: 63 DEGREES
P AXIS: 66 DEGREES
P OFFSET: 187 MS
P OFFSET: 189 MS
P ONSET: 128 MS
P ONSET: 130 MS
PR INTERVAL: 176 MS
PR INTERVAL: 176 MS
Q ONSET: 216 MS
Q ONSET: 218 MS
QRS COUNT: 10 BEATS
QRS COUNT: 8 BEATS
QRS DURATION: 82 MS
QRS DURATION: 92 MS
QT INTERVAL: 410 MS
QT INTERVAL: 436 MS
QTC CALCULATION(BAZETT): 401 MS
QTC CALCULATION(BAZETT): 426 MS
QTC FREDERICIA: 413 MS
QTC FREDERICIA: 420 MS
R AXIS: 60 DEGREES
R AXIS: 61 DEGREES
T AXIS: 27 DEGREES
T AXIS: 39 DEGREES
T OFFSET: 423 MS
T OFFSET: 434 MS
VENTRICULAR RATE: 51 BPM
VENTRICULAR RATE: 65 BPM

## 2025-07-24 ENCOUNTER — CLINICAL SUPPORT (OUTPATIENT)
Dept: CARDIAC REHAB | Facility: HOSPITAL | Age: 75
End: 2025-07-24
Payer: MEDICARE

## 2025-07-24 ENCOUNTER — APPOINTMENT (OUTPATIENT)
Dept: CARDIAC REHAB | Facility: HOSPITAL | Age: 75
End: 2025-07-24
Payer: MEDICARE

## 2025-07-24 VITALS — BODY MASS INDEX: 34.21 KG/M2 | HEIGHT: 67 IN | WEIGHT: 218 LBS

## 2025-07-24 DIAGNOSIS — Z95.5 STENTED CORONARY ARTERY: ICD-10-CM

## 2025-07-24 ASSESSMENT — DUKE ACTIVITY SCORE INDEX (DASI)
CAN YOU WALK A BLOCK OR TWO ON LEVEL GROUND: YES
CAN YOU DO HEAVY WORK AROUND THE HOUSE LIKE SCRUBBING FLOORS OR LIFTING AND MOVING HEAVY FURNITURE: NO
CAN YOU PARTICIPATE IN STRENOUS SPORTS LIKE SWIMMING, SINGLES TENNIS, FOOTBALL, BASKETBALL, OR SKIING: NO
CAN YOU HAVE SEXUAL RELATIONS: NO
CAN YOU TAKE CARE OF YOURSELF (EAT, DRESS, BATHE, OR USE TOILET): YES
CAN YOU DO YARD WORK LIKE RAKING LEAVES, WEEDING OR PUSHING A MOWER: NO
CAN YOU RUN A SHORT DISTANCE: NO
CAN YOU PARTICIPATE IN MODERATE RECREATIONAL ACTIVITIES LIKE GOLF, BOWLING, DANCING, DOUBLES TENNIS OR THROWING A BASEBALL OR FOOTBALL: NO
CAN YOU WALK INDOORS, SUCH AS AROUND YOUR HOUSE: YES
CAN YOU CLIMB A FLIGHT OF STAIRS OR WALK UP A HILL: NO
DASI METS SCORE: 4.4
TOTAL_SCORE: 13.45
CAN YOU DO LIGHT WORK AROUND THE HOUSE LIKE DUSTING OR WASHING DISHES: YES
CAN YOU DO MODERATE WORK AROUND THE HOUSE LIKE VACUUMING, SWEEPING FLOORS OR CARRYING GROCERIES: YES

## 2025-07-24 NOTE — PROGRESS NOTES
Cardiac Rehabilitation Individual Treatment Plan  -   Initial Treatment Plan    Name: Misael Lopez  Medical Record Number: [id number]  YOB: 1950  Age: 75 y.o.    Today’s Date: 7/24/2025  Primary Care Physician: Jeronimo Gibbs MD  Referring Physician: Dr Aung Sanders MD (Davonte Valdez MD - as Medical Director)  Program Location: [facility] CARDIAC REHAB     General  Primary Diagnosis: PCI   Onset/Date of Diagnosis: 7/17/2025     CR/OR/VR Session # attended:   Initial Assessment, not yet started program.    AACVPR Risk Stratification: Low     Falls Risk: Low    Psychosocial Assessment     Pre PH-Q 9 survey score: Survey given. Pending completion and return from patient.  Post PH-Q 9 survey score: To be done at discharge.    Sent PH-Q 9 to MD if score > 20: Survey not yet completed.    Pt reported/currently experiencing stress: Yes  Patient uses stress management skills: No   History of: Anxiety & Depression  Currently seeing a mental health provider: Yes, Then provider name:   Dr Mode Joseph MD (Currently following with Nurse Practitioner in the office)  Social Support: Yes, Whom:  Wife & Family  Quality of Life Survey: SF-36   SF-36 Pre Post   Physical Functioning Score TBD TBD   Role Limits-Physical Score TBD TBD   General Health Score TBD TBD     Knowledge Assessment/Survey  Pre survey score: Survey given. Pending completion and return from patient.  Post survey score: To be done at discharge.    Learning Assessment:  Learning assessment/barriers: None, Denies  Preferred learning method: Auditory, Reading handout, and Writing handout  Barriers: None, Denies  Comments: Readiness to Learn:  Ready and Willing to learn    Stages of Change:Preparation    Psychosocial Plan    Goal Status: Initial Assessment; goals not yet started    Psychosocial Goals:   Maintain or improve PHQ-9 Survey score by completion of program.  Maintain or improve Post SF-36 Quality of Life Survey by completion of program.   "    Psychosocial Interventions/Education:   TO BE COMPLETED DURING CARDIAC REHAB PROGRAM     AMB CR/MI/VR Psychosocial  -  Initial Assessment: Program not yet started      Nutrition Assessment:    Hyperlipidemia: Yes     Lipids:   Lab Results   Component Value Date    CHOL 132 12/05/2024    HDL 57.6 12/05/2024    LDLF 27 03/31/2023    TRIG 51 12/05/2024       Current Dietary Guidelines: Pt states he is following a No Added Salt diet  Barriers to dietary change: no, Denies    Diet Habit Survey: [place] Dietary Assessment.  Pre survey score: Initial survey given. Pending completion and return from patient.  Post survey score: To be done at discharge.    Diabetes Assessment    Lab Results   Component Value Date    HGBA1C 5.6 09/26/2024       History of Diabetes: No    Weight Management  (Stated per patient during initial phone interview 7/24/2025)  Height: 170.2 cm (5' 7\")  Weight: 98.9 kg (218 lb)  BMI (Calculated): 34.14    PRE Wt:   TBD 1st day                PRE BMI:     TBD  1st day  PRE Waist Cir:  TBD upon completion first session      POST  Wt:    TBD               POST  BMI:  TBD  POST Waist Cir:  To be done at discharge        Nutrition Plan    Goal Status: Initial Assessment; goals not yet started    Nutrition Goals:   Achieve a score of 80% or greater on Post [place] Dietary Assessment.  Attend at least 50% or more of Education lectures provided by completion of program.  Meet with dietitian for Outpatient Nutrition Therapy by completion of program.    Nutrition Interventions/Education:   AHA handout \"What is Angina\"? And \"What is Cardiac Rehabilitation\"?  Lipid profile reviewed with patient during interview.  Provide patient referral for Outpatient Nutrition Therapy on first day.     AMB CR/MI/VR Nutrition  -  Initial Assessment: Program not yet started      Exercise Assessment    Current Home Exercise: None prior to program start  Mode: SEDENTARY  Frequency: 0 Days/week  Duration: 0 " min/day    Exercise Prescription     Exercise Prescription based on: [place] Activity Status Index (DASI) and Health History    DASI Score: 13.45   MET Score: 4.4  (Score divided by 2)     Frequency:  3 days/week   Mode: Treadmill, NuStep /NuStep (T), Airdyne, Arm Ergometer, Recumbent Bike   Duration: 30-45 total aerobic minutes   Intensity: RPE 11-15  Target HR:    MET Level: 2.2 max starting  Patient wears supplemental O2: No    SpO2 Range: 100% to 91 %     Modality Workload METs Duration (minutes)   1 Pre-Exercise /Rest -- -- 5 :00   2   NuStep 4000 34 khalil, load 2  2.1 10 :00   3   NuStep T5 14 khalil, load 2  2.1 10 :00   4   Treadmill Walk 1.0 mph, 0% incline  1.8 10 :00   5   Airdyne 0.2 load, 10-15 Rpm  1.3 10 :00   6   Arm Ergometer 14 khalil, level 1 1.7 10 :00   7   Recumbent Bike 1 Load, 45 Rpm 1.3 10 :00   8   Post-Exercise/ Rest -- -- 5 :00     Resistance Training: No   Home Exercise Prescription given: To be given prior to discharge from program.    Exercise Plan    Goal Status: Initial Assessment; goals not yet started    Exercise Goals:   150 min/week of moderate intensity aerobic exercise by completion of program  Exercise Frequency 5-7 days a week by completion of program.     Exercise Progression:    TO INCREASE MET LEVEL BY 5-10% EACH WEEK  TO INCREASE TOTAL EXERCISE DURATION TO 30-45 MINS    Exercise Interventions/Education:   TO BE COMPLETED DURING CARDIAC REHAB PROGRAM    [facility] AMB CR/MD/VR Exercise  -  Initial Assessment: Program not yet started      Other Core Components/Risk Factor Assessment:    Medication adherence    Current Medications:   Acetaminophen 975mg every 8 hrs as needed (General pain), Aspirin (Baby) 81mg Daily, Atorvastatin 40mg Daily, Bromocriptine (Parlodel) 5mg Daily, Calcium Acetate 667mg Daily, Calcium Polycarbophil (Fibercon) 625mg Daily, Cyanocobalamin (Vit B12) 100mcg Daily, Mirtazapine (Remeron) 15mg Daily, MVI with minerals OTC 1 tab Twice a Day, Pantoprazole (Protonix)  40mg Daily, Prozac 60mg Daily, Ranalozine (Ranexa) 40mg Daily, Tamsulosin (Flomax) 0.4mg Daily, Clopidogrel (Plavix) 75mg Daily     Allergies:  Nitroglycerin : Arrhythmia/Heart Block   ** Patient states Dr Sanders has given Nitroglycerin and he does NOT have allergy. (Aware to discuss with physician for removal from chart)                Medication compliance: Yes   Uses pill box/organizer: Yes    Carries medication list: Yes     Blood Pressure Management  History of Hypertension: Yes   Medication Changes: No   Resting BP:  There were no vitals taken for this visit.   (BP noted 133/64 in prior progress note/ in chart)    Heart Failure Management  Hx of Heart Failure: No    Smoking/Tobacco Assessment  Tobacco Use History[1]  NEVER smoker (Verified with patient 7/24/25)  Other forms of tobacco:  No, Denies          Anyone in the home smoke: No, Denies    Other Core Component Plan    Goal Status: Initial Assessment; goals not yet started    Other Core Component Goals:   Medication compliance established during initial phone interview.  Pt carries updated medication list with them at all times  Resting BP <130/80 by completion of program    Other Core Component Interventions/Education:   TO BE COMPLETED DURING CARDIAC REHAB PROGRAM    [facility] AMB CR/[place]/VR Other Core Component  -  Initial Assessment: Program not yet started      Individual Patient Goals:    Establish exercise routine 3-5 days/week, 30-60 min/day by completion of program.  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  Goal Status: Initial Assessment; goals not yet started    Meet with Dietitian for Out Patient Nutrition therapy by completion of program.  Goal Status: Initial Assessment; goals not yet started      Staff Comments:  Patient to begin cardiac rehab program.    Rehab Staff Signature: Maddy Fair RN    PHYSICIAN REVIEW AND  RESPONSE:  Please check appropriate boxes and sign     __X_ The participant may enroll in the Cardiac Rehabilitation Program with the above individualized treatment plan (ITP)  ____ Defer exercise guidelines and outcomes per department policy and protocol  ____ Make the following changes to the ITP/Exercise Prescription:         [1]   Social History  Tobacco Use   Smoking Status Never    Passive exposure: Never   Smokeless Tobacco Never

## 2025-08-11 ENCOUNTER — APPOINTMENT (OUTPATIENT)
Dept: CARDIAC REHAB | Facility: HOSPITAL | Age: 75
End: 2025-08-11
Payer: MEDICARE

## 2025-08-11 VITALS
DIASTOLIC BLOOD PRESSURE: 64 MMHG | OXYGEN SATURATION: 96 % | SYSTOLIC BLOOD PRESSURE: 113 MMHG | WEIGHT: 218.6 LBS | HEIGHT: 67 IN | BODY MASS INDEX: 34.31 KG/M2

## 2025-08-11 DIAGNOSIS — Z95.5 STENTED CORONARY ARTERY: ICD-10-CM

## 2025-08-11 PROCEDURE — 93798 PHYS/QHP OP CAR RHAB W/ECG: CPT | Performed by: INTERNAL MEDICINE

## 2025-08-13 ENCOUNTER — CLINICAL SUPPORT (OUTPATIENT)
Dept: CARDIAC REHAB | Facility: HOSPITAL | Age: 75
End: 2025-08-13
Payer: MEDICARE

## 2025-08-13 DIAGNOSIS — Z95.5 STENTED CORONARY ARTERY: ICD-10-CM

## 2025-08-13 DIAGNOSIS — E78.2 MIXED HYPERLIPIDEMIA: ICD-10-CM

## 2025-08-13 PROCEDURE — 93798 PHYS/QHP OP CAR RHAB W/ECG: CPT | Performed by: INTERNAL MEDICINE

## 2025-08-15 ENCOUNTER — CLINICAL SUPPORT (OUTPATIENT)
Dept: CARDIAC REHAB | Facility: HOSPITAL | Age: 75
End: 2025-08-15
Payer: MEDICARE

## 2025-08-15 DIAGNOSIS — Z95.5 STENTED CORONARY ARTERY: ICD-10-CM

## 2025-08-15 PROCEDURE — 93798 PHYS/QHP OP CAR RHAB W/ECG: CPT | Performed by: INTERNAL MEDICINE

## 2025-08-18 ENCOUNTER — CLINICAL SUPPORT (OUTPATIENT)
Dept: CARDIAC REHAB | Facility: HOSPITAL | Age: 75
End: 2025-08-18
Payer: MEDICARE

## 2025-08-18 DIAGNOSIS — Z95.5 STENTED CORONARY ARTERY: ICD-10-CM

## 2025-08-18 PROCEDURE — 93798 PHYS/QHP OP CAR RHAB W/ECG: CPT | Performed by: INTERNAL MEDICINE

## 2025-08-19 PROCEDURE — 93005 ELECTROCARDIOGRAM TRACING: CPT

## 2025-08-20 ENCOUNTER — CLINICAL SUPPORT (OUTPATIENT)
Dept: CARDIAC REHAB | Facility: HOSPITAL | Age: 75
End: 2025-08-20
Payer: MEDICARE

## 2025-08-20 DIAGNOSIS — Z95.5 STENTED CORONARY ARTERY: ICD-10-CM

## 2025-08-20 PROCEDURE — 93798 PHYS/QHP OP CAR RHAB W/ECG: CPT | Performed by: INTERNAL MEDICINE

## 2025-08-21 LAB
ATRIAL RATE: 57 BPM
P AXIS: 47 DEGREES
P OFFSET: 175 MS
P ONSET: 129 MS
PR INTERVAL: 178 MS
Q ONSET: 218 MS
QRS COUNT: 9 BEATS
QRS DURATION: 82 MS
QT INTERVAL: 444 MS
QTC CALCULATION(BAZETT): 432 MS
QTC FREDERICIA: 436 MS
R AXIS: 51 DEGREES
T AXIS: 24 DEGREES
T OFFSET: 440 MS
VENTRICULAR RATE: 57 BPM

## 2025-08-22 ENCOUNTER — CLINICAL SUPPORT (OUTPATIENT)
Dept: CARDIAC REHAB | Facility: HOSPITAL | Age: 75
End: 2025-08-22
Payer: MEDICARE

## 2025-08-22 DIAGNOSIS — Z95.5 STENTED CORONARY ARTERY: ICD-10-CM

## 2025-08-22 PROCEDURE — 93798 PHYS/QHP OP CAR RHAB W/ECG: CPT | Performed by: INTERNAL MEDICINE

## 2025-08-25 ENCOUNTER — APPOINTMENT (OUTPATIENT)
Dept: CARDIAC REHAB | Facility: HOSPITAL | Age: 75
End: 2025-08-25
Payer: MEDICARE

## 2025-08-27 ENCOUNTER — APPOINTMENT (OUTPATIENT)
Dept: CARDIAC REHAB | Facility: HOSPITAL | Age: 75
End: 2025-08-27
Payer: MEDICARE

## 2025-08-29 ENCOUNTER — APPOINTMENT (OUTPATIENT)
Dept: CARDIAC REHAB | Facility: HOSPITAL | Age: 75
End: 2025-08-29
Payer: MEDICARE

## 2025-09-03 ENCOUNTER — CLINICAL SUPPORT (OUTPATIENT)
Dept: CARDIAC REHAB | Facility: HOSPITAL | Age: 75
End: 2025-09-03
Payer: MEDICARE

## 2025-09-03 DIAGNOSIS — Z95.5 STENTED CORONARY ARTERY: ICD-10-CM

## 2025-09-03 PROCEDURE — 93798 PHYS/QHP OP CAR RHAB W/ECG: CPT | Performed by: INTERNAL MEDICINE

## 2025-09-05 ENCOUNTER — CLINICAL SUPPORT (OUTPATIENT)
Dept: CARDIAC REHAB | Facility: HOSPITAL | Age: 75
End: 2025-09-05
Payer: MEDICARE

## 2025-09-05 DIAGNOSIS — Z95.5 STENTED CORONARY ARTERY: ICD-10-CM

## 2025-09-05 PROCEDURE — 93798 PHYS/QHP OP CAR RHAB W/ECG: CPT | Performed by: INTERNAL MEDICINE

## 2025-11-05 ENCOUNTER — APPOINTMENT (OUTPATIENT)
Dept: CARDIAC REHAB | Facility: HOSPITAL | Age: 75
End: 2025-11-05
Payer: MEDICARE

## 2025-11-07 ENCOUNTER — APPOINTMENT (OUTPATIENT)
Dept: CARDIAC REHAB | Facility: HOSPITAL | Age: 75
End: 2025-11-07
Payer: MEDICARE

## 2025-11-10 ENCOUNTER — APPOINTMENT (OUTPATIENT)
Dept: CARDIAC REHAB | Facility: HOSPITAL | Age: 75
End: 2025-11-10
Payer: MEDICARE

## 2026-04-10 ENCOUNTER — APPOINTMENT (OUTPATIENT)
Dept: NEUROSURGERY | Facility: CLINIC | Age: 76
End: 2026-04-10
Payer: MEDICARE

## (undated) DEVICE — SPONGE, LAP, XRAY DECT, SC+RFID, 12X12, STERILE

## (undated) DEVICE — MARKER, SKIN, RULER AND LABEL PACK, CUSTOM

## (undated) DEVICE — NEEDLE, HYPODERMIC, SAFETYGLIDE, SHIELDING, REGULAR WALL, REGULAR BEVEL, 22 G X 1.5 IN, BLACK HUB

## (undated) DEVICE — SEALANT, DURASEAL, 5ML

## (undated) DEVICE — COVER, TABLE, UHC

## (undated) DEVICE — SYRINGE, 10 CC, LUER LOCK

## (undated) DEVICE — VALVE, HEMO, GUARDIAN II, W/GUIDEWIRE INSERTION TOOL & TORQUE

## (undated) DEVICE — ELECTRODE, ELECTROSURGICAL, BLADE EXT 4 INCH, INSULATED

## (undated) DEVICE — BONE, MILL, MIDAS REX, DUAL BLADE, ELECTRIC

## (undated) DEVICE — CUP, MEDICINE, GRADUATED, 2 OZ, PLASTIC, DISP, LF

## (undated) DEVICE — SPONGE, GAUZE, XRAY DECT, 16 PLY, 4 X 8, W/MASTER DMT,STERILE

## (undated) DEVICE — WOUND SYSTEM, DEBRIDEMENT & CLEANING, O.R DUOPAK

## (undated) DEVICE — CATHETER TRAY, SURESTEP, 16FR, URINE METER W/STATLOCK

## (undated) DEVICE — DRESSING, MEPILEX, POST OP, 8 X 4

## (undated) DEVICE — COVER, PLASTIC, MAYO STAND, 29.5IN X 55.5IN

## (undated) DEVICE — CATHETER, BALLOON, NC EUPHORA NONCOMPLIANT 3.5 X 12 X 142CM

## (undated) DEVICE — TIP, SUCTION, FRAZIER, 10 FR, DISP

## (undated) DEVICE — SEALER, BIPOLAR, AQUA MANTYS 6.0

## (undated) DEVICE — COVER, CART, 45 X 27 X 48 IN, CLEAR

## (undated) DEVICE — CATHETER, BALLOON, EMERGE, PTCA, 12 X 2.50MM

## (undated) DEVICE — TR BAND, RADIAL COMPRESSION, LONG, 29CM

## (undated) DEVICE — SPONGE, NEURO, 1/2 X 1/2IN, STERILE, LF

## (undated) DEVICE — NEEDLE, ELECTRODE, SUBDERMAL, PAIRED, 2.0 LEAD, DISP

## (undated) DEVICE — CORD, CAUTERY, BIOPOLAR FORCEP, 12FT

## (undated) DEVICE — DRAPE, INCISE, ANTIMICROBIAL, IOBAN 2, LARGE, 17 X 23 IN, DISPOSABLE, STERILE

## (undated) DEVICE — BONE WAX, 2.5 GRAMS

## (undated) DEVICE — GUIDEWIRE, INQWIRE, 3MM J, .035 X 210CM, FIXED

## (undated) DEVICE — KIT, PATIENT CARE, JACKSON TABLE W/PRONE-SAFE HEADREST

## (undated) DEVICE — GLIDESCOPE BLADE, SPECTRUM SU, LOPRO S4

## (undated) DEVICE — SUTURE, VICRYL, 4-0, 18 IN, UNDYED BR PS-2

## (undated) DEVICE — TIP, SUCTION, FRAZIER, 12 FR

## (undated) DEVICE — SPHERE, STEALTHSTATION, 5-PK

## (undated) DEVICE — SPONGE GAUZE, XRAY SC+RFID, 4X4 16 PLY, STERILE

## (undated) DEVICE — TIP,  ELECTRODE COATED INSULATED, EXTENDED, LF

## (undated) DEVICE — SUTURE, VICRYL, 0, 18 IN, CT-1, UNDYED

## (undated) DEVICE — SPONGE, HEMOSTATIC, GELATIN, SURGIFOAM, 8 X 12.5 CM X 10 MM

## (undated) DEVICE — GOWN, SURGICAL, SMARTGOWN, XLARGE, STERILE

## (undated) DEVICE — Device

## (undated) DEVICE — DEVICE KIT, INFLATION, CUSTOM, PARMA

## (undated) DEVICE — WOUND, EVAC, 400ML, POUCHED W/1/8`` TUBE W/TROCAR

## (undated) DEVICE — BAG, BANDED, 36IN X 28IN

## (undated) DEVICE — DRAPE, SHEET, FAN FOLDED, HALF, 44 X 58 IN, DISPOSABLE, LF, STERILE

## (undated) DEVICE — SUTURE, SILK, 2-0, 30 IN, SH, BLACK

## (undated) DEVICE — SPONGE, NEURO, 3/4 X 3/4IN, STERILE, LF

## (undated) DEVICE — EVACUATOR, WOUND, CLOSED, 3 SPRING, 400 CC, Y CONNECTING TUBE

## (undated) DEVICE — BUR, 3MM X 3.8MM, PRECISION, NEURO DRILL

## (undated) DEVICE — APPLIER,  LIGACLIP MULTI CLIP, 30 MED 11 1/2

## (undated) DEVICE — POWDER, SURGIFOAM, GELATIN, 1 GRAM

## (undated) DEVICE — ELECTRODE, CORKSCREW NEEDLE 1.5M LENGTH

## (undated) DEVICE — GUIDEWIRE, RUN THROUGH WIRE, 180CM

## (undated) DEVICE — DRAPE, INSTRUMENT, W/POUCH, STERI DRAPE, 7 X 11 IN, DISPOSABLE, STERILE

## (undated) DEVICE — DRESSING, TELFA, 3X4

## (undated) DEVICE — ELECTRODE, ELECTROSURGICAL, BLADE, INSULATED, ENT/IMA, STERILE

## (undated) DEVICE — BUR, 5.0MM, ROUND, AGGRESSIVE, FLUTED, LF

## (undated) DEVICE — TUBING, CLEAR N-COND, 5MM X 10, LF

## (undated) DEVICE — MANIFOLD, 4 PORT NEPTUNE STANDARD

## (undated) DEVICE — TAPE, SILK, DURAPORE, 3 IN X 10 YD, LF

## (undated) DEVICE — APPLICATOR, CHLORAPREP, W/ORANGE TINT, 26ML

## (undated) DEVICE — NEEDLE, SPINAL, 20 G X 3.5 IN, YELLOW HUB

## (undated) DEVICE — SLEEVE, VASO PRESS, CALF GARMENT, MEDIUM, GREEN

## (undated) DEVICE — COUNTER, NEEDLE, FOAM BLOCK, W/MAGNET, 20 COUNT

## (undated) DEVICE — PAD, GROUNDING, ELECTROSURGICAL, W/9 FT CABLE, POLYHESIVE II, ADULT, LF

## (undated) DEVICE — DRESSING, MEPILEX, BORDER FLEX, 6 X 8

## (undated) DEVICE — ELECTRODE, GROUND PLATE

## (undated) DEVICE — SEALANT, HEMOSTATIC, FLOSEAL, 10 ML

## (undated) DEVICE — SUTURE, VICRYL, 0, 18 IN, UNDYED

## (undated) DEVICE — TUBING, SMOKE EVAC, 3/8 X 10 FT

## (undated) DEVICE — TIPS, BONE CUTTING, IQ MICRO CLAW, 12CM

## (undated) DEVICE — ADHESIVE, SKIN, LIQUIBAND EXCEED

## (undated) DEVICE — CATHETER, EXPO, MODEL-D, 6FR FR4

## (undated) DEVICE — CATHETER KIT, RADIAL ARTLINE, 20G X 1 3/4

## (undated) DEVICE — CATHETER, GUIDING LAUNCHER 6FR EBU 3.75 LEFT

## (undated) DEVICE — NEEDLE, ELECTRODE, SUBDERMAL,SINGLE, 200CM LEAD, DISP

## (undated) DEVICE — PROTECTOR, HEEL/ANKLE/ELBOW, UNIVERSAL

## (undated) DEVICE — INTRODUCER, GLIDESHEATH SLENDER A-KIT, 6FR 10CM

## (undated) DEVICE — CASSETTE, SONOPET IQ IRRIGATION SUCTION

## (undated) DEVICE — MARKER, SKIN, REGULAR TIP, W/FLEXI-RULER

## (undated) DEVICE — CATHETER, DIAGNOSTIC, DXTERITY, 6FR 100CM, JL35

## (undated) DEVICE — CAUTERY, PENCIL, PUSH BUTTON, SMOKE EVAC, 70MM

## (undated) DEVICE — DRAIN, WOUND, ROUND, W/TROCAR, HOLE PATTERN, 10 IN, MEDIUM, 1/8 X 49 IN

## (undated) DEVICE — CLOSURE SYSTEM, DERMABOND, PRINEO, 22CM, STERILE

## (undated) DEVICE — SPONGE, DISSECTOR, PEANUT, 3/8, STERILE 5 FOAM HOLDER"

## (undated) DEVICE — DRESSING, MEPILEX, BORDER FLEX, 3 X 3

## (undated) DEVICE — ELECTRODE, MULTIFUNCTION, QUICK-COMBO, EDGE SYSTEM, 2 FT

## (undated) DEVICE — BASIN, EMESIS, STANDARD, STERILE

## (undated) DEVICE — DRAPE, SHEET, THREE QUARTER, FAN FOLD, 57 X 77 IN